# Patient Record
Sex: MALE | Race: BLACK OR AFRICAN AMERICAN | NOT HISPANIC OR LATINO | URBAN - METROPOLITAN AREA
[De-identification: names, ages, dates, MRNs, and addresses within clinical notes are randomized per-mention and may not be internally consistent; named-entity substitution may affect disease eponyms.]

---

## 2017-02-22 ENCOUNTER — EMERGENCY (EMERGENCY)
Facility: HOSPITAL | Age: 42
LOS: 1 days | Discharge: HOME | End: 2017-02-22
Attending: EMERGENCY MEDICINE | Admitting: EMERGENCY MEDICINE
Payer: COMMERCIAL

## 2017-02-22 VITALS
SYSTOLIC BLOOD PRESSURE: 171 MMHG | OXYGEN SATURATION: 99 % | HEART RATE: 73 BPM | RESPIRATION RATE: 18 BRPM | TEMPERATURE: 98 F | DIASTOLIC BLOOD PRESSURE: 97 MMHG

## 2017-02-22 LAB
ALBUMIN SERPL ELPH-MCNC: 4.2 G/DL — SIGNIFICANT CHANGE UP (ref 3.3–5)
ALP SERPL-CCNC: 138 U/L — HIGH (ref 40–120)
ALT FLD-CCNC: 44 U/L — HIGH (ref 4–41)
AST SERPL-CCNC: 48 U/L — HIGH (ref 4–40)
BASOPHILS # BLD AUTO: 0.01 K/UL — SIGNIFICANT CHANGE UP (ref 0–0.2)
BASOPHILS NFR BLD AUTO: 0.2 % — SIGNIFICANT CHANGE UP (ref 0–2)
BILIRUB SERPL-MCNC: 0.7 MG/DL — SIGNIFICANT CHANGE UP (ref 0.2–1.2)
BUN SERPL-MCNC: 10 MG/DL — SIGNIFICANT CHANGE UP (ref 7–23)
CALCIUM SERPL-MCNC: 9.2 MG/DL — SIGNIFICANT CHANGE UP (ref 8.4–10.5)
CHLORIDE SERPL-SCNC: 99 MMOL/L — SIGNIFICANT CHANGE UP (ref 98–107)
CO2 SERPL-SCNC: 27 MMOL/L — SIGNIFICANT CHANGE UP (ref 22–31)
CREAT SERPL-MCNC: 1.06 MG/DL — SIGNIFICANT CHANGE UP (ref 0.5–1.3)
EOSINOPHIL # BLD AUTO: 0.07 K/UL — SIGNIFICANT CHANGE UP (ref 0–0.5)
EOSINOPHIL NFR BLD AUTO: 1.2 % — SIGNIFICANT CHANGE UP (ref 0–6)
GLUCOSE SERPL-MCNC: 98 MG/DL — SIGNIFICANT CHANGE UP (ref 70–99)
HCT VFR BLD CALC: 43.8 % — SIGNIFICANT CHANGE UP (ref 39–50)
HGB BLD-MCNC: 13.5 G/DL — SIGNIFICANT CHANGE UP (ref 13–17)
IMM GRANULOCYTES NFR BLD AUTO: 0.2 % — SIGNIFICANT CHANGE UP (ref 0–1.5)
LIDOCAIN IGE QN: 42.7 U/L — SIGNIFICANT CHANGE UP (ref 7–60)
LYMPHOCYTES # BLD AUTO: 1.9 K/UL — SIGNIFICANT CHANGE UP (ref 1–3.3)
LYMPHOCYTES # BLD AUTO: 31.5 % — SIGNIFICANT CHANGE UP (ref 13–44)
MCHC RBC-ENTMCNC: 22.8 PG — LOW (ref 27–34)
MCHC RBC-ENTMCNC: 30.8 % — LOW (ref 32–36)
MCV RBC AUTO: 73.9 FL — LOW (ref 80–100)
MONOCYTES # BLD AUTO: 0.5 K/UL — SIGNIFICANT CHANGE UP (ref 0–0.9)
MONOCYTES NFR BLD AUTO: 8.3 % — SIGNIFICANT CHANGE UP (ref 2–14)
NEUTROPHILS # BLD AUTO: 3.55 K/UL — SIGNIFICANT CHANGE UP (ref 1.8–7.4)
NEUTROPHILS NFR BLD AUTO: 58.6 % — SIGNIFICANT CHANGE UP (ref 43–77)
PLATELET # BLD AUTO: 292 K/UL — SIGNIFICANT CHANGE UP (ref 150–400)
PMV BLD: 10.9 FL — SIGNIFICANT CHANGE UP (ref 7–13)
POTASSIUM SERPL-MCNC: 3.9 MMOL/L — SIGNIFICANT CHANGE UP (ref 3.5–5.3)
POTASSIUM SERPL-SCNC: 3.9 MMOL/L — SIGNIFICANT CHANGE UP (ref 3.5–5.3)
PROT SERPL-MCNC: 8 G/DL — SIGNIFICANT CHANGE UP (ref 6–8.3)
RBC # BLD: 5.93 M/UL — HIGH (ref 4.2–5.8)
RBC # FLD: 15.4 % — HIGH (ref 10.3–14.5)
SODIUM SERPL-SCNC: 140 MMOL/L — SIGNIFICANT CHANGE UP (ref 135–145)
TROPONIN T SERPL-MCNC: < 0.06 NG/ML — SIGNIFICANT CHANGE UP (ref 0–0.06)
WBC # BLD: 6.04 K/UL — SIGNIFICANT CHANGE UP (ref 3.8–10.5)
WBC # FLD AUTO: 6.04 K/UL — SIGNIFICANT CHANGE UP (ref 3.8–10.5)

## 2017-02-22 PROCEDURE — 93010 ELECTROCARDIOGRAM REPORT: CPT | Mod: 59

## 2017-02-22 PROCEDURE — 99284 EMERGENCY DEPT VISIT MOD MDM: CPT | Mod: 25

## 2017-02-22 PROCEDURE — 76775 US EXAM ABDO BACK WALL LIM: CPT | Mod: 26

## 2017-02-22 PROCEDURE — 76705 ECHO EXAM OF ABDOMEN: CPT | Mod: 26

## 2017-02-22 RX ORDER — METOCLOPRAMIDE HCL 10 MG
10 TABLET ORAL ONCE
Qty: 0 | Refills: 0 | Status: COMPLETED | OUTPATIENT
Start: 2017-02-22 | End: 2017-02-22

## 2017-02-22 RX ORDER — FAMOTIDINE 10 MG/ML
20 INJECTION INTRAVENOUS ONCE
Qty: 0 | Refills: 0 | Status: COMPLETED | OUTPATIENT
Start: 2017-02-22 | End: 2017-02-22

## 2017-02-22 RX ADMIN — Medication 30 MILLILITER(S): at 10:15

## 2017-02-22 RX ADMIN — FAMOTIDINE 20 MILLIGRAM(S): 10 INJECTION INTRAVENOUS at 10:15

## 2017-02-22 RX ADMIN — Medication 10 MILLIGRAM(S): at 10:15

## 2017-02-22 NOTE — ED PROVIDER NOTE - PHYSICAL EXAMINATION
Stacy att: General: Well appearing, nontoxic, no acute distress; Head: Normocephalic Atraumatic; Eyes: PERRL, EOMI; ENT: Airway patent; Neck: supple; Chest: Lungs clear to auscultation bilateral; Cardiac: Regular rate and rhythm, no murmurs, rubs or gallops; Abdomen: soft, epigastric tenderness, nondistended; no guarding or rebound, no pulsatile masses; Musculoskeletal: Calves symmetric, nontender, no palpable cord. Symmetric calves. No discoloration or tenderness. No pain with ROM of knee.; Skin: No rash, normal skin tone; Neck: no meningismus. Neuro: Alert and Oriented to person, place, and time; No focal deficit, CN 2-12 symmetric and intact. Motor strength 5/5 b/l. Stacy att: General: Well appearing, nontoxic, no acute distress; Head: Normocephalic Atraumatic; Eyes: PERRL, EOMI; ENT: Airway patent, TM clear bilateral, helix/tragus nontender, no mastoid tenderness; Neck: supple, no meningismus; Chest: Lungs clear to auscultation bilateral; Cardiac: Regular rate and rhythm, no murmurs, rubs or gallops; Abdomen: soft, epigastric tenderness, nondistended; no guarding or rebound, no pulsatile masses; Musculoskeletal: Calves symmetric, nontender, no palpable cord. Symmetric calves. No discoloration or tenderness. No pain with ROM of knee.; Skin: No rash, normal skin tone. Neuro: Alert and Oriented to person, place, and time; No focal deficit, CN 2-12 symmetric and intact. Motor strength 5/5 b/l.

## 2017-02-22 NOTE — ED PROVIDER NOTE - OBJECTIVE STATEMENT
42 y/o M pt with PMHx of HTN and seizure presents to the ED for gradual onset of worsening HA rated 6-7/10 in severity, associated dizziness, abd pain described as burning, and vomiting x4 days. Also endorses left knee pain x weeks. Took Motrin and Tylenol which provided no relief. Denies LOC. 42 y/o M pt with PMHx of HTN and seizure presents to the ED for gradual onset of worsening HA rated 6-7/10 in severity, associated dizziness, abd pain described as burning, and vomiting x4 days. Also endorses left knee pain x weeks, b/l ear pain and rhinorrhea. Took Motrin and Tylenol which provided no relief. Denies LOC. 40 y/o M pt with PMHx of HTN and seizure presents to the ED for gradual onset of worsening HA rated 6-7/10 in severity, gradual onset, no abrupt onset, onset at rest, nonexertional, non positional, associated lightheaded, no LOC for 4 days.  Reports preceding, abd pain described as burning, epigastric, nonradiating, as well as vomiting x5 days. Also endorses left knee pain x weeks, b/l ear pain and rhinorrhea. Took Motrin and Tylenol which seem to make epigastric pain worse.  States he has had worse headaches in the past, states the abdominal pain is higher in severity. Denies any CP, SOB, fever, focal neurologic weakness, neck pain or stiffness.  Triage notes right arm pain, patient denies this, states his left knee is the extremity bothering him.

## 2017-02-22 NOTE — ED PROVIDER NOTE - MEDICAL DECISION MAKING DETAILS
42 y/o M pt presenting with:  1. Headache. Patient is adamant that he has gradual onset of HA over days. No meningismus. Subarachnoid hemorrhage unlikely. No evidence of meningitis. Non focal neurological exam. No concern for intracranial mass.   2. Abdominal pain. Will obtain US and labs.   3. Left knee pain. No evidence of DVT, septic arthritis or soft tissue infection. 42 y/o M pt presenting with:  1. Headache. Patient is adamant that he has gradual onset of HA over days. No meningismus, nontoxic. Subarachnoid hemorrhage unlikely, intracranial bleed, meningitis, encephalitis, temporal arteritis, or intracranial mass.  2. Abdominal pain. Will obtain US and labs. Differential includes pancreatitis, gallstones, cholecystitis, AAA unlikely, atypical ACS unlikely give reproducibility, given EKG TWI that are not static, yet no other signs of ischemia, troponin sent, pain has been constant for days.  3. Left knee pain. No evidence of DVT, septic arthritis or soft tissue infection or e/o fracture. 42 y/o M pt presenting with:  1. Headache. Patient is adamant that he has gradual onset of HA over days. No meningismus, nontoxic. Subarachnoid hemorrhage unlikely, intracranial bleed, meningitis, encephalitis, temporal arteritis, or intracranial mass.  2. Abdominal pain. Will obtain US and labs. Differential includes pancreatitis, gallstones, cholecystitis, AAA unlikely, atypical ACS unlikely give reproducibility, given EKG TWI that are not static, yet no other signs of ischemia, troponin sent, negative.  Not c/w ACS.  No evidence of pancreatitis, AAA, cholecystitis, choledocholithiasis, cholangitis, mesenteric ischemia, small bowel obstruction, diverticulitis, colitis, appendicitis.    3. Left knee pain. No evidence of DVT, septic arthritis or soft tissue infection or e/o fracture.

## 2017-02-22 NOTE — ED PROVIDER NOTE - NS ED MD SCRIBE ATTENDING SCRIBE SECTIONS
PAST MEDICAL/SURGICAL/SOCIAL HISTORY/PHYSICAL EXAM/HIV/REVIEW OF SYSTEMS/VITAL SIGNS( Pullset)/HISTORY OF PRESENT ILLNESS/DISPOSITION PHYSICAL EXAM/DISPOSITION/HIV/VITAL SIGNS( Pullset)/REVIEW OF SYSTEMS/PAST MEDICAL/SURGICAL/SOCIAL HISTORY/HISTORY OF PRESENT ILLNESS/RESULTS

## 2017-02-22 NOTE — ED PROVIDER NOTE - CHPI ED SYMPTOMS POS
HEADACHE/VOMITING/DIZZINESS/abd pain, left knee pain DIZZINESS/abd pain, left knee pain, b/l ear pain, rhinorrhea/HEADACHE/VOMITING

## 2017-02-22 NOTE — ED PROVIDER NOTE - CARE PLAN
Principal Discharge DX:	Epigastric abdominal pain  Secondary Diagnosis:	Acute nonintractable headache, unspecified headache type  Secondary Diagnosis:	Acute pain of left knee

## 2017-02-22 NOTE — ED ADULT TRIAGE NOTE - CHIEF COMPLAINT QUOTE
pt coming with right side HA since with right arm pain/ with upper ABD pain rad. to back area  Sat. denies CP, /no dizziness  Meds. Phenytoin Sodium 100mg, Valsartan-HCTZ  320-25mg OD

## 2017-11-13 ENCOUNTER — EMERGENCY (EMERGENCY)
Facility: HOSPITAL | Age: 42
LOS: 1 days | Discharge: HOME | End: 2017-11-13
Attending: EMERGENCY MEDICINE | Admitting: EMERGENCY MEDICINE
Payer: COMMERCIAL

## 2017-11-13 VITALS
SYSTOLIC BLOOD PRESSURE: 159 MMHG | RESPIRATION RATE: 18 BRPM | DIASTOLIC BLOOD PRESSURE: 99 MMHG | HEART RATE: 66 BPM | OXYGEN SATURATION: 99 % | TEMPERATURE: 98 F

## 2017-11-13 PROCEDURE — 72074 X-RAY EXAM THORAC SPINE4/>VW: CPT | Mod: 26

## 2017-11-13 PROCEDURE — 99283 EMERGENCY DEPT VISIT LOW MDM: CPT

## 2017-11-13 RX ORDER — ACETAMINOPHEN 500 MG
650 TABLET ORAL ONCE
Qty: 0 | Refills: 0 | Status: COMPLETED | OUTPATIENT
Start: 2017-11-13 | End: 2017-11-13

## 2017-11-13 RX ORDER — IBUPROFEN 200 MG
1 TABLET ORAL
Qty: 20 | Refills: 0
Start: 2017-11-13 | End: 2017-11-18

## 2017-11-13 RX ADMIN — Medication 650 MILLIGRAM(S): at 15:51

## 2017-11-13 NOTE — ED PROVIDER NOTE - OBJECTIVE STATEMENT
Sent Naftin topical to his pharmacy. Thanks.    41 y/o M w/ PMHx of HTN and seizures, presents to the ED c/o intermittent b/l upper back pain x1 month. Pain is worse w/ lying down. Endorses use of Tylenol, Aleve, Advil, Vinayak and Motrin w/ no relief. Pt states he was not able to sleep last night due to pain, so decided to come to ER today. Denies numbness/tingling, weakness, urinary/fecal incontinence, fall, trauma or any other complaints. NKDA. 43 y/o M w/ PMHx of HTN and seizures, presents to the ED c/o intermittent b/l aching upper back pain x1 month. Pain is worse w/ lying down and on change of position. Endorses use of Tylenol, Aleve. Pt states he was not able to sleep last night due to pain, so decided to come to ER today. States he has a very physical job. Denies numbness/tingling, weakness, urinary/fecal incontinence, fall, trauma, fevers, cp, sob, abd pain, vomiting, diarrhea, dysuria, or any other complaints. NKDA.

## 2017-11-13 NOTE — ED PROVIDER NOTE - PLAN OF CARE
Follow with your PMD within 48-72 hours.  Rest, no heavy lifting.  Warm compresses to area. Recommend Ortho consult to discuss possible MRI vs Physical Therapy- referral list provided.  Light walking. Take Motrin 600 mg every 6-8 hours for pain with food, Any worsening pain, weakness, numbness, bowel or urinary incontinence or new concerning symptoms return to the Emergency Department.

## 2017-11-13 NOTE — ED PROVIDER NOTE - CARE PLAN
Principal Discharge DX:	Musculoskeletal pain  Instructions for follow-up, activity and diet:	Follow with your PMD within 48-72 hours.  Rest, no heavy lifting.  Warm compresses to area. Recommend Ortho consult to discuss possible MRI vs Physical Therapy- referral list provided.  Light walking. Take Motrin 600 mg every 6-8 hours for pain with food, Any worsening pain, weakness, numbness, bowel or urinary incontinence or new concerning symptoms return to the Emergency Department.

## 2017-11-13 NOTE — ED PROVIDER NOTE - CHPI ED SYMPTOMS NEG
no numbness/no bladder dysfunction/no tingling/no bowel dysfunction/no motor function loss no bowel dysfunction/no motor function loss/no tingling/no bladder dysfunction

## 2017-11-13 NOTE — ED PROVIDER NOTE - MUSCULOSKELETAL MINIMAL EXAM
no midline spinal tenderness, no deformity atraumatic/normal range of motion/neck supple/no midline spinal tenderness, no deformity

## 2017-11-13 NOTE — ED PROVIDER NOTE - MEDICAL DECISION MAKING DETAILS
41 y/o M w/ intermittent upper back pain, worse w/ movement. Normal exam. Non toxic appearing. Vitals stable. Pt concerned about upper spine malalignment. Will XR, pain control and PMD follow up.

## 2020-01-16 ENCOUNTER — EMERGENCY (EMERGENCY)
Facility: HOSPITAL | Age: 45
LOS: 1 days | Discharge: HOME | End: 2020-01-16
Attending: EMERGENCY MEDICINE | Admitting: EMERGENCY MEDICINE
Payer: COMMERCIAL

## 2020-01-16 VITALS
SYSTOLIC BLOOD PRESSURE: 162 MMHG | RESPIRATION RATE: 17 BRPM | HEART RATE: 86 BPM | DIASTOLIC BLOOD PRESSURE: 84 MMHG | OXYGEN SATURATION: 98 % | WEIGHT: 181 LBS | HEIGHT: 68 IN | TEMPERATURE: 98 F

## 2020-01-16 VITALS
HEART RATE: 82 BPM | TEMPERATURE: 98 F | SYSTOLIC BLOOD PRESSURE: 150 MMHG | RESPIRATION RATE: 15 BRPM | DIASTOLIC BLOOD PRESSURE: 80 MMHG | OXYGEN SATURATION: 98 %

## 2020-01-16 LAB
ALBUMIN SERPL ELPH-MCNC: 3.6 G/DL — SIGNIFICANT CHANGE UP (ref 3.3–5)
ALP SERPL-CCNC: 128 U/L — HIGH (ref 40–120)
ALT FLD-CCNC: 33 U/L — SIGNIFICANT CHANGE UP (ref 12–78)
ANION GAP SERPL CALC-SCNC: 8 MMOL/L — SIGNIFICANT CHANGE UP (ref 5–17)
AST SERPL-CCNC: 37 U/L — SIGNIFICANT CHANGE UP (ref 15–37)
BASOPHILS # BLD AUTO: 0.03 K/UL — SIGNIFICANT CHANGE UP (ref 0–0.2)
BASOPHILS NFR BLD AUTO: 0.5 % — SIGNIFICANT CHANGE UP (ref 0–2)
BILIRUB SERPL-MCNC: 0.2 MG/DL — SIGNIFICANT CHANGE UP (ref 0.2–1.2)
BUN SERPL-MCNC: 10 MG/DL — SIGNIFICANT CHANGE UP (ref 7–23)
CALCIUM SERPL-MCNC: 8.4 MG/DL — LOW (ref 8.5–10.1)
CHLORIDE SERPL-SCNC: 110 MMOL/L — HIGH (ref 96–108)
CO2 SERPL-SCNC: 23 MMOL/L — SIGNIFICANT CHANGE UP (ref 22–31)
CREAT SERPL-MCNC: 1.2 MG/DL — SIGNIFICANT CHANGE UP (ref 0.5–1.3)
EOSINOPHIL # BLD AUTO: 0.07 K/UL — SIGNIFICANT CHANGE UP (ref 0–0.5)
EOSINOPHIL NFR BLD AUTO: 1.2 % — SIGNIFICANT CHANGE UP (ref 0–6)
GLUCOSE SERPL-MCNC: 95 MG/DL — SIGNIFICANT CHANGE UP (ref 70–99)
HCT VFR BLD CALC: 41.6 % — SIGNIFICANT CHANGE UP (ref 39–50)
HGB BLD-MCNC: 12.6 G/DL — LOW (ref 13–17)
IMM GRANULOCYTES NFR BLD AUTO: 0.2 % — SIGNIFICANT CHANGE UP (ref 0–1.5)
LYMPHOCYTES # BLD AUTO: 0.85 K/UL — LOW (ref 1–3.3)
LYMPHOCYTES # BLD AUTO: 15.2 % — SIGNIFICANT CHANGE UP (ref 13–44)
MCHC RBC-ENTMCNC: 22.7 PG — LOW (ref 27–34)
MCHC RBC-ENTMCNC: 30.3 GM/DL — LOW (ref 32–36)
MCV RBC AUTO: 75 FL — LOW (ref 80–100)
MONOCYTES # BLD AUTO: 0.48 K/UL — SIGNIFICANT CHANGE UP (ref 0–0.9)
MONOCYTES NFR BLD AUTO: 8.6 % — SIGNIFICANT CHANGE UP (ref 2–14)
NEUTROPHILS # BLD AUTO: 4.17 K/UL — SIGNIFICANT CHANGE UP (ref 1.8–7.4)
NEUTROPHILS NFR BLD AUTO: 74.3 % — SIGNIFICANT CHANGE UP (ref 43–77)
NRBC # BLD: 0 /100 WBCS — SIGNIFICANT CHANGE UP (ref 0–0)
PHENYTOIN FREE SERPL-MCNC: 5.2 UG/ML — LOW (ref 10–20)
PLATELET # BLD AUTO: 255 K/UL — SIGNIFICANT CHANGE UP (ref 150–400)
POTASSIUM SERPL-MCNC: 4.4 MMOL/L — SIGNIFICANT CHANGE UP (ref 3.5–5.3)
POTASSIUM SERPL-SCNC: 4.4 MMOL/L — SIGNIFICANT CHANGE UP (ref 3.5–5.3)
PROT SERPL-MCNC: 7.5 G/DL — SIGNIFICANT CHANGE UP (ref 6–8.3)
RBC # BLD: 5.55 M/UL — SIGNIFICANT CHANGE UP (ref 4.2–5.8)
RBC # FLD: 15 % — HIGH (ref 10.3–14.5)
SODIUM SERPL-SCNC: 141 MMOL/L — SIGNIFICANT CHANGE UP (ref 135–145)
WBC # BLD: 5.61 K/UL — SIGNIFICANT CHANGE UP (ref 3.8–10.5)
WBC # FLD AUTO: 5.61 K/UL — SIGNIFICANT CHANGE UP (ref 3.8–10.5)

## 2020-01-16 PROCEDURE — 85027 COMPLETE CBC AUTOMATED: CPT

## 2020-01-16 PROCEDURE — 80053 COMPREHEN METABOLIC PANEL: CPT

## 2020-01-16 PROCEDURE — 96374 THER/PROPH/DIAG INJ IV PUSH: CPT

## 2020-01-16 PROCEDURE — 96361 HYDRATE IV INFUSION ADD-ON: CPT

## 2020-01-16 PROCEDURE — 36415 COLL VENOUS BLD VENIPUNCTURE: CPT

## 2020-01-16 PROCEDURE — 80185 ASSAY OF PHENYTOIN TOTAL: CPT

## 2020-01-16 PROCEDURE — 99284 EMERGENCY DEPT VISIT MOD MDM: CPT

## 2020-01-16 PROCEDURE — 99284 EMERGENCY DEPT VISIT MOD MDM: CPT | Mod: 25

## 2020-01-16 PROCEDURE — 93005 ELECTROCARDIOGRAM TRACING: CPT

## 2020-01-16 PROCEDURE — 93010 ELECTROCARDIOGRAM REPORT: CPT

## 2020-01-16 RX ORDER — ACETAMINOPHEN 500 MG
650 TABLET ORAL ONCE
Refills: 0 | Status: COMPLETED | OUTPATIENT
Start: 2020-01-16 | End: 2020-01-16

## 2020-01-16 RX ORDER — SODIUM CHLORIDE 9 MG/ML
1000 INJECTION INTRAMUSCULAR; INTRAVENOUS; SUBCUTANEOUS ONCE
Refills: 0 | Status: COMPLETED | OUTPATIENT
Start: 2020-01-16 | End: 2020-01-16

## 2020-01-16 RX ORDER — LEVETIRACETAM 250 MG/1
1000 TABLET, FILM COATED ORAL ONCE
Refills: 0 | Status: COMPLETED | OUTPATIENT
Start: 2020-01-16 | End: 2020-01-16

## 2020-01-16 RX ADMIN — Medication 650 MILLIGRAM(S): at 09:54

## 2020-01-16 RX ADMIN — SODIUM CHLORIDE 1000 MILLILITER(S): 9 INJECTION INTRAMUSCULAR; INTRAVENOUS; SUBCUTANEOUS at 09:54

## 2020-01-16 RX ADMIN — Medication 650 MILLIGRAM(S): at 11:20

## 2020-01-16 RX ADMIN — LEVETIRACETAM 440 MILLIGRAM(S): 250 TABLET, FILM COATED ORAL at 11:19

## 2020-01-16 RX ADMIN — SODIUM CHLORIDE 1000 MILLILITER(S): 9 INJECTION INTRAMUSCULAR; INTRAVENOUS; SUBCUTANEOUS at 11:20

## 2020-01-16 NOTE — ED PROVIDER NOTE - CARE PROVIDER_API CALL
Zeferino Lombardo)  Neurology  [address] [place] [address]  Phone: [phone]  Fax: [phone]  Follow Up Time:

## 2020-01-16 NOTE — ED PROVIDER NOTE - OBJECTIVE STATEMENT
pt with h/o seizures and HTN, pt usually knows when a seizure is coming, he felt he was going to have a seizure this morning so took an extra dose of dilantin.  when went to work he had a seizure witnessed, no significant trauma, pt currently in post-ictal phase, reports mild lip biting, no bowel or bladder incontinence.  c/o mild front headache.

## 2020-01-16 NOTE — ED ADULT NURSE NOTE - CHPI ED NUR SYMPTOMS NEG
no fever/no blurred vision/no weakness/no nausea/no numbness/no change in level of consciousness/no confusion/no vomiting/no dizziness

## 2020-01-16 NOTE — ED PROVIDER NOTE - NSFOLLOWUPINSTRUCTIONS_ED_ALL_ED_FT
-- Follow up with neurology (Dr. Garrett) in the  next 1 week.    -- Increase your Dilantin to 4 times a day, same day.    -- Return to the ER for worsening or persistent symptoms, and/or ANY NEW OR CONCERNING SYMPTOMS.    -- If you have difficulty following up, please call: [phone] to obtain a [facility] doctor or specialist who takes your insurance in your area.

## 2020-01-16 NOTE — ED PROVIDER NOTE - CLINICAL SUMMARY MEDICAL DECISION MAKING FREE TEXT BOX
pt with h/o seizures, had seizure today pt with h/o seizures, had seizure today, dilantin level low likely because pt is only taking dilantin bid, advised that he should be taking it 4 times a day

## 2020-01-16 NOTE — ED PROVIDER NOTE - PATIENT PORTAL LINK FT
You can access the FollowMyHealth Patient Portal offered by [facility] by registering at the following website: [email or web address]. By joining [employer]’s FollowMyHealth portal, you will also be able to view your health information using other applications (apps) compatible with our system.

## 2020-02-13 NOTE — ED PROVIDER NOTE - PROGRESS NOTE DETAILS
The patient is a 2y8m Male complaining of fever.
PA Tiberio- xray thoracic spine negative. Pt to follow up with Ortho outpt- referral list provided.

## 2020-02-27 ENCOUNTER — EMERGENCY (EMERGENCY)
Facility: HOSPITAL | Age: 45
LOS: 0 days | Discharge: HOME | End: 2020-02-27
Payer: COMMERCIAL

## 2020-02-27 VITALS
DIASTOLIC BLOOD PRESSURE: 97 MMHG | TEMPERATURE: 98 F | WEIGHT: 184.97 LBS | HEIGHT: 68 IN | SYSTOLIC BLOOD PRESSURE: 153 MMHG | HEART RATE: 63 BPM | OXYGEN SATURATION: 100 % | RESPIRATION RATE: 15 BRPM

## 2020-02-27 DIAGNOSIS — G89.29 OTHER CHRONIC PAIN: ICD-10-CM

## 2020-02-27 DIAGNOSIS — H92.03 OTALGIA, BILATERAL: ICD-10-CM

## 2020-02-27 DIAGNOSIS — I10 ESSENTIAL (PRIMARY) HYPERTENSION: ICD-10-CM

## 2020-02-27 DIAGNOSIS — G40.909 EPILEPSY, UNSPECIFIED, NOT INTRACTABLE, WITHOUT STATUS EPILEPTICUS: ICD-10-CM

## 2020-02-27 PROCEDURE — 99283 EMERGENCY DEPT VISIT LOW MDM: CPT

## 2020-02-27 RX ORDER — IBUPROFEN 200 MG
1 TABLET ORAL
Qty: 28 | Refills: 0
Start: 2020-02-27 | End: 2020-03-04

## 2020-02-27 NOTE — ED PROVIDER NOTE - PATIENT PORTAL LINK FT
You can access the FollowMyHealth Patient Portal offered by [facility] by registering at the following website: [email or web address]. By joining [employer]’s FollowMyHealth portal, you will also be able to view your health information using other applications (apps) compatible with our system.

## 2020-02-27 NOTE — ED ADULT TRIAGE NOTE - CHIEF COMPLAINT QUOTE
ears infections feels swollen inside. feels like something moving left ear. Pain to his legs and itching to his legs

## 2020-02-27 NOTE — ED PROVIDER NOTE - CLINICAL SUMMARY MEDICAL DECISION MAKING FREE TEXT BOX
otc pain and see ent  in 1 week  Discussed results and outcome of testing with the patient.  Patient advised to please follow up with their primary care doctor within the next 24-48 hours and return to the Emergency Department for worsening symptoms or any other concerns.  Patient advised that their doctor may call  to follow up on the specific results of the tests performed today in the emergency department.

## 2020-02-27 NOTE — ED ADULT NURSE NOTE - OBJECTIVE STATEMENT
Pt c/o bilateral ear pain for months worsened last night with swelling to right side of jaw. Pt c/o pain to neck denies any injury

## 2020-08-03 ENCOUNTER — EMERGENCY (EMERGENCY)
Facility: HOSPITAL | Age: 45
LOS: 1 days | Discharge: HOME | End: 2020-08-03
Attending: EMERGENCY MEDICINE | Admitting: EMERGENCY MEDICINE
Payer: COMMERCIAL

## 2020-08-03 VITALS
SYSTOLIC BLOOD PRESSURE: 155 MMHG | TEMPERATURE: 99 F | HEART RATE: 76 BPM | RESPIRATION RATE: 14 BRPM | DIASTOLIC BLOOD PRESSURE: 87 MMHG | OXYGEN SATURATION: 100 %

## 2020-08-03 LAB
ALBUMIN SERPL ELPH-MCNC: 4.1 G/DL — SIGNIFICANT CHANGE UP (ref 3.3–5)
ALP SERPL-CCNC: 141 U/L — HIGH (ref 40–120)
ALT FLD-CCNC: 24 U/L — SIGNIFICANT CHANGE UP (ref 4–41)
ANION GAP SERPL CALC-SCNC: 10 MMO/L — SIGNIFICANT CHANGE UP (ref 7–14)
AST SERPL-CCNC: 29 U/L — SIGNIFICANT CHANGE UP (ref 4–40)
BASOPHILS # BLD AUTO: 0.03 K/UL — SIGNIFICANT CHANGE UP (ref 0–0.2)
BASOPHILS NFR BLD AUTO: 0.5 % — SIGNIFICANT CHANGE UP (ref 0–2)
BILIRUB SERPL-MCNC: < 0.2 MG/DL — LOW (ref 0.2–1.2)
BUN SERPL-MCNC: 9 MG/DL — SIGNIFICANT CHANGE UP (ref 7–23)
CALCIUM SERPL-MCNC: 9.1 MG/DL — SIGNIFICANT CHANGE UP (ref 8.4–10.5)
CHLORIDE SERPL-SCNC: 102 MMOL/L — SIGNIFICANT CHANGE UP (ref 98–107)
CO2 SERPL-SCNC: 27 MMOL/L — SIGNIFICANT CHANGE UP (ref 22–31)
CREAT SERPL-MCNC: 1.08 MG/DL — SIGNIFICANT CHANGE UP (ref 0.5–1.3)
EOSINOPHIL # BLD AUTO: 0.24 K/UL — SIGNIFICANT CHANGE UP (ref 0–0.5)
EOSINOPHIL NFR BLD AUTO: 4.3 % — SIGNIFICANT CHANGE UP (ref 0–6)
GLUCOSE SERPL-MCNC: 86 MG/DL — SIGNIFICANT CHANGE UP (ref 70–99)
HCT VFR BLD CALC: 38.8 % — LOW (ref 39–50)
HGB BLD-MCNC: 11.8 G/DL — LOW (ref 13–17)
IMM GRANULOCYTES NFR BLD AUTO: 0.2 % — SIGNIFICANT CHANGE UP (ref 0–1.5)
LYMPHOCYTES # BLD AUTO: 1.91 K/UL — SIGNIFICANT CHANGE UP (ref 1–3.3)
LYMPHOCYTES # BLD AUTO: 34.1 % — SIGNIFICANT CHANGE UP (ref 13–44)
MCHC RBC-ENTMCNC: 22.6 PG — LOW (ref 27–34)
MCHC RBC-ENTMCNC: 30.4 % — LOW (ref 32–36)
MCV RBC AUTO: 74.5 FL — LOW (ref 80–100)
MONOCYTES # BLD AUTO: 0.54 K/UL — SIGNIFICANT CHANGE UP (ref 0–0.9)
MONOCYTES NFR BLD AUTO: 9.6 % — SIGNIFICANT CHANGE UP (ref 2–14)
NEUTROPHILS # BLD AUTO: 2.87 K/UL — SIGNIFICANT CHANGE UP (ref 1.8–7.4)
NEUTROPHILS NFR BLD AUTO: 51.3 % — SIGNIFICANT CHANGE UP (ref 43–77)
NRBC # FLD: 0 K/UL — SIGNIFICANT CHANGE UP (ref 0–0)
PHENYTOIN FREE SERPL-MCNC: 25.2 UG/ML — HIGH (ref 10–20)
PLATELET # BLD AUTO: 288 K/UL — SIGNIFICANT CHANGE UP (ref 150–400)
PMV BLD: 10.7 FL — SIGNIFICANT CHANGE UP (ref 7–13)
POTASSIUM SERPL-MCNC: 4 MMOL/L — SIGNIFICANT CHANGE UP (ref 3.5–5.3)
POTASSIUM SERPL-SCNC: 4 MMOL/L — SIGNIFICANT CHANGE UP (ref 3.5–5.3)
PROT SERPL-MCNC: 7.4 G/DL — SIGNIFICANT CHANGE UP (ref 6–8.3)
RBC # BLD: 5.21 M/UL — SIGNIFICANT CHANGE UP (ref 4.2–5.8)
RBC # FLD: 14.9 % — HIGH (ref 10.3–14.5)
SODIUM SERPL-SCNC: 139 MMOL/L — SIGNIFICANT CHANGE UP (ref 135–145)
TROPONIN T, HIGH SENSITIVITY: < 6 NG/L — SIGNIFICANT CHANGE UP (ref ?–14)
WBC # BLD: 5.6 K/UL — SIGNIFICANT CHANGE UP (ref 3.8–10.5)
WBC # FLD AUTO: 5.6 K/UL — SIGNIFICANT CHANGE UP (ref 3.8–10.5)

## 2020-08-03 PROCEDURE — 99284 EMERGENCY DEPT VISIT MOD MDM: CPT

## 2020-08-03 PROCEDURE — 71046 X-RAY EXAM CHEST 2 VIEWS: CPT | Mod: 26

## 2020-08-03 RX ORDER — ACETAMINOPHEN 500 MG
975 TABLET ORAL ONCE
Refills: 0 | Status: COMPLETED | OUTPATIENT
Start: 2020-08-03 | End: 2020-08-03

## 2020-08-03 RX ADMIN — Medication 975 MILLIGRAM(S): at 10:31

## 2020-08-03 NOTE — ED PROVIDER NOTE - CLINICAL SUMMARY MEDICAL DECISION MAKING FREE TEXT BOX
46 yo M pmh htn, seizure disorder on dilantin presents with tooth pain and episode of dizziness.  VSS AF.  Exam with tooth decay lower premolar, but no e/o dental abscess.  Also with soft tissue mass on upper left gumline, non-hemorrhagic.  Unclear etiology of lightheadedness, presentation best characterized as pre syncope.  Not c/w seizure.  Patient with low-intensity cp.  Low concern for acute coronary syndrome as chest pain non-exertional, non-radiating, and there is no nausea or diaphoresis.  Low concern for aortic dissection as chest pain not radiating to back, not described as "tearing", no pulse or neuro deficit on exam.  Low concern for pulmonary embolism, patient is PERC negative with low pre-test probability of PE.  Will send labs, cxr, ekg, supportive care.  Dispo pending.

## 2020-08-03 NOTE — ED ADULT NURSE NOTE - OBJECTIVE STATEMENT
Pt A/Ox4 states while on his way to work he started to feel feverish and have a h/a, denies CP or SOB, states "I just didn't feel like myself." Pt appears well, afebrile at this time. Pt states "I feel like im burning behind my eyed." +h/a at present. PIV inserted with aseptic technique, blood drawn, labeled at bedside with 2 pt identifiers and sent to lab. Pt aware of POC, NAD.

## 2020-08-03 NOTE — ED ADULT TRIAGE NOTE - CHIEF COMPLAINT QUOTE
Pt c/o tooth pain x 2 years with a fever of "90"   afebrile in triage well appearing   PMH: HTN, seizures

## 2020-08-03 NOTE — ED PROVIDER NOTE - PATIENT PORTAL LINK FT
You can access the FollowMyHealth Patient Portal offered by [facility] by registering at the following website: [email or web address]. By joining [email or web address] [email or web address] portal, you will also be able to view your health information using other applications (apps) compatible with our system.

## 2020-08-03 NOTE — ED PROVIDER NOTE - OBJECTIVE STATEMENT
46 yo M pmh htn, seizure disorder on dilantin presents with tooth pain and episode of dizziness.  Patient states that he has had pain in lower left tooth for 2 years, feels it most days, associated with bleeding on pillow in morning some days.  No fevers, chills, trismus.  He is tolerating solids and liquids.  Last saw dentist >2 years ago and was told at that time that he needed to go to oral surgeon but was unable to get medical clearance by his pcp.  Denies tobacco product history.      When questioned why patient presented today while experiencing tooth pain for two years, he states that he was driving to work around 7 am and experienced episode of lightheadedness.  There was no syncope.  Patient reports substernal cp, non-exertional, non-radiating, no-pleuritic, slight pressure, no aggravating or alleviating factors.  Denies sob, palpitation, leg swelling.  No dvt/pe history.  Last seizure 12/2019.

## 2020-08-03 NOTE — ED PROVIDER NOTE - NSFOLLOWUPINSTRUCTIONS_ED_ALL_ED_FT
1.  Please return to care for worsening pain, fever, chills, inability to eat or drink, voice changes, chest pain, nausea, vomiting, dizziness, passing out.   2.  Take tylenol 500 mg ever 6-8 hours as needed for pain.   3.  Follow with your pcp and dentist as early as able.

## 2020-08-03 NOTE — ED PROVIDER NOTE - PHYSICAL EXAMINATION
GEN:  Non-toxic appearing, non-distressed, speaking full sentences, non-diaphoretic, AAOx3  HEENT:  NCAT, neck supple, EOMI, PERRLA, sclera anicteric, no conjunctival pallor or injection, no stridor, normal voice, no tonsillar exudate, uvula midline, tympanic membranes and external auditory canals normal appearing bilaterally   CV:  regular rhythm and rate, s1/s2 audible, no murmurs, rubs or gallops, peripheral pulses 2+ and symmetric  PULM:  non-labored respirations, lungs clear to auscultation bilaterally, no wheezes, crackles or rales  ABD:  non distended, non-tender, no rebound, no guarding, negative Rob's sign, bowel sounds normal, no cvat  MSK:  no gross deformity, non-tender extremities and joints, range of motion grossly normal appearing, no extremity edema, extremities warm and well perfused   NEURO:  AAOx3, CN II-XII intact, motor 5/5 in upper and lower extremities bilaterally, sensation grossly intact in extremities and trunk, finger to nose testing wnl, no nystagmus, negative Romberg, no pronator drift, no gait deficit  SKIN:  warm, dry, no rash or vesicles GEN:  Non-toxic appearing, non-distressed, speaking full sentences, non-diaphoretic, AAOx3  HEENT:  Poor dentition.  #19 tooth with severe decay with erosion to gumline; nontender, no fluctuant gingiva.  0.3 cm soft, mobile mass protruding from gingiva between #14 and #15, no bleeding.  Non-tender maxillary or submandibular areas. Neck supple, EOMI, PERRLA, sclera anicteric, no conjunctival pallor or injection, no stridor, normal voice, no tonsillar exudate, uvula midline, tympanic membranes and external auditory canals normal appearing bilaterally   CV:  regular rhythm and rate, s1/s2 audible, no murmurs, rubs or gallops, peripheral pulses 2+ and symmetric  PULM:  non-labored respirations, lungs clear to auscultation bilaterally, no wheezes, crackles or rales  ABD:  non distended, non-tender, no rebound, no guarding, negative Rob's sign, bowel sounds normal, no cvat  MSK:  no gross deformity, non-tender extremities and joints, range of motion grossly normal appearing, no extremity edema, extremities warm and well perfused   NEURO:  AAOx3, CN II-XII intact, motor 5/5 in upper and lower extremities bilaterally, sensation grossly intact in extremities and trunk, finger to nose testing wnl, no nystagmus, negative Romberg, no pronator drift, no gait deficit  SKIN:  warm, dry, no rash or vesicles

## 2020-08-03 NOTE — ED PROVIDER NOTE - PROGRESS NOTE DETAILS
PARISI:  Labs reassuring, trop neg, ekg non-ischemic.  Patient instructed to see dentist for further care of decayed tooth.

## 2020-08-03 NOTE — ED ADULT NURSE NOTE - NSIMPLEMENTINTERV_GEN_ALL_ED
Implemented All Universal Safety Interventions:  [place] to call system. Call bell, personal items and telephone within reach. Instruct patient to call for assistance. Room bathroom lighting operational. Non-slip footwear when patient is off stretcher. Physically safe environment: no spills, clutter or unnecessary equipment. Stretcher in lowest position, wheels locked, appropriate side rails in place.

## 2021-03-29 ENCOUNTER — EMERGENCY (EMERGENCY)
Facility: HOSPITAL | Age: 46
LOS: 1 days | Discharge: HOME | End: 2021-03-29
Attending: EMERGENCY MEDICINE | Admitting: EMERGENCY MEDICINE
Payer: COMMERCIAL

## 2021-03-29 VITALS
RESPIRATION RATE: 16 BRPM | SYSTOLIC BLOOD PRESSURE: 185 MMHG | DIASTOLIC BLOOD PRESSURE: 85 MMHG | TEMPERATURE: 98 F | HEIGHT: 68 IN | HEART RATE: 66 BPM | OXYGEN SATURATION: 100 %

## 2021-03-29 VITALS
SYSTOLIC BLOOD PRESSURE: 159 MMHG | RESPIRATION RATE: 16 BRPM | HEART RATE: 61 BPM | TEMPERATURE: 97 F | OXYGEN SATURATION: 100 % | DIASTOLIC BLOOD PRESSURE: 98 MMHG

## 2021-03-29 LAB
ALBUMIN SERPL ELPH-MCNC: 4.6 G/DL — SIGNIFICANT CHANGE UP (ref 3.3–5)
ALP SERPL-CCNC: 145 U/L — HIGH (ref 40–120)
ALT FLD-CCNC: 26 U/L — SIGNIFICANT CHANGE UP (ref 4–41)
ANION GAP SERPL CALC-SCNC: 11 MMOL/L — SIGNIFICANT CHANGE UP (ref 7–14)
APPEARANCE UR: CLEAR — SIGNIFICANT CHANGE UP
AST SERPL-CCNC: 33 U/L — SIGNIFICANT CHANGE UP (ref 4–40)
BASOPHILS # BLD AUTO: 0.03 K/UL — SIGNIFICANT CHANGE UP (ref 0–0.2)
BASOPHILS NFR BLD AUTO: 0.5 % — SIGNIFICANT CHANGE UP (ref 0–2)
BILIRUB SERPL-MCNC: <0.2 MG/DL — SIGNIFICANT CHANGE UP (ref 0.2–1.2)
BILIRUB UR-MCNC: NEGATIVE — SIGNIFICANT CHANGE UP
BUN SERPL-MCNC: 10 MG/DL — SIGNIFICANT CHANGE UP (ref 7–23)
CALCIUM SERPL-MCNC: 9.2 MG/DL — SIGNIFICANT CHANGE UP (ref 8.4–10.5)
CHLORIDE SERPL-SCNC: 101 MMOL/L — SIGNIFICANT CHANGE UP (ref 98–107)
CO2 SERPL-SCNC: 26 MMOL/L — SIGNIFICANT CHANGE UP (ref 22–31)
COLOR SPEC: COLORLESS — SIGNIFICANT CHANGE UP
CREAT SERPL-MCNC: 1.12 MG/DL — SIGNIFICANT CHANGE UP (ref 0.5–1.3)
DIFF PNL FLD: NEGATIVE — SIGNIFICANT CHANGE UP
EOSINOPHIL # BLD AUTO: 0.3 K/UL — SIGNIFICANT CHANGE UP (ref 0–0.5)
EOSINOPHIL NFR BLD AUTO: 4.6 % — SIGNIFICANT CHANGE UP (ref 0–6)
GLUCOSE SERPL-MCNC: 80 MG/DL — SIGNIFICANT CHANGE UP (ref 70–99)
GLUCOSE UR QL: NEGATIVE — SIGNIFICANT CHANGE UP
HCT VFR BLD CALC: 44.1 % — SIGNIFICANT CHANGE UP (ref 39–50)
HGB BLD-MCNC: 13.4 G/DL — SIGNIFICANT CHANGE UP (ref 13–17)
IANC: 2.96 K/UL — SIGNIFICANT CHANGE UP (ref 1.5–8.5)
IMM GRANULOCYTES NFR BLD AUTO: 0.2 % — SIGNIFICANT CHANGE UP (ref 0–1.5)
KETONES UR-MCNC: NEGATIVE — SIGNIFICANT CHANGE UP
LEUKOCYTE ESTERASE UR-ACNC: NEGATIVE — SIGNIFICANT CHANGE UP
LYMPHOCYTES # BLD AUTO: 2.59 K/UL — SIGNIFICANT CHANGE UP (ref 1–3.3)
LYMPHOCYTES # BLD AUTO: 39.8 % — SIGNIFICANT CHANGE UP (ref 13–44)
MCHC RBC-ENTMCNC: 22.9 PG — LOW (ref 27–34)
MCHC RBC-ENTMCNC: 30.4 GM/DL — LOW (ref 32–36)
MCV RBC AUTO: 75.3 FL — LOW (ref 80–100)
MONOCYTES # BLD AUTO: 0.61 K/UL — SIGNIFICANT CHANGE UP (ref 0–0.9)
MONOCYTES NFR BLD AUTO: 9.4 % — SIGNIFICANT CHANGE UP (ref 2–14)
NEUTROPHILS # BLD AUTO: 2.96 K/UL — SIGNIFICANT CHANGE UP (ref 1.8–7.4)
NEUTROPHILS NFR BLD AUTO: 45.5 % — SIGNIFICANT CHANGE UP (ref 43–77)
NITRITE UR-MCNC: NEGATIVE — SIGNIFICANT CHANGE UP
NRBC # BLD: 0 /100 WBCS — SIGNIFICANT CHANGE UP
NRBC # FLD: 0 K/UL — SIGNIFICANT CHANGE UP
PH UR: 7.5 — SIGNIFICANT CHANGE UP (ref 5–8)
PHENYTOIN FREE SERPL-MCNC: 23.5 UG/ML — HIGH (ref 10–20)
PLATELET # BLD AUTO: 222 K/UL — SIGNIFICANT CHANGE UP (ref 150–400)
POTASSIUM SERPL-MCNC: 4.5 MMOL/L — SIGNIFICANT CHANGE UP (ref 3.5–5.3)
POTASSIUM SERPL-SCNC: 4.5 MMOL/L — SIGNIFICANT CHANGE UP (ref 3.5–5.3)
PROT SERPL-MCNC: 7.9 G/DL — SIGNIFICANT CHANGE UP (ref 6–8.3)
PROT UR-MCNC: NEGATIVE — SIGNIFICANT CHANGE UP
RBC # BLD: 5.86 M/UL — HIGH (ref 4.2–5.8)
RBC # FLD: 15.6 % — HIGH (ref 10.3–14.5)
SODIUM SERPL-SCNC: 138 MMOL/L — SIGNIFICANT CHANGE UP (ref 135–145)
SP GR SPEC: 1.01 — LOW (ref 1.01–1.02)
TOXICOLOGY SCREEN, DRUGS OF ABUSE, SERUM RESULT: SIGNIFICANT CHANGE UP
TSH SERPL-MCNC: 1.07 UIU/ML — SIGNIFICANT CHANGE UP (ref 0.27–4.2)
UROBILINOGEN FLD QL: SIGNIFICANT CHANGE UP
WBC # BLD: 6.5 K/UL — SIGNIFICANT CHANGE UP (ref 3.8–10.5)
WBC # FLD AUTO: 6.5 K/UL — SIGNIFICANT CHANGE UP (ref 3.8–10.5)

## 2021-03-29 PROCEDURE — 70496 CT ANGIOGRAPHY HEAD: CPT | Mod: 26

## 2021-03-29 PROCEDURE — 70498 CT ANGIOGRAPHY NECK: CPT | Mod: 26

## 2021-03-29 PROCEDURE — 99285 EMERGENCY DEPT VISIT HI MDM: CPT

## 2021-03-29 RX ORDER — ACETAMINOPHEN 500 MG
650 TABLET ORAL ONCE
Refills: 0 | Status: COMPLETED | OUTPATIENT
Start: 2021-03-29 | End: 2021-03-29

## 2021-03-29 RX ORDER — METOCLOPRAMIDE HCL 10 MG
10 TABLET ORAL ONCE
Refills: 0 | Status: COMPLETED | OUTPATIENT
Start: 2021-03-29 | End: 2021-03-29

## 2021-03-29 RX ORDER — MECLIZINE HCL 12.5 MG
25 TABLET ORAL ONCE
Refills: 0 | Status: COMPLETED | OUTPATIENT
Start: 2021-03-29 | End: 2021-03-29

## 2021-03-29 RX ADMIN — Medication 25 MILLIGRAM(S): at 19:58

## 2021-03-29 RX ADMIN — Medication 650 MILLIGRAM(S): at 19:58

## 2021-03-29 RX ADMIN — Medication 10 MILLIGRAM(S): at 19:58

## 2021-03-29 NOTE — ED PROVIDER NOTE - PROGRESS NOTE DETAILS
Feeling much better, ambulating well.  Advised that he should discuss dilantin with his doc as sx may be related to AE.  Also reviwed all labs incl elevated dilantin tonight.  Per neuro c/s no need to hold dose tonight as is minimally elevated.

## 2021-03-29 NOTE — CONSULT NOTE ADULT - ASSESSMENT
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   On exam, he had very tense neck paraspinal musculature with tenderness to palpation, but no focal neurologic deficits except a + romberg and initially a wide based gait which improved after osteopathic manipulative treatment with suboccipital release to the neck.  No papilledema.      IMPRESSION: Cervicogenic HA a/w cervicogenic vertigo due to neck tension.     Plan  [] Performed suboccipital release to the neck with improvement of symptoms.   [] Warm compress to the neck,   [] Check phenytoin level.  PT's prior levels were either supra or subtherapeutic.  Would have PT follow up with his neurologist and decide if any alternate drug given he frequently gets dizzy.   [] Outpatient PT referral for continued work on neck musculature.   [] If absolutely refractory, can discuss with his neurologist low dose tizanidine muscle relaxant PRN for neck tension.   [] No further neurologic work up inpatient.   [] F/U results of CTH and CTA ordered  [] Can follow up with his neurologist or at [address] [place] [address] clinic.     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   On exam, he had very tense neck paraspinal musculature with tenderness to palpation, but no focal neurologic deficits except a + romberg and initially a wide based gait which improved after osteopathic manipulative treatment with suboccipital release to the neck.  No papilledema.      IMPRESSION: Cervicogenic HA a/w cervicogenic vertigo due to neck tension.     Plan  [] Performed suboccipital release to the neck with improvement of symptoms.   [] Warm compress to the neck,   [] Check phenytoin level.  PT's prior levels were either supra or subtherapeutic.  Would have PT follow up with his neurologist and decide if any alternate drug given he frequently gets dizzy.   [] Outpatient PT referral for continued work on neck musculature.   [] If absolutely refractory, can discuss with his neurologist low dose tizanidine muscle relaxant PRN for neck tension.   [] No further neurologic work up inpatient.   [] F/U results of CTH and CTA ordered  [] Can follow up with his neurologist or at [address] [place] [address] clinic.     Sammy Chaney, DO  PGY-3 Neurology Service

## 2021-03-29 NOTE — ED PROVIDER NOTE - NSFOLLOWUPINSTRUCTIONS_ED_ALL_ED_FT
Your dizziness might be a side effect of your dilantin.  Please take with your neurologist about this as soon as possible since this may not be the best medication for you.  You may need to switch to another seizure medication.     Return to the ER for worsening symptoms, fevers, or other concerning signs.     Please see your primary doctor and/or your neurologist early this week.

## 2021-03-29 NOTE — ED PROVIDER NOTE - PATIENT PORTAL LINK FT
You can access the FollowMyHealth Patient Portal offered by [facility] by registering at the following website: [email or web address]. By joining [employer]’s FollowMyHealth portal, you will also be able to view your health information using other applications (apps) compatible with our system.

## 2021-03-29 NOTE — ED PROVIDER NOTE - CRANIAL NERVE AND PUPILLARY EXAM
apparent left lateral field cut, no other findings./cranial nerves 2-12 intact/central vision intact/CENTRAL VISION NOT INTACT

## 2021-03-29 NOTE — ED PROVIDER NOTE - NEUROLOGICAL, MLM
Alert and oriented, no focal deficits, no motor or sensory deficits. Ataxia gait. Abnormal Romberg test  and slightly slurred speech.

## 2021-03-29 NOTE — CONSULT NOTE ADULT - TIME BILLING
Above assessment and plan was discussed with on call neurology resident overnight on telephone and I agree with above assessment and plan and outpatient neurology follow up as patient's neurologic symptoms improved.

## 2021-03-29 NOTE — ED PROVIDER NOTE - CLINICAL SUMMARY MEDICAL DECISION MAKING FREE TEXT BOX
44 y/o male presents to ED with several neurological complaints corresponding findings in physical exam. Symptoms might be related to medicine toxicity vs CVA vs metabolic derangements. Plan to send labs, obtain CT, neuro consult and reassess. 44 y/o male presents to ED with several neurological complaints and corresponding findings in physical exam. Symptoms might be related to medicine toxicity vs CVA vs metabolic derangements. Plan to send labs, obtain CT, neuro consult and reassess.

## 2021-03-29 NOTE — ED PROVIDER NOTE - ATTESTATION, MLM
HPT pending progress./yes I have reviewed and confirmed nurses' notes for patient's medications, allergies, medical history, and surgical history.

## 2021-03-29 NOTE — ED ADULT TRIAGE NOTE - CHIEF COMPLAINT QUOTE
pt here for generalized weakness, headache, right arm itchiness and abnormal gait that started 3 days ago. pt also c/o itchiness to bilateral eyes x 3 months, worse today

## 2021-03-29 NOTE — ED PROVIDER NOTE - NSFOLLOWUPCLINICS_GEN_ALL_ED_FT
[facility]  Neurology Autoimmune Encephalitis  [address] [place] [id number]  Phone: [phone]  Fax: [phone]  Follow Up Time: 1-3 Days

## 2021-03-29 NOTE — ED ADULT NURSE NOTE - NS PRO AD NO ADVANCE DIRECTIVE
No Duration Of Freeze Thaw-Cycle (Seconds): 2 Number Of Freeze-Thaw Cycles: 1 freeze-thaw cycle Render Post-Care Instructions In Note?: no Detail Level: Detailed Post-Care Instructions: I reviewed with the patient in detail post-care instructions. Patient is to wear sunprotection, and avoid picking at any of the treated lesions. Pt may apply Vaseline to crusted or scabbing areas. Consent: The patient's consent was obtained including but not limited to risks of crusting, scabbing, blistering, scarring, darker or lighter pigmentary change, recurrence, incomplete removal and infection.

## 2021-03-29 NOTE — ED PROVIDER NOTE - OBJECTIVE STATEMENT
46 y/o male with hx of seizures presents to ED c/o difficulty walking, slurred speech, and severe headaches for the past 6 days. Pt states he has had similar episodes in the past but never this severe. Pt denies n/v/d, trauma, fever, chills, sick contact, recent travel, or any other medical problems.

## 2021-03-29 NOTE — CONSULT NOTE ADULT - SUBJECTIVE AND OBJECTIVE BOX
NEUROLOGY CONSULT   HPI: LESA PABLO is a 45y ***-handed man with a PMH of *** who presented on 03-29-21 with .      Patient is a 45y old  Male who presents with a chief complaint of   HPI:      ROS: A 10-system ROS was performed and is negative except for those items noted above.     PMH:  Seizure Disorder    Hypertension        Home Medications:  atenolol:  (27 Feb 2020 17:40)  Dilantin: 400  orally 2 times a day (27 Feb 2020 17:40)    MEDICATIONS  (STANDING):    MEDICATIONS  (PRN):      No Known Allergies      No significant past surgical history        Social History:      No pertinent family history in first degree relatives        OBJECTIVE  Vital Signs Last 24 Hrs  T(C): 36.3 (29 Mar 2021 19:09), Max: 36.6 (29 Mar 2021 18:11)  T(F): 97.4 (29 Mar 2021 19:09), Max: 97.9 (29 Mar 2021 18:11)  HR: 61 (29 Mar 2021 19:09) (61 - 66)  BP: 159/98 (29 Mar 2021 19:09) (159/98 - 185/85)  BP(mean): --  RR: 16 (29 Mar 2021 19:09) (16 - 16)  SpO2: 100% (29 Mar 2021 19:09) (100% - 100%)  I&O's Summary    Height (cm): 172.7 (03-29)    PHYSICAL EXAM:  General: Appears stated age, in NAD  Neurologic:  Mental Status: Awake, alert, oriented to person, place, situation, time. Normal affect. Follows all commands.    Language: Speech is clear, fluent with preserved naming, repetition and comprehension.      Cranial Nerves: PERRLA (R = 3mm, L = 3mm). Visual fields intact. EOMI no nystagmus. V1-3 intact to light touch.  No facial asymmetry b/l, full eye closure strength b/l. Hearing grossly normal to conversation.  Symmetric palate elevation in midline.  Head turning & shoulder shrug intact b/l. Tongue midline, normal movements, no atrophy.  Motor: Normal muscle bulk & tone. No noticeable tremor, myoclonus or pronator drift. ***/5 strength.	  Sensation: Symmetric B/L preserved sensation to light touch, pin prick, position.    Cortical: No extinction on double simultaneous touch and no signs of neglect.   Reflexes: ***/4.  Plantar Responses are ***.   Coordination: Intact rapid-alternating movements. No dysmetria on finger-to-nose or heel-to-shin.  No dysdiadodyskinesia  Gait: Normal stance, stride length, touch off, arm swing and adelina.   Normal Romberg. No postural instability.                         13.4   6.50  )-----------( 222      ( 29 Mar 2021 20:32 )             44.1     03-29    138  |  101  |  10  ----------------------------<  80  4.5   |  26  |  1.12    Ca    9.2      29 Mar 2021 20:32    TPro  7.9  /  Alb  4.6  /  TBili  <0.2  /  DBili  x   /  AST  33  /  ALT  26  /  AlkPhos  145<H>  03-29          Imaging/Studies:           NEUROLOGY CONSULT   HPI: 45y R-handed man with a PMH of generalized epilepsy on  BID who presented on 03-29-21 with 6 days of worsening pressure like HA in his neck, head and periorbital region a/w internal sense of vertigo.  PT notes he had a gradual onset 7/10 HA which he has had in the past and about 2 months of periorbital pain.  Endorses some photophobia but denies any phonophobia, nausea or vomiting.  Endorses significant stressors at home and at work as well as associated neck pain.  Describes vertigo as internal sense of motion which is hard for him to fully describe.  HA worsens when he stands. No associated other neurologic signs/symptoms.  He takes his phenytoin regularly and denies any recent changes in dosing or frequency.  Of note, he has had several ED visits in the past in which he has noted dizziness and HA as complaint.  Has not had a seizure since 12/2019.      ROS: A 10-system ROS was performed and is negative except for those items noted above.     PMH:  Seizure Disorder  Hypertension    Home Medications:  atenolol:  (27 Feb 2020 17:40)  Dilantin: 400  orally 2 times a day (27 Feb 2020 17:40)    ALL: No Known Allergies    PSH: No significant past surgical history      Social History: Denies tobacco, alcohol or drug use. Works as .     FH: No pertinent family history in first degree relatives    OBJECTIVE  Vital Signs Last 24 Hrs  T(C): 36.3 (29 Mar 2021 19:09), Max: 36.6 (29 Mar 2021 18:11)  T(F): 97.4 (29 Mar 2021 19:09), Max: 97.9 (29 Mar 2021 18:11)  HR: 61 (29 Mar 2021 19:09) (61 - 66)  BP: 159/98 (29 Mar 2021 19:09) (159/98 - 185/85)  BP(mean): --  RR: 16 (29 Mar 2021 19:09) (16 - 16)  SpO2: 100% (29 Mar 2021 19:09) (100% - 100%)  I&O's Summary  Height (cm): 172.7 (03-29)  PHYSICAL EXAM:  General: Appears stated age, in NAD  HEENT: Normocephalic, atraumatic, significant paraspinal musculature tenderness.  Tenderness to palpation of cervical paraspinal muscles    Neurologic:  Mental Status: Awake, alert, oriented to person, place, situation, time. Normal affect. Follows all commands.  Language: Speech is clear, fluent with preserved naming, repetition and comprehension.  One sentence had a stuttering type adelina, which PT endorsed happens when he is stressed.    Cranial Nerves: PERRLA (R = 2mm, L = 2mm). Visual fields intact. EOMI no nystagmus. V1-3 intact to light touch.  No facial asymmetry b/l, full eye closure strength b/l. Hearing grossly normal to conversation.  Symmetric palate elevation in midline.  Head turning & shoulder shrug intact b/l. Tongue midline, normal movements, no atrophy.  No papilledema.    Motor: Normal muscle bulk & tone. No noticeable tremor, myoclonus or pronator drift. 5/5 strength throughout.	  Sensation: Symmetric B/L preserved sensation to light touch, pin prick, position.    Cortical: No extinction on double simultaneous touch and no signs of neglect.   Reflexes: 2/4 throughout.  Plantar Responses are downgoing B/L   Coordination: Intact rapid-alternating movements. No dysmetria on finger-to-nose or heel-to-shin.  No dysdiadochokinesia  Gait: Initially widened stance and a stiff wide based gait with reduced arm swing.  Trouble with tandem forward, but did not fall over and was able to walk backwards unassisted normally.  Re-tested gait after performing OMT muscle energy technique to suboccipital muscles and PT improved with normal stance, still a slight reduced stride length but no longer wide based. + Romberg               13.4   6.50  )-----------( 222      ( 29 Mar 2021 20:32 )             44.1     03-29    138  |  101  |  10  ----------------------------<  80  4.5   |  26  |  1.12    Ca    9.2      29 Mar 2021 20:32    TPro  7.9  /  Alb  4.6  /  TBili  <0.2  /  DBili  x   /  AST  33  /  ALT  26  /  AlkPhos  145<H>  03-29    CT Head w/o Cont (10.01.13 @ 16:00)   IMPRESSION:   1) UNREMARKABLE CT STUDY OF THE BRAIN.  2) NO MASS OR SPACE OCCUPYING LESION IDENTIFIED.  NO ACUTE CEREBRAL  PATHOLOGY SUGGESTED.  NO COLLECTIONS OR HEMORRHAGIC FOCI IDENTIFIED.  3) CLEAR SINUSES AND MASTOIDS.

## 2021-04-01 LAB — PHENYTOIN FREE SERPL-MCNC: 1.5 UG/ML — SIGNIFICANT CHANGE UP (ref 1–2)

## 2021-05-10 ENCOUNTER — EMERGENCY (EMERGENCY)
Facility: HOSPITAL | Age: 46
LOS: 1 days | Discharge: HOME | End: 2021-05-10
Attending: STUDENT IN AN ORGANIZED HEALTH CARE EDUCATION/TRAINING PROGRAM | Admitting: STUDENT IN AN ORGANIZED HEALTH CARE EDUCATION/TRAINING PROGRAM
Payer: COMMERCIAL

## 2021-05-10 VITALS
SYSTOLIC BLOOD PRESSURE: 180 MMHG | HEART RATE: 63 BPM | DIASTOLIC BLOOD PRESSURE: 95 MMHG | OXYGEN SATURATION: 99 % | RESPIRATION RATE: 18 BRPM | HEIGHT: 68 IN | TEMPERATURE: 98 F

## 2021-05-10 PROCEDURE — 99284 EMERGENCY DEPT VISIT MOD MDM: CPT

## 2021-05-10 RX ORDER — FLUTICASONE PROPIONATE 50 MCG
1 SPRAY, SUSPENSION NASAL
Refills: 0 | Status: DISCONTINUED | OUTPATIENT
Start: 2021-05-10 | End: 2021-05-13

## 2021-05-10 RX ORDER — FLUTICASONE PROPIONATE 50 MCG
1 SPRAY, SUSPENSION NASAL
Qty: 10 | Refills: 0
Start: 2021-05-10 | End: 2021-05-14

## 2021-05-10 RX ORDER — DIPHENHYDRAMINE HCL 50 MG
25 CAPSULE ORAL ONCE
Refills: 0 | Status: COMPLETED | OUTPATIENT
Start: 2021-05-10 | End: 2021-05-10

## 2021-05-10 RX ORDER — LORATADINE 10 MG/1
1 TABLET ORAL
Qty: 5 | Refills: 0
Start: 2021-05-10 | End: 2021-05-14

## 2021-05-10 RX ORDER — DIPHENHYDRAMINE HCL 50 MG
1 CAPSULE ORAL
Qty: 5 | Refills: 0
Start: 2021-05-10 | End: 2021-05-14

## 2021-05-10 RX ORDER — LORATADINE 10 MG/1
10 TABLET ORAL ONCE
Refills: 0 | Status: COMPLETED | OUTPATIENT
Start: 2021-05-10 | End: 2021-05-10

## 2021-05-10 RX ADMIN — Medication 1 SPRAY(S): at 13:34

## 2021-05-10 RX ADMIN — LORATADINE 10 MILLIGRAM(S): 10 TABLET ORAL at 13:04

## 2021-05-10 RX ADMIN — Medication 25 MILLIGRAM(S): at 13:04

## 2021-05-10 NOTE — ED PROVIDER NOTE - PATIENT PORTAL LINK FT
You can access the FollowMyHealth Patient Portal offered by [facility] by registering at the following website: [email or web address]. By joining [employer]’s FollowMyHealth portal, you will also be able to view your health information using other applications (apps) compatible with our system.

## 2021-05-10 NOTE — ED PROVIDER NOTE - OBJECTIVE STATEMENT
45 y/o male with pmhx of seizure disorder on phenytoin, HTN presenting with right eye pain and congestion on right side. Over the past week patient has had runny nose and itchy eyes with recent development of right eye pain on Saturday. Patient reports that he has had these symptoms in his left eye in the past. Reports b/l blurring of vision, wears glasses, and has suffered from sinus headaches in the past. Patient has tried ibuprofen with no relief and also reports neck pain. Patient denies chest pain, SOB, abdominal pain, recent falls, fevers/chills, n/v/d, cough, rashes or changes in urination.

## 2021-05-10 NOTE — ED ADULT NURSE NOTE - NSIMPLEMENTINTERV_GEN_ALL_ED
Implemented All Universal Safety Interventions:  [employer] to call system. Call bell, personal items and telephone within reach. Instruct patient to call for assistance. Room bathroom lighting operational. Non-slip footwear when patient is off stretcher. Physically safe environment: no spills, clutter or unnecessary equipment. Stretcher in lowest position, wheels locked, appropriate side rails in place.

## 2021-05-10 NOTE — ED PROVIDER NOTE - ATTENDING CONTRIBUTION TO CARE
46 y.o M with PMH Seizure disorder on phenytoin , HTN p/w congestion and eye pressure on the right side. pt reports increased congestion and runny nose along with eye itching worse over last  few weeks. he states he has had pressure in his sinus along with eye itching. he denies fever, chills, chest pain. Visual acuity 20/45 w/o glasses . pressures 15 right 18 left. he denies fever, chills, chest pain, recent seziure, head trauma. he took flonase a month ago w/ improvement, he has not taken any medicatiosn recently   denies fever, chills, chest pain, SOB, abdominal pain, diarrhea, dysuria, syncope, bleeding, new rash,weakness, numbness, blurred vision  + nasal congestion   ROS  otherwise negative as per HPI  Gen: Awake, Alert, WD, WN, NAD  Head:  NC/AT  Eyes:  PERRL, EOMI, Conjunctiva pink, lids normal, no scleral icterus. floreceine mild uptake at right medial border possibly 2/2 pterygium. Pressureds left 18 right 15. Visual acuity 20/45 and 20/40 without glasses pt usually weakns glasses   ENT: OP clear, no exudates, no erythema, uvula midline, mild nasal redness , moist mucus membranes  Neck: supple, nontender, no meningismus, no JVD, trachea midline  Cardiac/CV:  S1 S2, RRR, no M/G/R  Respiratory/Pulm:  CTAB, good air movement, normal resp effort, no wheezes/stridor/retractions/rales/rhonchi  Gastrointestinal/Abdomen:  Soft, nontender, nondistended, +BS, no rebound/guarding    Ext:  warm, well perfused, moving all extremities spontaneously, no peripheral edema, distal pulses intact  Skin: intact, no rash  Neuro:  AAOx3, sensation intact, motor 5/5 x 4 extremities, normal gait, speech clear  MDM as above

## 2021-05-10 NOTE — ED ADULT NURSE NOTE - OBJECTIVE STATEMENT
pt received to intake 13, a&ox 4, ambulatory, pmh of HTN, sz disorder, p/w runny nose, sinus pressure, eye pressure, eye itching x1week. Pt breathing even and unlabored on room air. Denies fever, chills, SOB, chest pain, palpitations, dizziness, N/V/D, constipation, numbness, tingling. Medications administered as ordered. to be D/C.

## 2021-05-10 NOTE — ED PROVIDER NOTE - NSFOLLOWUPINSTRUCTIONS_ED_ALL_ED_FT
Please follow up with the Ophthalmologist as we discussed with the information attached. Your symptoms are consistent with your history of allergies and prescriptions were sent to your local pharmacy to take as needed for your symptoms.       WHAT YOU NEED TO KNOW:    What causes eye pain? Eye pain may be caused by a problem within your eye. A problem or condition in another body area can also cause pain that travels to your eye. Eye pain may be caused by any of the following:  •Dry eyes      •An abrasion on your cornea (the surface of your eye)      •A foreign body in your eye      •Inflammation of a nerve, gland, or muscle in your eye      •Certain types of glaucoma (increased pressure inside your eye that can cause vision loss)      •A sinus infection or jaw pain      •Headaches, including migraine or cluster headaches      How is the cause of eye pain diagnosed? Your healthcare provider will examine your eyes and ask when your pain began. He will also ask if you have other symptoms, such as sensitivity to light. Tell him if you ever had eye surgery or an eye injury. Tell him if you wear glasses or contact lenses. Also tell him the names of medicines you take, and if you have allergies or health conditions. You may need the following tests:  •A visual acuity test checks your vision in both eyes. You will be asked to read letters and numbers from a chart.      •A slit-lamp exam uses a microscope to check every part of your eye for inflammation or injury. A dye may be used to look for damage to your cornea.             •A fluorescein stain test uses dye to show if you have a foreign body in your eye. It can also reveal damage to your cornea.      •A tonometry test measures the pressure inside your eye to check for glaucoma. Your healthcare provider will numb your eyes with eyedrops before he checks your eye pressure.      How is eye pain treated?   •Artificial tears are eyedrops that can help moisturize your eyes and relieve your pain. Ask your healthcare provider how often to use artificial tears.      •NSAIDs, such as ibuprofen, help decrease swelling, pain, and fever. This medicine is available with or without a doctor's order. NSAIDs can cause stomach bleeding or kidney problems in certain people. If you take blood thinner medicine, always ask if NSAIDs are safe for you. Always read the medicine label and follow directions. Do not give these medicines to children under 6 months of age without direction from your child's healthcare provider.      When should I contact my healthcare provider?   •You have a fever.      •Your eye pain gets worse when you move your eyes.      •You have questions or concerns about your condition or care.      When should I seek immediate care or call 911?   •You have any vision loss.      •You have sudden vision changes such as blurred vision, double vision, or seeing halos around lights.      •You develop severe eye pain.

## 2021-05-10 NOTE — ED PROVIDER NOTE - NS ED ROS FT
Gen: Denies fever, weight loss  CV: Denies chest pain, palpitations  Skin: Denies rash, erythema, color changes  Resp: Denies SOB, cough  Endo: Denies sensitivity to heat, cold, increased urination  GI: Denies diarrhea, constipation, nausea, vomiting  Msk: Denies back pain, LE swelling, extremity pain  : Denies dysuria, increased frequency  Neuro: Denies LOC, weakness, recent seizures, numbness/tingling

## 2021-05-10 NOTE — ED PROVIDER NOTE - CLINICAL SUMMARY MEDICAL DECISION MAKING FREE TEXT BOX
46 y.o M with PMH Seizure disorder on phenytoin , HTN p/w congestion and eye pressure on the right side. pt reports increased congestion and runny nose along with eye itching worse over last  few weeks. he states he has had pressure in his sinus along with eye itching. he denies fever, chills, chest pain. Visual acuity 20/45 w/o glasses . pressures 15 right 18 left.  . pt w/ sinus congestion and eye irritation will given loratidine, benadryl, flonase, optho follow ip

## 2021-05-10 NOTE — ED ADULT TRIAGE NOTE - CHIEF COMPLAINT QUOTE
Pt complaining of R eye pressure and dizziness since Saturday. Pt denies chest pain, sob, n/v/d, fever or chills.

## 2021-05-10 NOTE — ED PROVIDER NOTE - PHYSICAL EXAMINATION
Gen: WDWN, NAD  HEENT: PERRLA, EOMI, Visual acuity; OD 20/40 OS 20/40, b/l 20/25 (wears glasses but does not have them), IOP right 15 left 18, no abrasions with fluorescin stain,  no nasal discharge, mucous membranes moist  CV: RRR, +S1/S2, no M/R/G  Resp: CTAB, no W/R/R  GI: Abdomen soft non-distended, NTTP, no masses  MSK: No open wounds, no bruising, no LE edema  Neuro: CN2-12 grossly intact, A&Ox4, MS +5/5 in UE and LE BL, finger to nose smooth and rapid, gross sensation intact in UE and LE BL, gait smooth and coordinated, negative rhomberg, negative pronator drift   Psych: appropriate mood

## 2021-06-01 ENCOUNTER — EMERGENCY (EMERGENCY)
Facility: HOSPITAL | Age: 46
LOS: 1 days | Discharge: HOME | End: 2021-06-01
Attending: EMERGENCY MEDICINE | Admitting: EMERGENCY MEDICINE
Payer: COMMERCIAL

## 2021-06-01 VITALS
DIASTOLIC BLOOD PRESSURE: 107 MMHG | RESPIRATION RATE: 18 BRPM | HEIGHT: 68 IN | OXYGEN SATURATION: 100 % | SYSTOLIC BLOOD PRESSURE: 186 MMHG | TEMPERATURE: 98 F | HEART RATE: 80 BPM

## 2021-06-01 VITALS
RESPIRATION RATE: 18 BRPM | DIASTOLIC BLOOD PRESSURE: 85 MMHG | HEART RATE: 60 BPM | OXYGEN SATURATION: 100 % | SYSTOLIC BLOOD PRESSURE: 153 MMHG

## 2021-06-01 LAB
ALBUMIN SERPL ELPH-MCNC: 4 G/DL — SIGNIFICANT CHANGE UP (ref 3.3–5)
ALP SERPL-CCNC: 129 U/L — HIGH (ref 40–120)
ALT FLD-CCNC: 24 U/L — SIGNIFICANT CHANGE UP (ref 4–41)
ANION GAP SERPL CALC-SCNC: 12 MMOL/L — SIGNIFICANT CHANGE UP (ref 7–14)
AST SERPL-CCNC: 26 U/L — SIGNIFICANT CHANGE UP (ref 4–40)
BASOPHILS # BLD AUTO: 0.02 K/UL — SIGNIFICANT CHANGE UP (ref 0–0.2)
BASOPHILS NFR BLD AUTO: 0.4 % — SIGNIFICANT CHANGE UP (ref 0–2)
BILIRUB SERPL-MCNC: <0.2 MG/DL — SIGNIFICANT CHANGE UP (ref 0.2–1.2)
BUN SERPL-MCNC: 9 MG/DL — SIGNIFICANT CHANGE UP (ref 7–23)
CALCIUM SERPL-MCNC: 9 MG/DL — SIGNIFICANT CHANGE UP (ref 8.4–10.5)
CHLORIDE SERPL-SCNC: 103 MMOL/L — SIGNIFICANT CHANGE UP (ref 98–107)
CO2 SERPL-SCNC: 25 MMOL/L — SIGNIFICANT CHANGE UP (ref 22–31)
CREAT SERPL-MCNC: 1.14 MG/DL — SIGNIFICANT CHANGE UP (ref 0.5–1.3)
EOSINOPHIL # BLD AUTO: 0.15 K/UL — SIGNIFICANT CHANGE UP (ref 0–0.5)
EOSINOPHIL NFR BLD AUTO: 2.9 % — SIGNIFICANT CHANGE UP (ref 0–6)
GLUCOSE SERPL-MCNC: 107 MG/DL — HIGH (ref 70–99)
HCT VFR BLD CALC: 42.6 % — SIGNIFICANT CHANGE UP (ref 39–50)
HGB BLD-MCNC: 12.5 G/DL — LOW (ref 13–17)
IANC: 3.13 K/UL — SIGNIFICANT CHANGE UP (ref 1.5–8.5)
IMM GRANULOCYTES NFR BLD AUTO: 0.4 % — SIGNIFICANT CHANGE UP (ref 0–1.5)
LYMPHOCYTES # BLD AUTO: 1.5 K/UL — SIGNIFICANT CHANGE UP (ref 1–3.3)
LYMPHOCYTES # BLD AUTO: 28.6 % — SIGNIFICANT CHANGE UP (ref 13–44)
MCHC RBC-ENTMCNC: 22.3 PG — LOW (ref 27–34)
MCHC RBC-ENTMCNC: 29.3 GM/DL — LOW (ref 32–36)
MCV RBC AUTO: 75.9 FL — LOW (ref 80–100)
MONOCYTES # BLD AUTO: 0.43 K/UL — SIGNIFICANT CHANGE UP (ref 0–0.9)
MONOCYTES NFR BLD AUTO: 8.2 % — SIGNIFICANT CHANGE UP (ref 2–14)
NEUTROPHILS # BLD AUTO: 3.13 K/UL — SIGNIFICANT CHANGE UP (ref 1.8–7.4)
NEUTROPHILS NFR BLD AUTO: 59.5 % — SIGNIFICANT CHANGE UP (ref 43–77)
NRBC # BLD: 0 /100 WBCS — SIGNIFICANT CHANGE UP
NRBC # FLD: 0 K/UL — SIGNIFICANT CHANGE UP
PLATELET # BLD AUTO: 279 K/UL — SIGNIFICANT CHANGE UP (ref 150–400)
POTASSIUM SERPL-MCNC: 3.9 MMOL/L — SIGNIFICANT CHANGE UP (ref 3.5–5.3)
POTASSIUM SERPL-SCNC: 3.9 MMOL/L — SIGNIFICANT CHANGE UP (ref 3.5–5.3)
PROT SERPL-MCNC: 7.2 G/DL — SIGNIFICANT CHANGE UP (ref 6–8.3)
RBC # BLD: 5.61 M/UL — SIGNIFICANT CHANGE UP (ref 4.2–5.8)
RBC # FLD: 15.2 % — HIGH (ref 10.3–14.5)
SODIUM SERPL-SCNC: 140 MMOL/L — SIGNIFICANT CHANGE UP (ref 135–145)
WBC # BLD: 5.25 K/UL — SIGNIFICANT CHANGE UP (ref 3.8–10.5)
WBC # FLD AUTO: 5.25 K/UL — SIGNIFICANT CHANGE UP (ref 3.8–10.5)

## 2021-06-01 PROCEDURE — 71045 X-RAY EXAM CHEST 1 VIEW: CPT | Mod: 26

## 2021-06-01 PROCEDURE — 99284 EMERGENCY DEPT VISIT MOD MDM: CPT

## 2021-06-01 RX ORDER — FLUTICASONE PROPIONATE 50 MCG
1 SPRAY, SUSPENSION NASAL ONCE
Refills: 0 | Status: COMPLETED | OUTPATIENT
Start: 2021-06-01 | End: 2021-06-01

## 2021-06-01 RX ORDER — LORATADINE 10 MG/1
10 TABLET ORAL DAILY
Refills: 0 | Status: DISCONTINUED | OUTPATIENT
Start: 2021-06-01 | End: 2021-06-04

## 2021-06-01 RX ADMIN — LORATADINE 10 MILLIGRAM(S): 10 TABLET ORAL at 10:23

## 2021-06-01 NOTE — ED PROVIDER NOTE - NSFOLLOWUPINSTRUCTIONS_ED_ALL_ED_FT
You were seen in the Emergency Department for allergic rhinitis.    Follow up with your primary care provider within the week days.     You can purchase over the counter Loratadine for your symptoms.    Continue to take your flonase/fluticasone as prescribed.    Follow up with an ent within the next two weeks. Their information has been provided for your convenience.     If you have worsening or new concerning symptoms, if you develop chest pain, fever, chills, nausea, vomiting, new or worsening pain, or if you have any new symptoms return to the Emergency Department.

## 2021-06-01 NOTE — ED PROVIDER NOTE - ATTENDING CONTRIBUTION TO CARE
DR. BLOCH, ATTENDING MD-  I performed a face to face bedside interview with patient regarding history of present illness, review of symptoms and past medical history. I completed an independent physical exam.  I have discussed patient's plan of care with the resident.  Patient examined, well appearing , NAD HEENT  swollen and edematous nasal mucosa,nml heart s1s2, lungs clear, no adenopathy, neuro nml

## 2021-06-01 NOTE — ED ADULT TRIAGE NOTE - DOMESTIC TRAVEL HIGH RISK QUESTION
No I will SWITCH the dose or number of times a day I take the medications listed below when I get home from the hospital:  None

## 2021-06-01 NOTE — ED ADULT TRIAGE NOTE - CHIEF COMPLAINT QUOTE
Pt c/o sinus pressure and congestion, feeling "warm", and sore throat x 2 weeks. Pt also reports intermittent midsternal chest "squeezing". States after eating, he feels pain to right side of his body.

## 2021-06-01 NOTE — ED PROVIDER NOTE - PATIENT PORTAL LINK FT
You can access the FollowMyHealth Patient Portal offered by [facility] by registering at the following website: [email or web address]. By joining [employer]’s FollowMyHealth portal, you will also be able to view your health information using other applications (apps) compatible with our system.

## 2021-06-01 NOTE — ED ADULT NURSE NOTE - OBJECTIVE STATEMENT
Pt received to intake area complaining of sinus pressure and congestions x2 weeks. Pt denies chest pain, sob, n/v/d, fever or chills.,

## 2021-06-01 NOTE — ED PROVIDER NOTE - NS ED ROS FT
Constitutional:  (-) fever, (-) chills, (-) lethargy  Eyes:  (-) eye pain (-) visual changes  ENMT: (+) nasal discharge, (+) sore throat. (-) neck pain or stiffness  Cardiac: (-) chest pain (-) palpitations  Respiratory:  (+) cough (-) respiratory distress.   GI:  (-) nausea (-) vomiting (-) diarrhea (-) abdominal pain.  :  (-) dysuria (-) frequency (-) burning.  MS:  (-) back pain (-) joint pain.  Neuro:  (-) headache (-) numbness (-) tingling (-) focal weakness  Skin:  (-) rash  Except as documented in the HPI,  all other systems are negative

## 2021-06-01 NOTE — ED PROVIDER NOTE - PHYSICAL EXAMINATION
CONSTITUTIONAL: well-appearing, in NAD  SKIN: Warm dry, normal skin turgor  HEAD: NCAT  EYES: edematous conjunctiva w/ b/l eye tearing  ENT: nasal turbinates edematous and erythematous w/ clear discharge, post-nasal drip visualized in oropharynx, no bleeding or erythema present  NECK: Supple; non tender. Full ROM.  CARD: RRR, no murmurs.  RESP: clear to ausculation b/l. No crackles or wheezing.  ABD: soft, non-tender, non-distended, no rebound or guarding.  EXT: no pedal edema, no calf tenderness  PSYCH: Cooperative, appropriate.

## 2021-06-01 NOTE — ED PROVIDER NOTE - CLINICAL SUMMARY MEDICAL DECISION MAKING FREE TEXT BOX
46M pmh seizures, HTN previous hx of allergic rhinitis presents to ED for 2 weeks of nasal discharge, postnasal drip, cough w/ 1 episode of blood tinged cough, nasal swelling, itchy throat, pt denies fevers/chills, denies n/v, denies weight loss/night sweats/change in appetite, pt states he has been taking fluticasone @ home without improvement, turbinate edema, watery eyes, post nasal drip, likely allergic rhinitis, low likelihood viral infection not suspecting bacterial inflection, will instruct pt on use of fluticasone, give oral antihistamine, cxr cbc cmp, r/o visible chest mass/TB

## 2021-06-01 NOTE — ED PROVIDER NOTE - OBJECTIVE STATEMENT
46M pmh seizures, HTN previous hx of allergic rhinitis presents to ED for 2 weeks of nasal discharge, postnasal drip, cough w/ 1 episode of blood tinged cough, nasal swelling, itchy throat, pt denies fevers/chills, denies n/v, denies weight loss/night sweats/change in appetite, pt states he has been taking fluticasone @ home without improvement, sometimes takes benadryl but avoids it due to drowsiness, pt states he did not see an ophthalmologist as recommended from last ED visit 1 month ago. Pt states he also feels like he has an itchy feeling in his throat when swallowing but is unsure if it is allergy related.

## 2021-06-17 ENCOUNTER — EMERGENCY (EMERGENCY)
Facility: HOSPITAL | Age: 46
LOS: 0 days | Discharge: HOME | End: 2021-06-17
Payer: COMMERCIAL

## 2021-06-17 VITALS
SYSTOLIC BLOOD PRESSURE: 159 MMHG | TEMPERATURE: 98 F | DIASTOLIC BLOOD PRESSURE: 97 MMHG | OXYGEN SATURATION: 97 % | HEART RATE: 69 BPM | RESPIRATION RATE: 18 BRPM

## 2021-06-17 VITALS
RESPIRATION RATE: 18 BRPM | TEMPERATURE: 98 F | WEIGHT: 182.1 LBS | SYSTOLIC BLOOD PRESSURE: 163 MMHG | DIASTOLIC BLOOD PRESSURE: 96 MMHG | HEART RATE: 63 BPM | OXYGEN SATURATION: 98 % | HEIGHT: 68 IN

## 2021-06-17 DIAGNOSIS — I10 ESSENTIAL (PRIMARY) HYPERTENSION: ICD-10-CM

## 2021-06-17 DIAGNOSIS — H57.11 OCULAR PAIN, RIGHT EYE: ICD-10-CM

## 2021-06-17 DIAGNOSIS — G40.909 EPILEPSY, UNSPECIFIED, NOT INTRACTABLE, WITHOUT STATUS EPILEPTICUS: ICD-10-CM

## 2021-06-17 DIAGNOSIS — Z79.1 LONG TERM (CURRENT) USE OF NON-STEROIDAL ANTI-INFLAMMATORIES (NSAID): ICD-10-CM

## 2021-06-17 PROCEDURE — 99282 EMERGENCY DEPT VISIT SF MDM: CPT

## 2021-06-17 NOTE — ED PROVIDER NOTE - CLINICAL SUMMARY MEDICAL DECISION MAKING FREE TEXT BOX
45 yo M c/o right eye pain over 2 weeks intermittently. denies any traum , he has used OTC med for dry eye. no visual changes. non contact wearer. no other complaints at this time  exam unremarkable   will refer to ophthalmology  Dc

## 2021-06-17 NOTE — ED PROVIDER NOTE - NSFOLLOWUPCLINICS_GEN_ALL_ED_FT
[facility] - Ophthalmology  Ophthalmology  [address], [address]  [place] [address]  Phone: [phone]  Fax:   Follow Up Time: Urgent

## 2021-06-17 NOTE — ED PROVIDER NOTE - OBJECTIVE STATEMENT
47 yo M c/o right eye pain over 2 weeks intermittently. denies any traum , he has used OTC med for dry eye. no visual changes. non contact wearer. no other complaints at this time

## 2021-06-17 NOTE — ED PROVIDER NOTE - PATIENT PORTAL LINK FT
You can access the FollowMyHealth Patient Portal offered by [facility] by registering at the following website: [email or web address]. By joining [employer]’s FollowMyHealth portal, you will also be able to view your health information using other applications (apps) compatible with our system.

## 2021-06-17 NOTE — ED PROVIDER NOTE - PHYSICAL EXAMINATION
I have reviewed the triage vital signs  Const: Well nourished, well developed, appears stated age  Eyes: PERRL, no conjunctival injection Visula acutiy bilaterally 20/40, no flourscein uptake, unable to obtain preseeure  HENT: NCAT, Neck supple without meningismus  CV: RRR, Warm, well-perfused extremities  RESP: CTAB, Unlabored respiratory effort  GI: soft, non-tender, non-distended, no masses  MSK: No gross deformities appreciated  Skin: Warm, dry. No rashes  Neuro: Alert, CNs II-XII grossly intact. Sensation and motor function of extremities grossly intact.  Psych: Appropriate mood and affect

## 2021-06-17 NOTE — ED ADULT TRIAGE NOTE - NSSEPSISSUSPECTED_ED_A_ED
[FreeTextEntry1] : I, Chinmay Carbone, acted solely as a scribe for Dr. Olimpia Narvaez on 01/7/2020 No

## 2021-06-17 NOTE — ED ADULT NURSE NOTE - OBJECTIVE STATEMENT
Patient c/o bilateral eye irritation and pain X 1 week. Denies trauma or injury. PMH HTN & seizure disorder.

## 2021-06-17 NOTE — ED PROVIDER NOTE - NS ED ROS FT
In addition to that documented in the HPI above, the additional ROS was obtained:  Constitutional: Denies fevers or chills  Eyes: Denies vision changes  ENMT: Denies sore throat  CV: Denies chest pain  Resp: Denies SOB  GI: Denies vomiting or diarrhea  : Denies painful urination  MSK: Denies recent trauma  Skin: Denies new rashes  Neuro: Denies new numbness or tingling or weakness  Endocrine: Denies unexpected weight loss  Heme: Denies bleeding disorders

## 2021-07-06 ENCOUNTER — APPOINTMENT (OUTPATIENT)
Dept: OTOLARYNGOLOGY | Facility: CLINIC | Age: 46
End: 2021-07-06
Payer: COMMERCIAL

## 2021-07-06 VITALS
SYSTOLIC BLOOD PRESSURE: 155 MMHG | BODY MASS INDEX: 27.58 KG/M2 | WEIGHT: 182 LBS | HEART RATE: 74 BPM | HEIGHT: 68 IN | DIASTOLIC BLOOD PRESSURE: 107 MMHG

## 2021-07-06 DIAGNOSIS — R13.10 DYSPHAGIA, UNSPECIFIED: ICD-10-CM

## 2021-07-06 DIAGNOSIS — H93.13 TINNITUS, BILATERAL: ICD-10-CM

## 2021-07-06 DIAGNOSIS — R04.2 HEMOPTYSIS: ICD-10-CM

## 2021-07-06 DIAGNOSIS — H92.03 OTALGIA, BILATERAL: ICD-10-CM

## 2021-07-06 DIAGNOSIS — Z86.79 PERSONAL HISTORY OF OTHER DISEASES OF THE CIRCULATORY SYSTEM: ICD-10-CM

## 2021-07-06 DIAGNOSIS — R42 DIZZINESS AND GIDDINESS: ICD-10-CM

## 2021-07-06 DIAGNOSIS — Z83.3 FAMILY HISTORY OF DIABETES MELLITUS: ICD-10-CM

## 2021-07-06 DIAGNOSIS — R47.01 APHASIA: ICD-10-CM

## 2021-07-06 PROCEDURE — 99204 OFFICE O/P NEW MOD 45 MIN: CPT | Mod: 25

## 2021-07-06 PROCEDURE — 99072 ADDL SUPL MATRL&STAF TM PHE: CPT

## 2021-07-06 PROCEDURE — 31575 DIAGNOSTIC LARYNGOSCOPY: CPT

## 2021-07-06 PROCEDURE — 31231 NASAL ENDOSCOPY DX: CPT | Mod: 59

## 2021-07-06 RX ORDER — OMEPRAZOLE 40 MG/1
40 CAPSULE, DELAYED RELEASE ORAL
Qty: 60 | Refills: 1 | Status: ACTIVE | COMMUNITY
Start: 2021-07-06 | End: 1900-01-01

## 2021-07-06 NOTE — HISTORY OF PRESENT ILLNESS
[de-identified] : 46 year old male referred by ER attending MD Chuckie Kevin for rhinitis. States daily nasal congestion, with thick clear discharge.  Using fluticasone without improvement states sinus pressure/ pain, post nasal drip and throat pain.  Patient is a very poor historian with a suggestion of possible aphasia.  Patient also complains of dysphagia, odynophagia, but no  dyspnea, dysphonia, throat infections.  States that when he wakes up in the morning sees blood on his pillow but denies had any type of nosebleeds or blood from the nasal cavity.  States bilateral ear fullness, otalgia, tinnitus mostly on left side, hearing loss,  states left otorrhea, dizziness.  Denies vertigo.  Patient’s blood pressure was 155/107. Pt was advised to follow up with PCP for high blood pressure.\par  \par

## 2021-07-06 NOTE — PHYSICAL EXAM
[Normal] : no neck adenopathy [FreeTextEntry1] : Possible aphasia [Nasal Endoscopy Performed] : nasal endoscopy was performed, see procedure section for findings [de-identified] : No blood seen [de-identified] : Poor gums [de-identified] : Poor dentition

## 2021-07-06 NOTE — REASON FOR VISIT
[Initial Evaluation] : an initial evaluation for [FreeTextEntry2] : referred by ER attending MD Chuckie Kevin for rhinitis

## 2021-07-06 NOTE — PROCEDURE
[FreeTextEntry6] : Nasal Endoscopy (35916)\par \par After informed verbal consent, nasal endoscopy was performed due to anterior rhinoscopy insufficient to account for symptoms.  [ Flexible , scope # [] was used.  Topical vasoconstrictor and anesthetic was used.  The exam showed a deviated septum to [ right ][ left ][ midline ]. \par \par The nasal endoscope is passed via the right nasal cavity. The inferior, middle, and superior turbinates responded to vasoconstrictors. The inferior, middle and superior meati were examined. The osteomeatal complex is clear with no polyposis or purulence. The sphenoethmoidal recess is clear with no polyposis or purulence. The choana is patent. \par \par The nasal endoscope is passed via the left nasal cavity. The inferior, middle, and superior turbinates responded to vasoconstrictors. The inferior, middle and superior meati were examined. The osteomeatal complex shows a small polyp in the middle meatus that did not appear to be obstructive no purulence. The sphenoethmoidal recess is clear with no polyposis or purulence. The choana is patent.  \par \par There is []% obstruction of the nasopharynx with adenoid tissue.\par \par  [de-identified] : Fiberoptic Laryngoscopy (91433)\par \par Procedure performed: Fiberoptic Laryngeal Endoscopy - Diagnostic\par Pre-op/post op indication: Dysphagia\par Patient was unable to cooperate with mirror. After informed verbal consent is obtained, the fiberoptic nasal endoscope # []  is passed via the both] nasal cavity. \par Findings: base of tongue and vallecula clear, hypopharynx normal without pooled secretions, crisp epiglottis, no evidence of laryngomalacia, bilateral vocal fold mobility full and symmetric. There was  significant arytenoids edema and  erythema seen , without  mass or lesions.. No evidence of any blood in the hypopharynx or larynx.\par

## 2021-07-06 NOTE — CONSULT LETTER
[Dear  ___] : Dear  [unfilled], [Consult Letter:] : I had the pleasure of evaluating your patient, [unfilled]. [Please see my note below.] : Please see my note below. [Consult Closing:] : Thank you very much for allowing me to participate in the care of this patient.  If you have any questions, please do not hesitate to contact me. [Sincerely,] : Sincerely, [FreeTextEntry2] : PCP Morro Castillo\par [id number]\par [facility] [address]\par [phone] [FreeTextEntry3] : Kalyan Beckman MD, FACS \par  of Otolaryngology  \par [facility] \par [address] \[place] [address] \par Phone: [phone] \par Fax: [phone] \par \par

## 2021-08-01 ENCOUNTER — EMERGENCY (EMERGENCY)
Facility: HOSPITAL | Age: 46
LOS: 1 days | Discharge: HOME | End: 2021-08-01
Attending: STUDENT IN AN ORGANIZED HEALTH CARE EDUCATION/TRAINING PROGRAM | Admitting: STUDENT IN AN ORGANIZED HEALTH CARE EDUCATION/TRAINING PROGRAM
Payer: COMMERCIAL

## 2021-08-01 VITALS
RESPIRATION RATE: 18 BRPM | DIASTOLIC BLOOD PRESSURE: 89 MMHG | SYSTOLIC BLOOD PRESSURE: 164 MMHG | HEIGHT: 68 IN | HEART RATE: 74 BPM | TEMPERATURE: 98 F | OXYGEN SATURATION: 100 %

## 2021-08-01 VITALS
SYSTOLIC BLOOD PRESSURE: 171 MMHG | TEMPERATURE: 97 F | OXYGEN SATURATION: 100 % | HEART RATE: 58 BPM | DIASTOLIC BLOOD PRESSURE: 99 MMHG | RESPIRATION RATE: 16 BRPM

## 2021-08-01 LAB
ALBUMIN SERPL ELPH-MCNC: 4.2 G/DL — SIGNIFICANT CHANGE UP (ref 3.3–5)
ALP SERPL-CCNC: 124 U/L — HIGH (ref 40–120)
ALT FLD-CCNC: 20 U/L — SIGNIFICANT CHANGE UP (ref 4–41)
ANION GAP SERPL CALC-SCNC: 10 MMOL/L — SIGNIFICANT CHANGE UP (ref 7–14)
AST SERPL-CCNC: 23 U/L — SIGNIFICANT CHANGE UP (ref 4–40)
BASOPHILS # BLD AUTO: 0.03 K/UL — SIGNIFICANT CHANGE UP (ref 0–0.2)
BASOPHILS NFR BLD AUTO: 0.5 % — SIGNIFICANT CHANGE UP (ref 0–2)
BILIRUB SERPL-MCNC: <0.2 MG/DL — SIGNIFICANT CHANGE UP (ref 0.2–1.2)
BUN SERPL-MCNC: 11 MG/DL — SIGNIFICANT CHANGE UP (ref 7–23)
CALCIUM SERPL-MCNC: 8.8 MG/DL — SIGNIFICANT CHANGE UP (ref 8.4–10.5)
CHLORIDE SERPL-SCNC: 103 MMOL/L — SIGNIFICANT CHANGE UP (ref 98–107)
CO2 SERPL-SCNC: 26 MMOL/L — SIGNIFICANT CHANGE UP (ref 22–31)
CREAT SERPL-MCNC: 1.26 MG/DL — SIGNIFICANT CHANGE UP (ref 0.5–1.3)
EOSINOPHIL # BLD AUTO: 0.16 K/UL — SIGNIFICANT CHANGE UP (ref 0–0.5)
EOSINOPHIL NFR BLD AUTO: 2.5 % — SIGNIFICANT CHANGE UP (ref 0–6)
GLUCOSE SERPL-MCNC: 89 MG/DL — SIGNIFICANT CHANGE UP (ref 70–99)
HCT VFR BLD CALC: 42 % — SIGNIFICANT CHANGE UP (ref 39–50)
HGB BLD-MCNC: 12.5 G/DL — LOW (ref 13–17)
IANC: 3.62 K/UL — SIGNIFICANT CHANGE UP (ref 1.5–8.5)
IMM GRANULOCYTES NFR BLD AUTO: 0.2 % — SIGNIFICANT CHANGE UP (ref 0–1.5)
LYMPHOCYTES # BLD AUTO: 1.99 K/UL — SIGNIFICANT CHANGE UP (ref 1–3.3)
LYMPHOCYTES # BLD AUTO: 31.4 % — SIGNIFICANT CHANGE UP (ref 13–44)
MCHC RBC-ENTMCNC: 22.2 PG — LOW (ref 27–34)
MCHC RBC-ENTMCNC: 29.8 GM/DL — LOW (ref 32–36)
MCV RBC AUTO: 74.6 FL — LOW (ref 80–100)
MONOCYTES # BLD AUTO: 0.53 K/UL — SIGNIFICANT CHANGE UP (ref 0–0.9)
MONOCYTES NFR BLD AUTO: 8.4 % — SIGNIFICANT CHANGE UP (ref 2–14)
NEUTROPHILS # BLD AUTO: 3.62 K/UL — SIGNIFICANT CHANGE UP (ref 1.8–7.4)
NEUTROPHILS NFR BLD AUTO: 57 % — SIGNIFICANT CHANGE UP (ref 43–77)
NRBC # BLD: 0 /100 WBCS — SIGNIFICANT CHANGE UP
NRBC # FLD: 0 K/UL — SIGNIFICANT CHANGE UP
NT-PROBNP SERPL-SCNC: 8 PG/ML — SIGNIFICANT CHANGE UP
PLATELET # BLD AUTO: 276 K/UL — SIGNIFICANT CHANGE UP (ref 150–400)
POTASSIUM SERPL-MCNC: 4.4 MMOL/L — SIGNIFICANT CHANGE UP (ref 3.5–5.3)
POTASSIUM SERPL-SCNC: 4.4 MMOL/L — SIGNIFICANT CHANGE UP (ref 3.5–5.3)
PROCALCITONIN SERPL-MCNC: 0.05 NG/ML — SIGNIFICANT CHANGE UP (ref 0.02–0.1)
PROT SERPL-MCNC: 7.3 G/DL — SIGNIFICANT CHANGE UP (ref 6–8.3)
RBC # BLD: 5.63 M/UL — SIGNIFICANT CHANGE UP (ref 4.2–5.8)
RBC # FLD: 14.1 % — SIGNIFICANT CHANGE UP (ref 10.3–14.5)
SARS-COV-2 RNA SPEC QL NAA+PROBE: SIGNIFICANT CHANGE UP
SODIUM SERPL-SCNC: 139 MMOL/L — SIGNIFICANT CHANGE UP (ref 135–145)
TROPONIN T, HIGH SENSITIVITY RESULT: 6 NG/L — SIGNIFICANT CHANGE UP
TROPONIN T, HIGH SENSITIVITY RESULT: <6 NG/L — SIGNIFICANT CHANGE UP
WBC # BLD: 6.34 K/UL — SIGNIFICANT CHANGE UP (ref 3.8–10.5)
WBC # FLD AUTO: 6.34 K/UL — SIGNIFICANT CHANGE UP (ref 3.8–10.5)

## 2021-08-01 PROCEDURE — 71045 X-RAY EXAM CHEST 1 VIEW: CPT | Mod: 26

## 2021-08-01 PROCEDURE — 99285 EMERGENCY DEPT VISIT HI MDM: CPT | Mod: 25

## 2021-08-01 PROCEDURE — 93010 ELECTROCARDIOGRAM REPORT: CPT

## 2021-08-01 RX ORDER — ASPIRIN/CALCIUM CARB/MAGNESIUM 324 MG
162 TABLET ORAL ONCE
Refills: 0 | Status: COMPLETED | OUTPATIENT
Start: 2021-08-01 | End: 2021-08-01

## 2021-08-01 RX ORDER — ACETAMINOPHEN 500 MG
650 TABLET ORAL ONCE
Refills: 0 | Status: COMPLETED | OUTPATIENT
Start: 2021-08-01 | End: 2021-08-01

## 2021-08-01 RX ORDER — FAMOTIDINE 10 MG/ML
20 INJECTION INTRAVENOUS ONCE
Refills: 0 | Status: COMPLETED | OUTPATIENT
Start: 2021-08-01 | End: 2021-08-01

## 2021-08-01 RX ADMIN — Medication 162 MILLIGRAM(S): at 15:02

## 2021-08-01 RX ADMIN — FAMOTIDINE 20 MILLIGRAM(S): 10 INJECTION INTRAVENOUS at 15:15

## 2021-08-01 RX ADMIN — Medication 200 MILLIGRAM(S): at 19:35

## 2021-08-01 RX ADMIN — Medication 30 MILLILITER(S): at 15:03

## 2021-08-01 RX ADMIN — Medication 650 MILLIGRAM(S): at 15:02

## 2021-08-01 NOTE — ED PROVIDER NOTE - NSFOLLOWUPINSTRUCTIONS_ED_ALL_ED_FT
Rest and stay well hydrated.     Follow-up with your PMD in 48 hours for recheck.     Take your home medications as prescribed.     Return to the ED for any worsening chest pain, difficulty breathing, vomiting, fevers, chills, falls, confusion, weakness, or any new concerning symptoms.

## 2021-08-01 NOTE — ED ADULT NURSE NOTE - OBJECTIVE STATEMENT
Pt. A/O x4, ambulatory, presenting to the ED with abdominal, right leg and chest pain. PMH of HTN and seizure disorder. Pt. states he has had right-sided body pain x couple months. Endorses right-sided, intermittent, burning chest pain that began today. States he has had intermittent abdominal pain, abdomen is round soft and tender to palpation. Currently denies chest and abdominal pain, endorses pain down right leg. Denies SOB, nausea, vomiting and headache. On cardiac monitor, NSR. 20 gauge IV in right AC. Will continue to monitor.

## 2021-08-01 NOTE — ED PROVIDER NOTE - OBJECTIVE STATEMENT
47 y/o M PMH seizures, HTN presents to ED c/o multiple medical complaints, including R sided body pain ongoing for months, burning chest pain which started in the ED today, and abdominal pain which comes and goes intermittently. Denies f/c, n/v, dysuria, hematuria. reports cp today lasted several minutes, denies worse w/ exertion, denies cp now or sob. Reports abd pain worse w/ eating, had diarrhea 3 days ago, none since. Denies hx abdominal surgeries. Pt also reports he is out of his phenytoin, tried to get refill from his PCP yesterday but unable to see doctor. last seizure 2020.

## 2021-08-01 NOTE — ED PROVIDER NOTE - PATIENT PORTAL LINK FT
You can access the FollowMyHealth Patient Portal offered by [facility] by registering at the following website: [email or web address]. By joining [employer]’s FollowMyHealth portal, you will also be able to view your health information using other applications (apps) compatible with our system.

## 2021-08-01 NOTE — ED PROVIDER NOTE - ATTENDING CONTRIBUTION TO CARE
45 y/o M with PMH seizures, HTN recent ed visits for nasal congestion and cough in june p/w chills cough and right sided pain. Pt states he has been having some cough which is nonproductive for sputum along with pain in the right side of his chest. Pt states he also has ran out of his seizure medications yesterday. he denies fever. He states that he has had some chills. he denies recent seizure or fall. He states chest pain comes and goes. No abdominal pain or syncope.  denies fever,+ chills,+ chest pain, SOB, abdominal pain, diarrhea, dysuria, syncope, bleeding, new rash,weakness, numbness, blurred vision    ROS  otherwise negative as per HPI  Gen: Awake, Alert, WD, WN, NAD  Head:  NC/AT  Eyes:  PERRL, EOMI, Conjunctiva pink, lids normal, no scleral icterus  ENT: OP clear, no exudates, moist mucus membranes  Neck: supple, nontender, no meningismus, no JVD, trachea midline  Cardiac/CV:  S1 S2, RRR, no M/G/R  Respiratory/Pulm:  CTAB, good air movement, normal resp effort, no wheezes/stridor/retractions/rales/rhonchi  Gastrointestinal/Abdomen:  Soft, nontender, nondistended, +BS, no rebound/guarding  Back:  no CVAT, no MLT  Ext:  warm, well perfused, moving all extremities spontaneously, no peripheral edema, distal pulses intact  Skin: intact, no rash  Neuro:  AAOx3, sensation intact, motor 5/5 x 4 extremities, normal gait, speech clear  MDM as above

## 2021-08-01 NOTE — ED PROVIDER NOTE - PROGRESS NOTE DETAILS
Pt states he is out of his phenytoin takes 100mg BID, states his PMD usually refills it but was not able to this week. pt also on keppra 500mg but has it at home. last took phenytoin this am.

## 2021-08-01 NOTE — ED PROVIDER NOTE - PHYSICAL EXAMINATION
Gen: well developed male NAD   HEENT: NCAT, EOMI, no nasal discharge, mucous membranes moist  CV: RRR, +S1/S2, no M/R/G  Resp: CTAB, no W/R/R  GI: Abdomen soft non-distended, NTTP, no masses  MSK: No open wounds, no bruising, no LE edema  Neuro: CN2-12 grossly intact, A&Ox4, MS +5/5 in UE and LE BL, finger to nose smooth and rapid, gross sensation intact in UE and LE BL, negative pronator drift  Psych: appropriate mood

## 2021-08-01 NOTE — ED PROVIDER NOTE - CLINICAL SUMMARY MEDICAL DECISION MAKING FREE TEXT BOX
47 y/o M PMH seizures, HTN today c/o multiple issues including R sided body pain ongoing for months, burning chest pain which started in the ED today, and abdominal pain which comes and goes intermittently. also reports he is out of his seizure medication phenytoin (takes that and keppra). last seizure 2020. pt neuro intact, well appearing. concern for ACS, GERD, medication refill, URI. plan labs ekg CXR med refill likely dc w/ PMD f/u

## 2021-08-11 ENCOUNTER — EMERGENCY (EMERGENCY)
Facility: HOSPITAL | Age: 46
LOS: 1 days | Discharge: HOME | End: 2021-08-11
Attending: STUDENT IN AN ORGANIZED HEALTH CARE EDUCATION/TRAINING PROGRAM | Admitting: STUDENT IN AN ORGANIZED HEALTH CARE EDUCATION/TRAINING PROGRAM
Payer: COMMERCIAL

## 2021-08-11 VITALS
TEMPERATURE: 98 F | OXYGEN SATURATION: 100 % | HEART RATE: 59 BPM | SYSTOLIC BLOOD PRESSURE: 172 MMHG | RESPIRATION RATE: 17 BRPM | DIASTOLIC BLOOD PRESSURE: 94 MMHG

## 2021-08-11 VITALS
TEMPERATURE: 98 F | RESPIRATION RATE: 18 BRPM | HEIGHT: 68 IN | HEART RATE: 68 BPM | OXYGEN SATURATION: 98 % | SYSTOLIC BLOOD PRESSURE: 193 MMHG | DIASTOLIC BLOOD PRESSURE: 96 MMHG

## 2021-08-11 LAB
ALBUMIN SERPL ELPH-MCNC: 4.1 G/DL — SIGNIFICANT CHANGE UP (ref 3.3–5)
ALP SERPL-CCNC: 118 U/L — SIGNIFICANT CHANGE UP (ref 40–120)
ALT FLD-CCNC: 23 U/L — SIGNIFICANT CHANGE UP (ref 4–41)
ANION GAP SERPL CALC-SCNC: 11 MMOL/L — SIGNIFICANT CHANGE UP (ref 7–14)
APPEARANCE UR: CLEAR — SIGNIFICANT CHANGE UP
AST SERPL-CCNC: 29 U/L — SIGNIFICANT CHANGE UP (ref 4–40)
BASOPHILS # BLD AUTO: 0.03 K/UL — SIGNIFICANT CHANGE UP (ref 0–0.2)
BASOPHILS NFR BLD AUTO: 0.5 % — SIGNIFICANT CHANGE UP (ref 0–2)
BILIRUB SERPL-MCNC: <0.2 MG/DL — SIGNIFICANT CHANGE UP (ref 0.2–1.2)
BILIRUB UR-MCNC: NEGATIVE — SIGNIFICANT CHANGE UP
BUN SERPL-MCNC: 10 MG/DL — SIGNIFICANT CHANGE UP (ref 7–23)
CALCIUM SERPL-MCNC: 8.8 MG/DL — SIGNIFICANT CHANGE UP (ref 8.4–10.5)
CHLORIDE SERPL-SCNC: 102 MMOL/L — SIGNIFICANT CHANGE UP (ref 98–107)
CO2 SERPL-SCNC: 25 MMOL/L — SIGNIFICANT CHANGE UP (ref 22–31)
COLOR SPEC: SIGNIFICANT CHANGE UP
CREAT SERPL-MCNC: 1.19 MG/DL — SIGNIFICANT CHANGE UP (ref 0.5–1.3)
DIFF PNL FLD: NEGATIVE — SIGNIFICANT CHANGE UP
EOSINOPHIL # BLD AUTO: 0.15 K/UL — SIGNIFICANT CHANGE UP (ref 0–0.5)
EOSINOPHIL NFR BLD AUTO: 2.7 % — SIGNIFICANT CHANGE UP (ref 0–6)
GLUCOSE SERPL-MCNC: 87 MG/DL — SIGNIFICANT CHANGE UP (ref 70–99)
GLUCOSE UR QL: NEGATIVE — SIGNIFICANT CHANGE UP
HCT VFR BLD CALC: 40.5 % — SIGNIFICANT CHANGE UP (ref 39–50)
HGB BLD-MCNC: 12.3 G/DL — LOW (ref 13–17)
IANC: 3.11 K/UL — SIGNIFICANT CHANGE UP (ref 1.5–8.5)
IMM GRANULOCYTES NFR BLD AUTO: 0.2 % — SIGNIFICANT CHANGE UP (ref 0–1.5)
KETONES UR-MCNC: NEGATIVE — SIGNIFICANT CHANGE UP
LEUKOCYTE ESTERASE UR-ACNC: NEGATIVE — SIGNIFICANT CHANGE UP
LYMPHOCYTES # BLD AUTO: 1.74 K/UL — SIGNIFICANT CHANGE UP (ref 1–3.3)
LYMPHOCYTES # BLD AUTO: 31.8 % — SIGNIFICANT CHANGE UP (ref 13–44)
MCHC RBC-ENTMCNC: 22.5 PG — LOW (ref 27–34)
MCHC RBC-ENTMCNC: 30.4 GM/DL — LOW (ref 32–36)
MCV RBC AUTO: 74.2 FL — LOW (ref 80–100)
MONOCYTES # BLD AUTO: 0.44 K/UL — SIGNIFICANT CHANGE UP (ref 0–0.9)
MONOCYTES NFR BLD AUTO: 8 % — SIGNIFICANT CHANGE UP (ref 2–14)
NEUTROPHILS # BLD AUTO: 3.11 K/UL — SIGNIFICANT CHANGE UP (ref 1.8–7.4)
NEUTROPHILS NFR BLD AUTO: 56.8 % — SIGNIFICANT CHANGE UP (ref 43–77)
NITRITE UR-MCNC: NEGATIVE — SIGNIFICANT CHANGE UP
NRBC # BLD: 0 /100 WBCS — SIGNIFICANT CHANGE UP
NRBC # FLD: 0 K/UL — SIGNIFICANT CHANGE UP
PH UR: 7 — SIGNIFICANT CHANGE UP (ref 5–8)
PHENYTOIN FREE SERPL-MCNC: 22.6 UG/ML — HIGH (ref 10–20)
PLATELET # BLD AUTO: 271 K/UL — SIGNIFICANT CHANGE UP (ref 150–400)
POTASSIUM SERPL-MCNC: 4.5 MMOL/L — SIGNIFICANT CHANGE UP (ref 3.5–5.3)
POTASSIUM SERPL-SCNC: 4.5 MMOL/L — SIGNIFICANT CHANGE UP (ref 3.5–5.3)
PROT SERPL-MCNC: 7.4 G/DL — SIGNIFICANT CHANGE UP (ref 6–8.3)
PROT UR-MCNC: NEGATIVE — SIGNIFICANT CHANGE UP
RBC # BLD: 5.46 M/UL — SIGNIFICANT CHANGE UP (ref 4.2–5.8)
RBC # FLD: 15 % — HIGH (ref 10.3–14.5)
SODIUM SERPL-SCNC: 138 MMOL/L — SIGNIFICANT CHANGE UP (ref 135–145)
SP GR SPEC: 1.01 — SIGNIFICANT CHANGE UP (ref 1.01–1.02)
UROBILINOGEN FLD QL: SIGNIFICANT CHANGE UP
WBC # BLD: 5.48 K/UL — SIGNIFICANT CHANGE UP (ref 3.8–10.5)
WBC # FLD AUTO: 5.48 K/UL — SIGNIFICANT CHANGE UP (ref 3.8–10.5)

## 2021-08-11 PROCEDURE — 99282 EMERGENCY DEPT VISIT SF MDM: CPT

## 2021-08-11 NOTE — ED ADULT NURSE NOTE - NSIMPLEMENTINTERV_GEN_ALL_ED
Implemented All Universal Safety Interventions:  Wrights to call system. Call bell, personal items and telephone within reach. Instruct patient to call for assistance. Room bathroom lighting operational. Non-slip footwear when patient is off stretcher. Physically safe environment: no spills, clutter or unnecessary equipment. Stretcher in lowest position, wheels locked, appropriate side rails in place.

## 2021-08-11 NOTE — ED PROVIDER NOTE - CLINICAL SUMMARY MEDICAL DECISION MAKING FREE TEXT BOX
47 y/o male pmh seizures c/o shaking episode x1 day ago after not taking phenytoin x  1 day- will check labs, ekg, reassess

## 2021-08-11 NOTE — ED ADULT TRIAGE NOTE - CHIEF COMPLAINT QUOTE
p/t with hx seizures complaint with meds, c/o of seizure activity yesterday, denies any discomfort @ present, no neuro deficits noted

## 2021-08-11 NOTE — ED ADULT NURSE NOTE - OBJECTIVE STATEMENT
47 y/o male presents to ED complaining of seizures yesterday that lasted a "few seconds." Pt a&ox4, endorses being conscious during his seizure like activity. Pt states "I was shaking, I was laying down and it only lasted a few seconds." Pt also endorses taking dilantin daily but missing his dose yesterday because his pharmacy did not have it ready. Pt appears in no distress at this time. Respirations even and unlabored. VS as per flowsheets. 18g IV placed to RAC. Labs obtained as ordered. Will monitor.

## 2021-08-11 NOTE — ED PROVIDER NOTE - NS ED ATTENDING STATEMENT MOD
Radha is a 39 year old who is being evaluated via a billable telephone visit.      What phone number would you like to be contacted at? .  How would you like to obtain your AVS? Sherif    Assessment & Plan     Anxiety  Gave her titration schedule to wean off Prozac and start Effexor, return to clinic 3 weeks for medication check, sooner if new/worsening symptoms.   - venlafaxine (EFFEXOR-XR) 37.5 MG 24 hr capsule  Dispense: 30 capsule; Refill: 0  - FLUoxetine (PROZAC) 10 MG capsule  Dispense: 7 capsule; Refill: 0           Regular exercise  See Patient Instructions    Return in about 3 weeks (around 5/17/2021), or if symptoms worsen or fail to improve, for Follow up, using a video visit.    RICARDO Ceja [facility]    Dameon Garcia is a 39 year old who presents for the following health issues  HPI     Anxiety- worsening and she is not liking Prozac.  She realizes that the Effexor was working much better for her and wants to change back to it.      How many servings of fruits and vegetables do you eat daily?  2-3    On average, how many sweetened beverages do you drink each day (Examples: soda, juice, sweet tea, etc.  Do NOT count diet or artificially sweetened beverages)?   1    How many days per week do you exercise enough to make your heart beat faster? 3 or less    How many minutes a day do you exercise enough to make your heart beat faster? 10 - 19    How many days per week do you miss taking your medication? 0      Review of Systems   Constitutional, HEENT, cardiovascular, pulmonary, gi and gu systems are negative, except as otherwise noted.      Objective           Vitals:  No vitals were obtained today due to virtual visit.    Physical Exam   healthy, alert and no distress  PSYCH: Alert and oriented times 3; coherent speech, normal   rate and volume, able to articulate logical thoughts, able   to abstract reason, no tangential thoughts, no hallucinations   or  delusions  Her affect is normal and pleasant  RESP: No cough, no audible wheezing, able to talk in full sentences  Remainder of exam unable to be completed due to telephone visits        Phone call duration: 8 minutes     Attending with

## 2021-08-11 NOTE — ED PROVIDER NOTE - OBJECTIVE STATEMENT
47 y/o male pmh seizures c/o shaking episode x 1 day ago. Pt admits running out of his phenytoin x 1 week ago. Pt was seen in the ER and given a dose. Pt f/u with his neuro who wrote a script for him. Pt went to fill it x3 days ago but the pharmacy had to order it. Pt went to pick it up yesterday but while waiting, developed generalized shakiness which resolved on its own. Pt states that he never actually seized and remembers the entire episode. Pt took his meds yesterday and today. pt was sent in by his neuro to get a phenytoin level.

## 2021-08-11 NOTE — ED PROVIDER NOTE - PATIENT PORTAL LINK FT
You can access the FollowMyHealth Patient Portal offered by [facility] by registering at the following website: [email or web address]. By joining [employer]’s FollowMyHealth portal, you will also be able to view your health information using other applications (apps) compatible with our system.

## 2021-08-11 NOTE — ED PROVIDER NOTE - ATTENDING CONTRIBUTION TO CARE
46M w h/o seizures presents after an episode of RUE tremors yesterday. States he has this shaking before every seizure he has but he did not have one yesterday. Denies LOC and states he remembers the entire event. States he feels at baseline now. Was having difficulty filling his prescription but was able to pick it up today and took a dose this morning. Was sent in by neurologist for phenytoin level check. Denies f/c, chest pain, sob.     Constitutional:  (-) fever, (-) chills, (-) lethargy  Eyes:  (-) eye pain (-) visual changes  ENMT: (-) nasal discharge, (-) sore throat. (-) neck pain or stiffness  Cardiac: (-) chest pain (-) palpitations  Respiratory:  (-) cough (-) respiratory distress.   GI:  (-) nausea (-) vomiting (-) diarrhea (-) abdominal pain.  :  (-) dysuria (-) frequency (-) burning.  MS:  (-) back pain (-) joint pain.  Neuro:  (-) headache (-) numbness (-) tingling (-) focal weakness, + shaking  Skin:  (-) rash    CONSTITUTIONAL: NAD, awake, alert  HEAD: Normocephalic; atraumatic  ENMT: External appears normal, MMM  NECK: no tenderness, FROM  CARD: Normal Sl, S2; no audible murmurs  RESP: normal wob, lungs ctab  ABD: soft, non-distended; non-tender  EXT: no edema, normal ROM in all four extremities  SKIN: Warm, dry, no rashes  NEURO: aaox3, moving all extremities spontaneously, 5/5 strength throughout, no droop or drift      46M w h/o seizures presents after an episode of RUE tremors yesterday, likely his aura for seizures, no loc or generalized seizures, started his medication this morning, will check level and reassess    signed out pending discussion with outpatient neurologist

## 2021-08-25 ENCOUNTER — EMERGENCY (EMERGENCY)
Facility: HOSPITAL | Age: 46
LOS: 1 days | Discharge: HOME | End: 2021-08-25
Attending: EMERGENCY MEDICINE | Admitting: EMERGENCY MEDICINE
Payer: COMMERCIAL

## 2021-08-25 VITALS
DIASTOLIC BLOOD PRESSURE: 94 MMHG | TEMPERATURE: 98 F | RESPIRATION RATE: 16 BRPM | OXYGEN SATURATION: 100 % | HEIGHT: 68 IN | SYSTOLIC BLOOD PRESSURE: 187 MMHG | HEART RATE: 67 BPM

## 2021-08-25 VITALS
OXYGEN SATURATION: 100 % | DIASTOLIC BLOOD PRESSURE: 96 MMHG | HEART RATE: 66 BPM | RESPIRATION RATE: 16 BRPM | SYSTOLIC BLOOD PRESSURE: 165 MMHG | TEMPERATURE: 99 F

## 2021-08-25 LAB
ALBUMIN SERPL ELPH-MCNC: 4.6 G/DL — SIGNIFICANT CHANGE UP (ref 3.3–5)
ALP SERPL-CCNC: 123 U/L — HIGH (ref 40–120)
ALT FLD-CCNC: 22 U/L — SIGNIFICANT CHANGE UP (ref 4–41)
ANION GAP SERPL CALC-SCNC: 11 MMOL/L — SIGNIFICANT CHANGE UP (ref 7–14)
ANION GAP SERPL CALC-SCNC: 13 MMOL/L — SIGNIFICANT CHANGE UP (ref 7–14)
APPEARANCE UR: CLEAR — SIGNIFICANT CHANGE UP
AST SERPL-CCNC: 23 U/L — SIGNIFICANT CHANGE UP (ref 4–40)
BASOPHILS # BLD AUTO: 0.04 K/UL — SIGNIFICANT CHANGE UP (ref 0–0.2)
BASOPHILS NFR BLD AUTO: 0.3 % — SIGNIFICANT CHANGE UP (ref 0–2)
BILIRUB SERPL-MCNC: <0.2 MG/DL — SIGNIFICANT CHANGE UP (ref 0.2–1.2)
BILIRUB UR-MCNC: NEGATIVE — SIGNIFICANT CHANGE UP
BUN SERPL-MCNC: 12 MG/DL — SIGNIFICANT CHANGE UP (ref 7–23)
BUN SERPL-MCNC: 14 MG/DL — SIGNIFICANT CHANGE UP (ref 7–23)
CALCIUM SERPL-MCNC: 8.4 MG/DL — SIGNIFICANT CHANGE UP (ref 8.4–10.5)
CALCIUM SERPL-MCNC: 9 MG/DL — SIGNIFICANT CHANGE UP (ref 8.4–10.5)
CHLORIDE SERPL-SCNC: 102 MMOL/L — SIGNIFICANT CHANGE UP (ref 98–107)
CHLORIDE SERPL-SCNC: 104 MMOL/L — SIGNIFICANT CHANGE UP (ref 98–107)
CO2 SERPL-SCNC: 23 MMOL/L — SIGNIFICANT CHANGE UP (ref 22–31)
CO2 SERPL-SCNC: 23 MMOL/L — SIGNIFICANT CHANGE UP (ref 22–31)
COLOR SPEC: COLORLESS — SIGNIFICANT CHANGE UP
CREAT SERPL-MCNC: 1.41 MG/DL — HIGH (ref 0.5–1.3)
CREAT SERPL-MCNC: 1.48 MG/DL — HIGH (ref 0.5–1.3)
DIFF PNL FLD: NEGATIVE — SIGNIFICANT CHANGE UP
EOSINOPHIL # BLD AUTO: 0 K/UL — SIGNIFICANT CHANGE UP (ref 0–0.5)
EOSINOPHIL NFR BLD AUTO: 0 % — SIGNIFICANT CHANGE UP (ref 0–6)
GLUCOSE SERPL-MCNC: 116 MG/DL — HIGH (ref 70–99)
GLUCOSE SERPL-MCNC: 96 MG/DL — SIGNIFICANT CHANGE UP (ref 70–99)
GLUCOSE UR QL: NEGATIVE — SIGNIFICANT CHANGE UP
HCT VFR BLD CALC: 43.2 % — SIGNIFICANT CHANGE UP (ref 39–50)
HGB BLD-MCNC: 13 G/DL — SIGNIFICANT CHANGE UP (ref 13–17)
IANC: 13.45 K/UL — HIGH (ref 1.5–8.5)
IMM GRANULOCYTES NFR BLD AUTO: 0.3 % — SIGNIFICANT CHANGE UP (ref 0–1.5)
KETONES UR-MCNC: NEGATIVE — SIGNIFICANT CHANGE UP
LEUKOCYTE ESTERASE UR-ACNC: NEGATIVE — SIGNIFICANT CHANGE UP
LYMPHOCYTES # BLD AUTO: 0.8 K/UL — LOW (ref 1–3.3)
LYMPHOCYTES # BLD AUTO: 5.3 % — LOW (ref 13–44)
MCHC RBC-ENTMCNC: 22.7 PG — LOW (ref 27–34)
MCHC RBC-ENTMCNC: 30.1 GM/DL — LOW (ref 32–36)
MCV RBC AUTO: 75.4 FL — LOW (ref 80–100)
MONOCYTES # BLD AUTO: 0.64 K/UL — SIGNIFICANT CHANGE UP (ref 0–0.9)
MONOCYTES NFR BLD AUTO: 4.3 % — SIGNIFICANT CHANGE UP (ref 2–14)
NEUTROPHILS # BLD AUTO: 13.45 K/UL — HIGH (ref 1.8–7.4)
NEUTROPHILS NFR BLD AUTO: 89.8 % — HIGH (ref 43–77)
NITRITE UR-MCNC: NEGATIVE — SIGNIFICANT CHANGE UP
NRBC # BLD: 0 /100 WBCS — SIGNIFICANT CHANGE UP
NRBC # FLD: 0 K/UL — SIGNIFICANT CHANGE UP
PH UR: 8.5 — HIGH (ref 5–8)
PLATELET # BLD AUTO: 280 K/UL — SIGNIFICANT CHANGE UP (ref 150–400)
POTASSIUM SERPL-MCNC: 3.8 MMOL/L — SIGNIFICANT CHANGE UP (ref 3.5–5.3)
POTASSIUM SERPL-MCNC: 3.9 MMOL/L — SIGNIFICANT CHANGE UP (ref 3.5–5.3)
POTASSIUM SERPL-SCNC: 3.8 MMOL/L — SIGNIFICANT CHANGE UP (ref 3.5–5.3)
POTASSIUM SERPL-SCNC: 3.9 MMOL/L — SIGNIFICANT CHANGE UP (ref 3.5–5.3)
PROT SERPL-MCNC: 8.1 G/DL — SIGNIFICANT CHANGE UP (ref 6–8.3)
PROT UR-MCNC: NEGATIVE — SIGNIFICANT CHANGE UP
RBC # BLD: 5.73 M/UL — SIGNIFICANT CHANGE UP (ref 4.2–5.8)
RBC # FLD: 15.2 % — HIGH (ref 10.3–14.5)
SODIUM SERPL-SCNC: 138 MMOL/L — SIGNIFICANT CHANGE UP (ref 135–145)
SODIUM SERPL-SCNC: 138 MMOL/L — SIGNIFICANT CHANGE UP (ref 135–145)
SP GR SPEC: 1.01 — SIGNIFICANT CHANGE UP (ref 1.01–1.02)
UROBILINOGEN FLD QL: SIGNIFICANT CHANGE UP
WBC # BLD: 14.98 K/UL — HIGH (ref 3.8–10.5)
WBC # FLD AUTO: 14.98 K/UL — HIGH (ref 3.8–10.5)

## 2021-08-25 PROCEDURE — 93010 ELECTROCARDIOGRAM REPORT: CPT

## 2021-08-25 PROCEDURE — 74177 CT ABD & PELVIS W/CONTRAST: CPT | Mod: 26

## 2021-08-25 PROCEDURE — 99285 EMERGENCY DEPT VISIT HI MDM: CPT | Mod: 25

## 2021-08-25 RX ORDER — SODIUM CHLORIDE 9 MG/ML
1000 INJECTION INTRAMUSCULAR; INTRAVENOUS; SUBCUTANEOUS ONCE
Refills: 0 | Status: COMPLETED | OUTPATIENT
Start: 2021-08-25 | End: 2021-08-25

## 2021-08-25 RX ORDER — KETOROLAC TROMETHAMINE 30 MG/ML
15 SYRINGE (ML) INJECTION ONCE
Refills: 0 | Status: DISCONTINUED | OUTPATIENT
Start: 2021-08-25 | End: 2021-08-25

## 2021-08-25 RX ORDER — ACETAMINOPHEN 500 MG
650 TABLET ORAL ONCE
Refills: 0 | Status: COMPLETED | OUTPATIENT
Start: 2021-08-25 | End: 2021-08-25

## 2021-08-25 RX ADMIN — SODIUM CHLORIDE 1000 MILLILITER(S): 9 INJECTION INTRAMUSCULAR; INTRAVENOUS; SUBCUTANEOUS at 12:54

## 2021-08-25 RX ADMIN — Medication 650 MILLIGRAM(S): at 09:07

## 2021-08-25 RX ADMIN — Medication 15 MILLIGRAM(S): at 09:06

## 2021-08-25 RX ADMIN — Medication 200 MILLIGRAM(S): at 12:54

## 2021-08-25 RX ADMIN — SODIUM CHLORIDE 1000 MILLILITER(S): 9 INJECTION INTRAMUSCULAR; INTRAVENOUS; SUBCUTANEOUS at 09:07

## 2021-08-25 NOTE — ED ADULT NURSE NOTE - OBJECTIVE STATEMENT
Pt received to rm 2, A&OX4, ambulatory. Pt states he began having R sided flank pain with radiation to his back this morning and it has gotten progressively worse. Reports burning with urination. Endorsing one episode of vomiting.  Denies hematuria. Denies hx of kidney stones. Denies CP, SOB, fevers, chills, diarrhea. Pt appears comfortable. Respirations even and unlabored. Awaiting plan at this time.

## 2021-08-25 NOTE — ED PROVIDER NOTE - CLINICAL SUMMARY MEDICAL DECISION MAKING FREE TEXT BOX
45yo M w/ PMHx of epilepsy and HTN reporting to the ED with abdominal pain. 47yo M w/ PMHx of epilepsy and HTN reporting to the ED with abdominal pain. Pt has R flank pain x1 day, radiating to RLQ and one episode of burning with urination. Plan for labs, urine, CTAP to r/o kidney stones vs appy. 47yo M w/ PMHx of epilepsy and HTN reporting to the ED with abdominal pain. Pt has R flank pain x1 day, radiating to RLQ and one episode of burning with urination. Plan for labs, urine, CTAP to r/o kidney stones vs appy. Staffed with supervising physician, Dr. Tiwari; medical record reviewed. Upon arrival to ED, patient was promptly evaluated. TTP noted in R flank. Groin exam reassuring, no hernia present. UA w/o any evidence of UTI. CT w/ evidence of 3mm stone at UVJ. CBC w/ evidence of leukocytosis, no evidence of UTI. CMP w/ evidence of elevated Cr of 1.48, repeat of 1.41. Patient advised of results after given pain control, toradol, 1L fluids. Patient was pain free. We discussed evolving symptoms and the need for prompt evaluation by PCP and possible referral to urology. Given pain free, we discussed ibuprofen prn and tylenol. Patient understanding and agreeable to this. All questions answered, patient discharged in stable condition.

## 2021-08-25 NOTE — ED PROVIDER NOTE - NSFOLLOWUPINSTRUCTIONS_ED_ALL_ED_FT
As we discussed, please follow up with PCP in 3-5 days, report back to ED with any worsening pain, inability to eat or drink, or fever. Please use ibuprofen or tylenol for pain control.

## 2021-08-25 NOTE — ED PROVIDER NOTE - EKG ADDITIONAL INFORMATION FREE TEXT
Obtained for the indication of SOB: 1053  Rate: 61  Rhythm: NSR  Intervals: MS: 202  QRS: 106  QTC: 412  Interpretation: NSR, no evidence of JORDAN, STD, TWI present in aVR and III, aVF; no previous EcG to compare

## 2021-08-25 NOTE — ED PROVIDER NOTE - OBJECTIVE STATEMENT
47yo M w/ PMHx of epilepsy and HTN reporting to the ED with abdominal pain. Patient reports a 3-4 hour history of right flank pain. He reports no history of nephrolithiasis. He reports the pain as sharp/constant. Reports the pain as non-progressive. Denies any urinary discharge or rash. Denies any radiation of pain to groin. Reports intermittent chest pain with this. He denies any shortness of breath. He denies any history of PE/DVT. He denies any history of ACS or immobilization. He denies any history of premature cardiac disease/death in family.

## 2021-08-25 NOTE — ED PROVIDER NOTE - PATIENT PORTAL LINK FT
You can access the FollowMyHealth Patient Portal offered by [facility] by registering at the following website: [email or web address]. By joining [employer]’s FollowMyHealth portal, you will also be able to view your health information using other applications (apps) compatible with our system.

## 2021-08-25 NOTE — ED PROVIDER NOTE - PROGRESS NOTE DETAILS
Pt Cr elevated in the setting of kidney stone. Pt would like to follow up as outpatient with urology. Results reviewed and discussed with the patient. Patient verbalized understanding as well as outpatient treatment and follow up plan, including the importance of timely outpatient follow up. The patient was given verbal and written discharge instructions, including instructions when to return to the ED and when to seek follow-up from their pcp. Any specialty follow-up was discussed, including how to make a appointment.  Instructions were discussed in simple, plain language and understanding verbalized by the patient. The patient understands that should their symptoms worsen or any new symptoms arise they should return to the ED immediately for further evaluation. All patient's questions were answered. Patient verbalizes understanding and agreement with outpatient treatment and follow up plan. Patient is medically cleared for discharge at this time. Pt jenni out to me, Dr. Multani, by Dr. Tiwari. Repeat BMP for elevated Cr. Pending CT read for possible kidney stone.

## 2021-08-25 NOTE — ED PROVIDER NOTE - ATTENDING CONTRIBUTION TO CARE
45yo M w/ PMHx of epilepsy and HTN reporting to the ED with abdominal pain. Pt has R flank pain x1 day, radiating to RLQ and one episode of burning with urination. Plan for labs, urine, CTAP to r/o kidney stones vs appy. +R CVAT and diffuse abd pain, no r/g.

## 2021-08-26 LAB
CULTURE RESULTS: SIGNIFICANT CHANGE UP
SPECIMEN SOURCE: SIGNIFICANT CHANGE UP

## 2021-09-27 ENCOUNTER — EMERGENCY (EMERGENCY)
Facility: HOSPITAL | Age: 46
LOS: 1 days | Discharge: HOME | End: 2021-09-27
Attending: EMERGENCY MEDICINE | Admitting: EMERGENCY MEDICINE
Payer: COMMERCIAL

## 2021-09-27 VITALS
DIASTOLIC BLOOD PRESSURE: 98 MMHG | SYSTOLIC BLOOD PRESSURE: 157 MMHG | RESPIRATION RATE: 16 BRPM | OXYGEN SATURATION: 100 % | HEART RATE: 61 BPM

## 2021-09-27 VITALS
RESPIRATION RATE: 16 BRPM | OXYGEN SATURATION: 100 % | HEART RATE: 80 BPM | HEIGHT: 68 IN | DIASTOLIC BLOOD PRESSURE: 108 MMHG | SYSTOLIC BLOOD PRESSURE: 182 MMHG | TEMPERATURE: 98 F

## 2021-09-27 LAB
ALBUMIN SERPL ELPH-MCNC: 4.4 G/DL — SIGNIFICANT CHANGE UP (ref 3.3–5)
ALP SERPL-CCNC: 131 U/L — HIGH (ref 40–120)
ALT FLD-CCNC: 18 U/L — SIGNIFICANT CHANGE UP (ref 4–41)
ANION GAP SERPL CALC-SCNC: 9 MMOL/L — SIGNIFICANT CHANGE UP (ref 7–14)
APPEARANCE UR: CLEAR — SIGNIFICANT CHANGE UP
AST SERPL-CCNC: 23 U/L — SIGNIFICANT CHANGE UP (ref 4–40)
BASOPHILS # BLD AUTO: 0.02 K/UL — SIGNIFICANT CHANGE UP (ref 0–0.2)
BASOPHILS NFR BLD AUTO: 0.4 % — SIGNIFICANT CHANGE UP (ref 0–2)
BILIRUB SERPL-MCNC: <0.2 MG/DL — SIGNIFICANT CHANGE UP (ref 0.2–1.2)
BILIRUB UR-MCNC: NEGATIVE — SIGNIFICANT CHANGE UP
BUN SERPL-MCNC: 9 MG/DL — SIGNIFICANT CHANGE UP (ref 7–23)
CALCIUM SERPL-MCNC: 9.2 MG/DL — SIGNIFICANT CHANGE UP (ref 8.4–10.5)
CHLORIDE SERPL-SCNC: 104 MMOL/L — SIGNIFICANT CHANGE UP (ref 98–107)
CO2 SERPL-SCNC: 28 MMOL/L — SIGNIFICANT CHANGE UP (ref 22–31)
COLOR SPEC: SIGNIFICANT CHANGE UP
CREAT SERPL-MCNC: 1.15 MG/DL — SIGNIFICANT CHANGE UP (ref 0.5–1.3)
DIFF PNL FLD: NEGATIVE — SIGNIFICANT CHANGE UP
EOSINOPHIL # BLD AUTO: 0.21 K/UL — SIGNIFICANT CHANGE UP (ref 0–0.5)
EOSINOPHIL NFR BLD AUTO: 4.1 % — SIGNIFICANT CHANGE UP (ref 0–6)
GLUCOSE SERPL-MCNC: 99 MG/DL — SIGNIFICANT CHANGE UP (ref 70–99)
GLUCOSE UR QL: NEGATIVE — SIGNIFICANT CHANGE UP
HCT VFR BLD CALC: 42.7 % — SIGNIFICANT CHANGE UP (ref 39–50)
HGB BLD-MCNC: 13.1 G/DL — SIGNIFICANT CHANGE UP (ref 13–17)
IANC: 3 K/UL — SIGNIFICANT CHANGE UP (ref 1.5–8.5)
IMM GRANULOCYTES NFR BLD AUTO: 0.2 % — SIGNIFICANT CHANGE UP (ref 0–1.5)
KETONES UR-MCNC: NEGATIVE — SIGNIFICANT CHANGE UP
LEUKOCYTE ESTERASE UR-ACNC: NEGATIVE — SIGNIFICANT CHANGE UP
LYMPHOCYTES # BLD AUTO: 1.37 K/UL — SIGNIFICANT CHANGE UP (ref 1–3.3)
LYMPHOCYTES # BLD AUTO: 26.7 % — SIGNIFICANT CHANGE UP (ref 13–44)
MCHC RBC-ENTMCNC: 23 PG — LOW (ref 27–34)
MCHC RBC-ENTMCNC: 30.7 GM/DL — LOW (ref 32–36)
MCV RBC AUTO: 74.9 FL — LOW (ref 80–100)
MONOCYTES # BLD AUTO: 0.52 K/UL — SIGNIFICANT CHANGE UP (ref 0–0.9)
MONOCYTES NFR BLD AUTO: 10.1 % — SIGNIFICANT CHANGE UP (ref 2–14)
NEUTROPHILS # BLD AUTO: 3 K/UL — SIGNIFICANT CHANGE UP (ref 1.8–7.4)
NEUTROPHILS NFR BLD AUTO: 58.5 % — SIGNIFICANT CHANGE UP (ref 43–77)
NITRITE UR-MCNC: NEGATIVE — SIGNIFICANT CHANGE UP
NRBC # BLD: 0 /100 WBCS — SIGNIFICANT CHANGE UP
NRBC # FLD: 0 K/UL — SIGNIFICANT CHANGE UP
PH UR: 7.5 — SIGNIFICANT CHANGE UP (ref 5–8)
PLATELET # BLD AUTO: 250 K/UL — SIGNIFICANT CHANGE UP (ref 150–400)
POTASSIUM SERPL-MCNC: 4.3 MMOL/L — SIGNIFICANT CHANGE UP (ref 3.5–5.3)
POTASSIUM SERPL-SCNC: 4.3 MMOL/L — SIGNIFICANT CHANGE UP (ref 3.5–5.3)
PROT SERPL-MCNC: 7.5 G/DL — SIGNIFICANT CHANGE UP (ref 6–8.3)
PROT UR-MCNC: NEGATIVE — SIGNIFICANT CHANGE UP
RBC # BLD: 5.7 M/UL — SIGNIFICANT CHANGE UP (ref 4.2–5.8)
RBC # FLD: 14.8 % — HIGH (ref 10.3–14.5)
SARS-COV-2 RNA SPEC QL NAA+PROBE: SIGNIFICANT CHANGE UP
SODIUM SERPL-SCNC: 141 MMOL/L — SIGNIFICANT CHANGE UP (ref 135–145)
SP GR SPEC: 1.02 — SIGNIFICANT CHANGE UP (ref 1–1.05)
TROPONIN T, HIGH SENSITIVITY RESULT: <6 NG/L — SIGNIFICANT CHANGE UP
UROBILINOGEN FLD QL: SIGNIFICANT CHANGE UP
WBC # BLD: 5.13 K/UL — SIGNIFICANT CHANGE UP (ref 3.8–10.5)
WBC # FLD AUTO: 5.13 K/UL — SIGNIFICANT CHANGE UP (ref 3.8–10.5)

## 2021-09-27 PROCEDURE — 99285 EMERGENCY DEPT VISIT HI MDM: CPT | Mod: 25

## 2021-09-27 PROCEDURE — 71275 CT ANGIOGRAPHY CHEST: CPT | Mod: 26

## 2021-09-27 PROCEDURE — 70450 CT HEAD/BRAIN W/O DYE: CPT | Mod: 26

## 2021-09-27 PROCEDURE — 93010 ELECTROCARDIOGRAM REPORT: CPT

## 2021-09-27 PROCEDURE — 74174 CTA ABD&PLVS W/CONTRAST: CPT | Mod: 26

## 2021-09-27 RX ORDER — AMLODIPINE BESYLATE 2.5 MG/1
1 TABLET ORAL
Qty: 30 | Refills: 0
Start: 2021-09-27 | End: 2021-10-26

## 2021-09-27 RX ORDER — ACETAMINOPHEN 500 MG
650 TABLET ORAL ONCE
Refills: 0 | Status: COMPLETED | OUTPATIENT
Start: 2021-09-27 | End: 2021-09-27

## 2021-09-27 RX ORDER — HYDRALAZINE HCL 50 MG
25 TABLET ORAL ONCE
Refills: 0 | Status: COMPLETED | OUTPATIENT
Start: 2021-09-27 | End: 2021-09-27

## 2021-09-27 RX ADMIN — Medication 650 MILLIGRAM(S): at 10:25

## 2021-09-27 RX ADMIN — Medication 25 MILLIGRAM(S): at 10:25

## 2021-09-27 NOTE — ED PROVIDER NOTE - NSFOLLOWUPINSTRUCTIONS_ED_ALL_ED_FT
Rest, drink plenty of fluids. Norvasc which is an additional blood pressure med  Advance activity as tolerated.  Continue all previously prescribed medications as directed.  Follow up with your primary care physician in 48-72 hours- bring copies of your results.  Return to the ER for worsening or persistent symptoms, and/or ANY NEW OR CONCERNING SYMPTOMS. If you have issues obtaining follow up, please call: [phone] to obtain a doctor or specialist who takes your insurance in your area.  You may call [phone] to make an appointment with the internal medicine clinic. Rest, drink plenty of fluids. Follow up with your primary doctor as scheduled on 10/7/21. Norvasc which is an additional blood pressure medication was sent to your pharmacy. Take this medication in addition to your normal Blood Pressure Medication. You have abnormal findings of your Parotid gland on CT. Follow up with an ENT for further evaluation, the ER will schedule you for follow up. Advance activity as tolerated.  Continue all previously prescribed medications as directed.  Follow up with your primary care physician in 48-72 hours- bring copies of your results.  Return to the ER for worsening or persistent symptoms, and/or ANY NEW OR CONCERNING SYMPTOMS. If you have issues obtaining follow up, please call: [phone] to obtain a doctor or specialist who takes your insurance in your area.  You may call [phone] to make an appointment with the internal medicine clinic.

## 2021-09-27 NOTE — ED ADULT NURSE NOTE - OBJECTIVE STATEMENT
Pt presenting to room 19 AxOx4 ambulatory at baseline with c/o "pressure" to bilateral legs and elevated BP. PMH HTN, seizures- on Dilantin. On arrival to ED pt's breathing is even and unlabored. Palor/diaphoresis not noted. BLE presenting without redness or swelling. Pt denies CP, SOB, N/V, fever, cough. IV established with 20g in RAC. Labs drawn and sent. Pt NSR on cardiac monitor. MD at bedside, will continue to monitor.

## 2021-09-27 NOTE — ED PROVIDER NOTE - PATIENT PORTAL LINK FT
You can access the FollowMyHealth Patient Portal offered by [facility] by registering at the following website: [email or web address]. By joining [employer]’s FollowMyHealth portal, you will also be able to view your health information using other applications (apps) compatible with our system.

## 2021-09-27 NOTE — ED PROVIDER NOTE - ATTENDING CONTRIBUTION TO CARE
Patient is a 45 yo M with history of HTN, seizure d/o here for evaluation of headache, chest pain and back pain. He states he feels a pressure in his head and entire body. He denies swelling in his legs but states he feels a pressure going up to his head. He was worried about blood clots. No travel. No chest pain or shortness of breath. No swelling in legs or focal pain in legs. No fevers, chills. He states he took his blood pressure medication. He is unable to state what his medication is. He also states he has not followed up with his pmd, has an appointment in early October for a new pmd. He states he tried ibuprofen without any relief at home.     VS noted  Gen. no acute distress, Non toxic   HEENT: EOMI, mmm  Lungs: CTAB/L no C/ W /R   CVS: RRR, + 2 radial pulse bilaterally, + 2 DP pulse bilaterally  Abd; Soft non tender, non distended   Ext: no edema, no calf tenderness  Skin: no rash  Neuro AAOx3 non focal clear speech  a/p: chest pain, back pain, headache in setting of high blood pressure. Patient is a poor historian. Plan for labs, CT Head, CTA chest/AP.   - Justin VINSON

## 2021-09-27 NOTE — ED ADULT TRIAGE NOTE - CHIEF COMPLAINT QUOTE
pt c/o high blood pressure x 4 months. pt states "I also think I have blood clots in both of my legs bc they feel hot and I am sweaty for 4 months". pt denies chest pain, SOB. pt denies headache. hx of HTN, seizures (on dilantin).

## 2021-09-27 NOTE — ED PROVIDER NOTE - CLINICAL SUMMARY MEDICAL DECISION MAKING FREE TEXT BOX
47 Y/O M PMH HTN, seizure disorder presents with multiple complaints. Pt states that he has been having back pain since Friday, states that he has it previously, states the pain is 8/10. Pt states he feels tight throughout his entire body including his legs. Pt is neurologically intact, notes 4/10 HA. Plan is Hydralazine for BP, Ct head to eval for mass, ICH or other acute findings and CTA to eval for dissection. Will check an EKG to eval for atypical ACS and will check labs to eval for anemia or electrolyte disturbance. Pt is well appearing, no acute distress.

## 2021-09-27 NOTE — ED ADULT NURSE REASSESSMENT NOTE - NS ED NURSE REASSESS COMMENT FT1
pt at baseline mental status resting comfortably with RR even and unlabored. Palor/diaphoresis not noted. Pt denies CP, SOB at this time. Awaiting dispo status. Will continue to monitor.

## 2021-09-27 NOTE — ED PROVIDER NOTE - OBJECTIVE STATEMENT
47 Y/O M PMH HTN 47 Y/O M PMH HTN, seizure disorder presents with multiple complaints. Pt states that he 47 Y/O M PMH HTN, seizure disorder presents with multiple complaints. Pt states that he has been having back pain since Friday, states that he has it previously, states the pain is 8/10. Pt states he feels tight throughout his entire body including his legs. Pt states that he has had intermittent headaches which are 4/10. Pt states he has been compliant with his medications (takes a BP medication and anti-epileptic, does not recall name of BP medication) but states he has not seen his PMD since his his last ED visits. Pt denies blurry vision, denies any other sx or acute complaints.

## 2021-09-28 LAB
CULTURE RESULTS: NO GROWTH — SIGNIFICANT CHANGE UP
SPECIMEN SOURCE: SIGNIFICANT CHANGE UP

## 2021-09-30 ENCOUNTER — APPOINTMENT (OUTPATIENT)
Dept: OTOLARYNGOLOGY | Facility: CLINIC | Age: 46
End: 2021-09-30
Payer: COMMERCIAL

## 2021-09-30 DIAGNOSIS — R07.0 PAIN IN THROAT: ICD-10-CM

## 2021-09-30 DIAGNOSIS — K21.9 GASTRO-ESOPHAGEAL REFLUX DISEASE W/OUT ESOPHAGITIS: ICD-10-CM

## 2021-09-30 PROCEDURE — 31575 DIAGNOSTIC LARYNGOSCOPY: CPT

## 2021-09-30 PROCEDURE — 99214 OFFICE O/P EST MOD 30 MIN: CPT | Mod: 25

## 2021-09-30 RX ORDER — AMLODIPINE BESYLATE 5 MG/1
TABLET ORAL
Refills: 0 | Status: ACTIVE | COMMUNITY

## 2021-09-30 RX ORDER — FAMOTIDINE 40 MG/1
40 TABLET, FILM COATED ORAL
Qty: 60 | Refills: 1 | Status: COMPLETED | COMMUNITY
Start: 2021-07-06 | End: 2021-09-30

## 2021-10-05 NOTE — PHYSICAL EXAM
[Normal] : no neck adenopathy [FreeTextEntry1] : Possible aphasia [de-identified] : No blood seen [de-identified] : Poor dentition [de-identified] : Poor gums

## 2021-10-05 NOTE — PROCEDURE
[de-identified] : Fiberoptic Laryngoscopy (75424)\par \par Procedure performed: Fiberoptic Laryngeal Endoscopy - Diagnostic\par Pre-op/post op indication: Odynophagia\par Patient was unable to cooperate with mirror. After informed verbal consent is obtained, the fiberoptic nasal endoscope # []  is passed via the [ right ][ left ] [ both] nasal cavity. \par Findings: base of tongue and vallecula clear, hypopharynx normal without pooled secretions, crisp epiglottis, no evidence of laryngomalacia, bilateral vocal fold mobility full and symmetric. There was  significant arytenoids edema and  erythema seen , without  mass or lesions..\par

## 2021-10-05 NOTE — HISTORY OF PRESENT ILLNESS
[de-identified] : 46 year old male presents for a subsequent visit for rhinitis.  States still  has daily sore throat pain, especially in the morning,  when rinsing mouth sees light red blood, left ear tinnitus, otalgia and has fluid inside, headaches and dizziness.  Denies choking when eating or drinking, and  odynophagia.  Taking omeprazole in mornings. Went to the ER on Monday due to elevated blood pressure was given Norvasc.  At that time also went to CT scanning of the head and angio chest to rule out a dissecting aneurysm.  The only positive finding on the scan was some slight  of enhancement in the part of the right parotid gland no other lesions were noted.\par Patient’s blood pressure was 200/104 on exam. Pt was advised to follow up with PCP for high blood pressure. \par  \par \par

## 2021-10-26 ENCOUNTER — EMERGENCY (EMERGENCY)
Facility: HOSPITAL | Age: 46
LOS: 0 days | Discharge: HOME | End: 2021-10-26
Attending: EMERGENCY MEDICINE
Payer: COMMERCIAL

## 2021-10-26 VITALS
HEART RATE: 73 BPM | HEIGHT: 68 IN | DIASTOLIC BLOOD PRESSURE: 120 MMHG | TEMPERATURE: 97 F | RESPIRATION RATE: 18 BRPM | SYSTOLIC BLOOD PRESSURE: 191 MMHG | OXYGEN SATURATION: 99 % | WEIGHT: 182.98 LBS

## 2021-10-26 VITALS
RESPIRATION RATE: 18 BRPM | OXYGEN SATURATION: 99 % | TEMPERATURE: 98 F | HEART RATE: 70 BPM | DIASTOLIC BLOOD PRESSURE: 96 MMHG | SYSTOLIC BLOOD PRESSURE: 154 MMHG

## 2021-10-26 DIAGNOSIS — R01.1 CARDIAC MURMUR, UNSPECIFIED: ICD-10-CM

## 2021-10-26 DIAGNOSIS — G40.909 EPILEPSY, UNSPECIFIED, NOT INTRACTABLE, WITHOUT STATUS EPILEPTICUS: ICD-10-CM

## 2021-10-26 DIAGNOSIS — I10 ESSENTIAL (PRIMARY) HYPERTENSION: ICD-10-CM

## 2021-10-26 DIAGNOSIS — R07.9 CHEST PAIN, UNSPECIFIED: ICD-10-CM

## 2021-10-26 LAB
ALBUMIN SERPL ELPH-MCNC: 3.3 G/DL — SIGNIFICANT CHANGE UP (ref 3.3–5)
ALP SERPL-CCNC: 126 U/L — HIGH (ref 40–120)
ALT FLD-CCNC: 37 U/L — SIGNIFICANT CHANGE UP (ref 12–78)
ANION GAP SERPL CALC-SCNC: 6 MMOL/L — SIGNIFICANT CHANGE UP (ref 5–17)
APPEARANCE UR: CLEAR — SIGNIFICANT CHANGE UP
APPEARANCE UR: CLEAR — SIGNIFICANT CHANGE UP
AST SERPL-CCNC: 32 U/L — SIGNIFICANT CHANGE UP (ref 15–37)
BASOPHILS # BLD AUTO: 0.03 K/UL — SIGNIFICANT CHANGE UP (ref 0–0.2)
BASOPHILS NFR BLD AUTO: 0.5 % — SIGNIFICANT CHANGE UP (ref 0–2)
BILIRUB SERPL-MCNC: 0.2 MG/DL — SIGNIFICANT CHANGE UP (ref 0.2–1.2)
BILIRUB UR-MCNC: NEGATIVE — SIGNIFICANT CHANGE UP
BILIRUB UR-MCNC: NEGATIVE — SIGNIFICANT CHANGE UP
BUN SERPL-MCNC: 13 MG/DL — SIGNIFICANT CHANGE UP (ref 7–23)
CALCIUM SERPL-MCNC: 8.9 MG/DL — SIGNIFICANT CHANGE UP (ref 8.5–10.1)
CHLORIDE SERPL-SCNC: 101 MMOL/L — SIGNIFICANT CHANGE UP (ref 96–108)
CO2 SERPL-SCNC: 30 MMOL/L — SIGNIFICANT CHANGE UP (ref 22–31)
COLOR SPEC: YELLOW — SIGNIFICANT CHANGE UP
COLOR SPEC: YELLOW — SIGNIFICANT CHANGE UP
CREAT SERPL-MCNC: 1.09 MG/DL — SIGNIFICANT CHANGE UP (ref 0.5–1.3)
D DIMER BLD IA.RAPID-MCNC: <150 NG/ML DDU — SIGNIFICANT CHANGE UP
DIFF PNL FLD: NEGATIVE — SIGNIFICANT CHANGE UP
DIFF PNL FLD: NEGATIVE — SIGNIFICANT CHANGE UP
EOSINOPHIL # BLD AUTO: 0.11 K/UL — SIGNIFICANT CHANGE UP (ref 0–0.5)
EOSINOPHIL NFR BLD AUTO: 1.9 % — SIGNIFICANT CHANGE UP (ref 0–6)
GLUCOSE SERPL-MCNC: 99 MG/DL — SIGNIFICANT CHANGE UP (ref 70–99)
GLUCOSE UR QL: NEGATIVE MG/DL — SIGNIFICANT CHANGE UP
GLUCOSE UR QL: NEGATIVE MG/DL — SIGNIFICANT CHANGE UP
HCT VFR BLD CALC: 44.4 % — SIGNIFICANT CHANGE UP (ref 39–50)
HGB BLD-MCNC: 13.5 G/DL — SIGNIFICANT CHANGE UP (ref 13–17)
IMM GRANULOCYTES NFR BLD AUTO: 0.2 % — SIGNIFICANT CHANGE UP (ref 0–1.5)
KETONES UR-MCNC: NEGATIVE — SIGNIFICANT CHANGE UP
KETONES UR-MCNC: NEGATIVE — SIGNIFICANT CHANGE UP
LEUKOCYTE ESTERASE UR-ACNC: NEGATIVE — SIGNIFICANT CHANGE UP
LEUKOCYTE ESTERASE UR-ACNC: NEGATIVE — SIGNIFICANT CHANGE UP
LYMPHOCYTES # BLD AUTO: 1.78 K/UL — SIGNIFICANT CHANGE UP (ref 1–3.3)
LYMPHOCYTES # BLD AUTO: 30 % — SIGNIFICANT CHANGE UP (ref 13–44)
MAGNESIUM SERPL-MCNC: 2.4 MG/DL — SIGNIFICANT CHANGE UP (ref 1.6–2.6)
MANUAL SMEAR VERIFICATION: SIGNIFICANT CHANGE UP
MCHC RBC-ENTMCNC: 22.5 PG — LOW (ref 27–34)
MCHC RBC-ENTMCNC: 30.4 GM/DL — LOW (ref 32–36)
MCV RBC AUTO: 74.1 FL — LOW (ref 80–100)
MICROCYTES BLD QL: SLIGHT — SIGNIFICANT CHANGE UP
MONOCYTES # BLD AUTO: 0.52 K/UL — SIGNIFICANT CHANGE UP (ref 0–0.9)
MONOCYTES NFR BLD AUTO: 8.8 % — SIGNIFICANT CHANGE UP (ref 2–14)
NEUTROPHILS # BLD AUTO: 3.49 K/UL — SIGNIFICANT CHANGE UP (ref 1.8–7.4)
NEUTROPHILS NFR BLD AUTO: 58.6 % — SIGNIFICANT CHANGE UP (ref 43–77)
NITRITE UR-MCNC: NEGATIVE — SIGNIFICANT CHANGE UP
NITRITE UR-MCNC: NEGATIVE — SIGNIFICANT CHANGE UP
NRBC # BLD: 0 /100 WBCS — SIGNIFICANT CHANGE UP (ref 0–0)
NT-PROBNP SERPL-SCNC: 10 PG/ML — SIGNIFICANT CHANGE UP (ref 0–125)
PH UR: 8 — SIGNIFICANT CHANGE UP (ref 5–8)
PH UR: 8 — SIGNIFICANT CHANGE UP (ref 5–8)
PLAT MORPH BLD: NORMAL — SIGNIFICANT CHANGE UP
PLATELET # BLD AUTO: 257 K/UL — SIGNIFICANT CHANGE UP (ref 150–400)
POTASSIUM SERPL-MCNC: 3.4 MMOL/L — LOW (ref 3.5–5.3)
POTASSIUM SERPL-SCNC: 3.4 MMOL/L — LOW (ref 3.5–5.3)
PROT SERPL-MCNC: 7.8 GM/DL — SIGNIFICANT CHANGE UP (ref 6–8.3)
PROT UR-MCNC: NEGATIVE MG/DL — SIGNIFICANT CHANGE UP
PROT UR-MCNC: NEGATIVE MG/DL — SIGNIFICANT CHANGE UP
RBC # BLD: 5.99 M/UL — HIGH (ref 4.2–5.8)
RBC # FLD: 14.8 % — HIGH (ref 10.3–14.5)
RBC BLD AUTO: SIGNIFICANT CHANGE UP
SCHISTOCYTES BLD QL AUTO: SLIGHT — SIGNIFICANT CHANGE UP
SODIUM SERPL-SCNC: 137 MMOL/L — SIGNIFICANT CHANGE UP (ref 135–145)
SP GR SPEC: 1.01 — SIGNIFICANT CHANGE UP (ref 1.01–1.02)
SP GR SPEC: 1.01 — SIGNIFICANT CHANGE UP (ref 1.01–1.02)
TROPONIN I, HIGH SENSITIVITY RESULT: 10.4 NG/L — SIGNIFICANT CHANGE UP
UROBILINOGEN FLD QL: NEGATIVE MG/DL — SIGNIFICANT CHANGE UP
UROBILINOGEN FLD QL: NEGATIVE MG/DL — SIGNIFICANT CHANGE UP
WBC # BLD: 5.94 K/UL — SIGNIFICANT CHANGE UP (ref 3.8–10.5)
WBC # FLD AUTO: 5.94 K/UL — SIGNIFICANT CHANGE UP (ref 3.8–10.5)

## 2021-10-26 PROCEDURE — 74176 CT ABD & PELVIS W/O CONTRAST: CPT | Mod: 26,MA

## 2021-10-26 PROCEDURE — 93010 ELECTROCARDIOGRAM REPORT: CPT

## 2021-10-26 PROCEDURE — 71045 X-RAY EXAM CHEST 1 VIEW: CPT | Mod: 26

## 2021-10-26 PROCEDURE — 99285 EMERGENCY DEPT VISIT HI MDM: CPT

## 2021-10-26 RX ORDER — HYDRALAZINE HCL 50 MG
10 TABLET ORAL ONCE
Refills: 0 | Status: COMPLETED | OUTPATIENT
Start: 2021-10-26 | End: 2021-10-26

## 2021-10-26 RX ORDER — CARVEDILOL PHOSPHATE 80 MG/1
1 CAPSULE, EXTENDED RELEASE ORAL
Qty: 60 | Refills: 0
Start: 2021-10-26 | End: 2021-11-24

## 2021-10-26 RX ORDER — POTASSIUM CHLORIDE 20 MEQ
40 PACKET (EA) ORAL ONCE
Refills: 0 | Status: COMPLETED | OUTPATIENT
Start: 2021-10-26 | End: 2021-10-26

## 2021-10-26 RX ORDER — HYDRALAZINE HCL 50 MG
15 TABLET ORAL ONCE
Refills: 0 | Status: DISCONTINUED | OUTPATIENT
Start: 2021-10-26 | End: 2021-10-26

## 2021-10-26 RX ADMIN — Medication 10 MILLIGRAM(S): at 10:12

## 2021-10-26 RX ADMIN — Medication 40 MILLIEQUIVALENT(S): at 11:10

## 2021-10-26 NOTE — ED PROVIDER NOTE - OBJECTIVE STATEMENT
45 yo male w/PMH of HTN, epilepsy, GERD presents to the ED for elevated BP. Reports last few weeks with elevated BP, PMD gave pt a clonidine patch with no relief. Reports this morning noted BP of 159/100 as well as a burning, non-radiating CP rated 5/10. Pain worsened lying supine. Non-raditaing and worsened w/lying supine which has been present off and on for x2 months. Also endorses warm sensaiton to his extremities which tells him that his BP is elevated. Denies fever/chills, cough, SOB, palpitations, abdominal pain, neck pain, N/V/D, numbness/tingling or headache. Pt is not vaccinated against COVID.

## 2021-10-26 NOTE — ED PROVIDER NOTE - CLINICAL SUMMARY MEDICAL DECISION MAKING FREE TEXT BOX
Uncontrolled HTN, obtain imaging, labs including high sensitivity troponin, likely start on beta blocker upon discharge. Uncontrolled HTN, obtain imaging, labs including high sensitivity troponin, likely start on beta blocker upon discharge.  As interpreted by ED physician, ECG is NSR with normal intervals/axis, no changes in QRS, no ST/T changes.

## 2021-10-26 NOTE — ED PROVIDER NOTE - NSFOLLOWUPINSTRUCTIONS_ED_ALL_ED_FT
Start taking the carvedilol twice daily as prescribed to help with your blood pressure.     You will need to increase the dosage but your regular doctor will take care of this.    Be sure to improve your diet and increase exercise. Start taking the ***carvedilol*** twice daily as prescribed to help with your blood pressure.     You will need to increase the dosage but your regular doctor will take care of this.    Be sure to improve your diet and increase exercise.

## 2021-10-26 NOTE — ED ADULT TRIAGE NOTE - SOURCE OF INFORMATION
PRESENTING COMPLAINT:  Shortness of breath for 1 week.    HISTORY OF PRESENT ILLNESS:  The patient is a 50-year-old gentleman with a  history of hyperlipidemia, diabetes, hypertension, chronic systolic heart  failure, status post AICD, and pulmonary embolism.  He presented to the  emergency room on the 28th of January 2019 with complaints of shortness of  breath that had gotten progressively worse over the last 1-2 weeks.  He has  also noticed some sitting up orthopnea, but denies any PND.  He has also  noticed some mild leg swelling.  He denies any chest pains, and there has  been no nausea, vomiting, or diarrhea.  Following evaluation in the emergency  room, he was found to have features of acute on chronic systolic heart  failure and has been admitted for management.     PAST MEDICAL HISTORY:  As documented above.    MEDICATIONS:  Allopurinol, Eliquis, aspirin, Symbicort, furosemide, losartan,  metoprolol, potassium.     ALLERGIES:  He is allergic to ACE inhibitors.    FAMILY AND SOCIAL HISTORY:  Lives with a supportive family.  Does not smoke,  drink, or use recreational drugs.     REVIEW OF SYSTEMS:  A 10-point review of systems is essentially negative.    PHYSICAL EXAMINATION:  GENERAL:  He is a middle-aged gentleman, lying in bed, in no apparent pain or  respiratory distress.   VITAL SIGNS:  At the time of examination, temperature of 35.9, pulse of 96,  and blood pressure of 117/83.   HEENT:  No pallor.  No jaundice.  No nystagmus.   NECK:  No JVD.   LUNGS:  He has bibasilar crepitations.   HEART:  S1, S2.  No S3.  No murmurs.   ABDOMEN:  Moves normally with respiration.  No demonstrable areas of  tenderness.  Liver and spleen are not palpably enlarged.  Bowel sounds were  heard and normoactive.   EXTREMITIES:  No pitting pedal edema.    PERTINENT LABS:  Sodium 139, potassium 3.7, BUN 17, creatinine 1.27.  White  count of 5.8, hemoglobin of 13.8.  Chest x-ray shows stable moderate  cardiomegaly with  dual-chamber pacemaker, clear lungs.  No pneumothorax or  effusion.  V/Q scan shows low probability for the presence of PE.  D-dimer is  6.88.  ProBNP 989.     ASSESSMENT:    1. Acute-on-chronic systolic heart failure.  2. History of pulmonary embolism.  3. Hypertension.  4. Diabetes.    PLAN:  This patient has been admitted and commenced on diuretics.  A consult  will be sought with the Cardiology Services to assist with management.  Tight  control of diabetes and blood pressure will be ensured during this admission.             Tommy Loco MD    OS/MedQ     DD:  01/29/2019 19:32:30 / DT:  01/29/2019 22:58:04     Job #:  [id number]    Patient

## 2021-10-26 NOTE — ED PROVIDER NOTE - PHYSICAL EXAMINATION
Gen: Alert, NAD  Head: NC, AT   Eyes: PERRL, EOMI, normal lids/conjunctiva  ENT: normal hearing, patent oropharynx without erythema/exudate, uvula midline  Neck: supple, no tenderness, Trachea midline  Pulm: Bilateral BS, normal resp effort, no wheeze/stridor/retractions  CV: RRR, no R/G, 2+ radial and dp pulses bl, no edema, 2/6 systolic murmur best heard in left upper sternal border  Abd: soft, NT/ND, +BS, no hepatosplenomegaly  Mskel: extremities x4 with normal ROM and no joint effusions. no ctl spine ttp.   Skin: no rash, no bruising   Neuro: AAOx3, no sensory/motor deficits, CN 2-12 intact

## 2021-10-26 NOTE — ED PROVIDER NOTE - PATIENT PORTAL LINK FT
You can access the FollowMyHealth Patient Portal offered by [facility] by registering at the following website: [email or web address]. By joining [facility]’s FollowMyHealth portal, you will also be able to view your health information using other applications (apps) compatible with our system.

## 2021-10-26 NOTE — ED ADULT NURSE NOTE - OBJECTIVE STATEMENT
46 year old male hx htn, seizures c/o chest pain and 46 year old male hx htn, seizures c/o chest pain and high blood pressure for the passed 3 mon. Patient has been to the ER and PCP several times medication modification but pressure remains still high. Patient states going through stressful events at home. Denies any headache, or lightheadedness. However patient mentions feeling a rushing feeling that travels up to his lower back from LE right before blood pressure is noted as high.

## 2021-10-26 NOTE — ED ADULT NURSE NOTE - EXTENSIONS OF SELF_ADULT
Report called to Rachel Kraus at [facility] [place]. No further questions verbalized. None

## 2022-01-01 NOTE — ED PROVIDER NOTE - CPE EDP NEURO NORM
Final Anesthesia Post-op Assessment    Patient: Florinda Echeverria  Procedure(s) Performed: MRI INTERNAL AUDITORY CANAL W WO CONTRAST  Anesthesia type: General    Vitals Value Taken Time   Temp 36.5 °C (97.7 °F) 12/05/22 1318   Pulse 156 12/05/22 1318   Resp 44 12/05/22 1318   SpO2 97 % 12/05/22 1321   /90 12/05/22 1318         Patient Location: PACU Phase 1  Post-op Vital Signs:stable  Level of Consciousness: agitated and responds to stimulation  Respiratory Status: spontaneous ventilation and nasal cannula  Cardiovascular stable  Hydration: euvolemic  Pain Management: adequately controlled  Handoff: Handoff to receiving clinician was performed and questions were answered  Vomiting: none  Nausea: None  Airway Patency:patent  Post-op Assessment: no complications and patient tolerated procedure well with no complications      There were no known complications for this encounter.   
normal...

## 2022-03-01 ENCOUNTER — INPATIENT (INPATIENT)
Facility: HOSPITAL | Age: 47
LOS: 0 days | Discharge: HOME | End: 2022-03-02
Attending: INTERNAL MEDICINE | Admitting: INTERNAL MEDICINE
Payer: COMMERCIAL

## 2022-03-01 VITALS
HEIGHT: 68 IN | DIASTOLIC BLOOD PRESSURE: 118 MMHG | RESPIRATION RATE: 19 BRPM | WEIGHT: 181 LBS | SYSTOLIC BLOOD PRESSURE: 211 MMHG | OXYGEN SATURATION: 99 % | TEMPERATURE: 98 F | HEART RATE: 61 BPM

## 2022-03-01 DIAGNOSIS — G40.919 EPILEPSY, UNSPECIFIED, INTRACTABLE, WITHOUT STATUS EPILEPTICUS: ICD-10-CM

## 2022-03-01 DIAGNOSIS — E87.5 HYPERKALEMIA: ICD-10-CM

## 2022-03-01 DIAGNOSIS — I10 ESSENTIAL (PRIMARY) HYPERTENSION: ICD-10-CM

## 2022-03-01 LAB
ALBUMIN SERPL ELPH-MCNC: 3.3 G/DL — SIGNIFICANT CHANGE UP (ref 3.3–5)
ALP SERPL-CCNC: 107 U/L — SIGNIFICANT CHANGE UP (ref 40–120)
ALT FLD-CCNC: 31 U/L — SIGNIFICANT CHANGE UP (ref 12–78)
AMPHET UR-MCNC: NEGATIVE — SIGNIFICANT CHANGE UP
ANION GAP SERPL CALC-SCNC: 1 MMOL/L — LOW (ref 5–17)
ANION GAP SERPL CALC-SCNC: 5 MMOL/L — SIGNIFICANT CHANGE UP (ref 5–17)
AST SERPL-CCNC: 46 U/L — HIGH (ref 15–37)
BARBITURATES UR SCN-MCNC: NEGATIVE — SIGNIFICANT CHANGE UP
BASOPHILS # BLD AUTO: 0.02 K/UL — SIGNIFICANT CHANGE UP (ref 0–0.2)
BASOPHILS NFR BLD AUTO: 0.3 % — SIGNIFICANT CHANGE UP (ref 0–2)
BENZODIAZ UR-MCNC: NEGATIVE — SIGNIFICANT CHANGE UP
BILIRUB SERPL-MCNC: 0.3 MG/DL — SIGNIFICANT CHANGE UP (ref 0.2–1.2)
BUN SERPL-MCNC: 12 MG/DL — SIGNIFICANT CHANGE UP (ref 7–23)
BUN SERPL-MCNC: 13 MG/DL — SIGNIFICANT CHANGE UP (ref 7–23)
CALCIUM SERPL-MCNC: 8.5 MG/DL — SIGNIFICANT CHANGE UP (ref 8.5–10.1)
CALCIUM SERPL-MCNC: 8.7 MG/DL — SIGNIFICANT CHANGE UP (ref 8.5–10.1)
CHLORIDE SERPL-SCNC: 105 MMOL/L — SIGNIFICANT CHANGE UP (ref 96–108)
CHLORIDE SERPL-SCNC: 108 MMOL/L — SIGNIFICANT CHANGE UP (ref 96–108)
CK SERPL-CCNC: 149 U/L — SIGNIFICANT CHANGE UP (ref 26–308)
CO2 SERPL-SCNC: 28 MMOL/L — SIGNIFICANT CHANGE UP (ref 22–31)
CO2 SERPL-SCNC: 31 MMOL/L — SIGNIFICANT CHANGE UP (ref 22–31)
COCAINE METAB.OTHER UR-MCNC: NEGATIVE — SIGNIFICANT CHANGE UP
CREAT SERPL-MCNC: 1.05 MG/DL — SIGNIFICANT CHANGE UP (ref 0.5–1.3)
CREAT SERPL-MCNC: 1.15 MG/DL — SIGNIFICANT CHANGE UP (ref 0.5–1.3)
EGFR: 79 ML/MIN/1.73M2 — SIGNIFICANT CHANGE UP
EGFR: 88 ML/MIN/1.73M2 — SIGNIFICANT CHANGE UP
EOSINOPHIL # BLD AUTO: 0.08 K/UL — SIGNIFICANT CHANGE UP (ref 0–0.5)
EOSINOPHIL NFR BLD AUTO: 1.3 % — SIGNIFICANT CHANGE UP (ref 0–6)
ETHANOL SERPL-MCNC: <10 MG/DL — SIGNIFICANT CHANGE UP (ref 0–10)
FLUAV AG NPH QL: SIGNIFICANT CHANGE UP
FLUBV AG NPH QL: SIGNIFICANT CHANGE UP
GLUCOSE BLDC GLUCOMTR-MCNC: 86 MG/DL — SIGNIFICANT CHANGE UP (ref 70–99)
GLUCOSE SERPL-MCNC: 105 MG/DL — HIGH (ref 70–99)
GLUCOSE SERPL-MCNC: 87 MG/DL — SIGNIFICANT CHANGE UP (ref 70–99)
HCT VFR BLD CALC: 40.6 % — SIGNIFICANT CHANGE UP (ref 39–50)
HGB BLD-MCNC: 12.5 G/DL — LOW (ref 13–17)
IMM GRANULOCYTES NFR BLD AUTO: 0.2 % — SIGNIFICANT CHANGE UP (ref 0–1.5)
LACTATE SERPL-SCNC: 0.9 MMOL/L — SIGNIFICANT CHANGE UP (ref 0.7–2)
LYMPHOCYTES # BLD AUTO: 1.92 K/UL — SIGNIFICANT CHANGE UP (ref 1–3.3)
LYMPHOCYTES # BLD AUTO: 31.7 % — SIGNIFICANT CHANGE UP (ref 13–44)
MAGNESIUM SERPL-MCNC: 2.4 MG/DL — SIGNIFICANT CHANGE UP (ref 1.6–2.6)
MCHC RBC-ENTMCNC: 22.9 PG — LOW (ref 27–34)
MCHC RBC-ENTMCNC: 30.8 G/DL — LOW (ref 32–36)
MCV RBC AUTO: 74.5 FL — LOW (ref 80–100)
METHADONE UR-MCNC: NEGATIVE — SIGNIFICANT CHANGE UP
MONOCYTES # BLD AUTO: 0.64 K/UL — SIGNIFICANT CHANGE UP (ref 0–0.9)
MONOCYTES NFR BLD AUTO: 10.6 % — SIGNIFICANT CHANGE UP (ref 2–14)
NEUTROPHILS # BLD AUTO: 3.39 K/UL — SIGNIFICANT CHANGE UP (ref 1.8–7.4)
NEUTROPHILS NFR BLD AUTO: 55.9 % — SIGNIFICANT CHANGE UP (ref 43–77)
NRBC # BLD: 0 /100 WBCS — SIGNIFICANT CHANGE UP (ref 0–0)
OPIATES UR-MCNC: NEGATIVE — SIGNIFICANT CHANGE UP
PCP SPEC-MCNC: SIGNIFICANT CHANGE UP
PCP UR-MCNC: NEGATIVE — SIGNIFICANT CHANGE UP
PHENYTOIN FREE SERPL-MCNC: 16.6 UG/ML — SIGNIFICANT CHANGE UP (ref 10–20)
PLATELET # BLD AUTO: 261 K/UL — SIGNIFICANT CHANGE UP (ref 150–400)
POTASSIUM SERPL-MCNC: 3.6 MMOL/L — SIGNIFICANT CHANGE UP (ref 3.5–5.3)
POTASSIUM SERPL-MCNC: 5.4 MMOL/L — HIGH (ref 3.5–5.3)
POTASSIUM SERPL-SCNC: 3.6 MMOL/L — SIGNIFICANT CHANGE UP (ref 3.5–5.3)
POTASSIUM SERPL-SCNC: 5.4 MMOL/L — HIGH (ref 3.5–5.3)
PROT SERPL-MCNC: 7.9 GM/DL — SIGNIFICANT CHANGE UP (ref 6–8.3)
RBC # BLD: 5.45 M/UL — SIGNIFICANT CHANGE UP (ref 4.2–5.8)
RBC # FLD: 15.9 % — HIGH (ref 10.3–14.5)
SARS-COV-2 RNA SPEC QL NAA+PROBE: SIGNIFICANT CHANGE UP
SODIUM SERPL-SCNC: 138 MMOL/L — SIGNIFICANT CHANGE UP (ref 135–145)
SODIUM SERPL-SCNC: 140 MMOL/L — SIGNIFICANT CHANGE UP (ref 135–145)
THC UR QL: NEGATIVE — SIGNIFICANT CHANGE UP
WBC # BLD: 6.06 K/UL — SIGNIFICANT CHANGE UP (ref 3.8–10.5)
WBC # FLD AUTO: 6.06 K/UL — SIGNIFICANT CHANGE UP (ref 3.8–10.5)

## 2022-03-01 PROCEDURE — 99223 1ST HOSP IP/OBS HIGH 75: CPT

## 2022-03-01 PROCEDURE — 99285 EMERGENCY DEPT VISIT HI MDM: CPT

## 2022-03-01 PROCEDURE — 70450 CT HEAD/BRAIN W/O DYE: CPT | Mod: 26,MA

## 2022-03-01 PROCEDURE — 93010 ELECTROCARDIOGRAM REPORT: CPT

## 2022-03-01 RX ORDER — CARVEDILOL PHOSPHATE 80 MG/1
12.5 CAPSULE, EXTENDED RELEASE ORAL EVERY 12 HOURS
Refills: 0 | Status: DISCONTINUED | OUTPATIENT
Start: 2022-03-01 | End: 2022-03-02

## 2022-03-01 RX ORDER — ATENOLOL 25 MG/1
0 TABLET ORAL
Qty: 0 | Refills: 0 | DISCHARGE

## 2022-03-01 RX ORDER — AMLODIPINE BESYLATE 2.5 MG/1
5 TABLET ORAL DAILY
Refills: 0 | Status: DISCONTINUED | OUTPATIENT
Start: 2022-03-01 | End: 2022-03-02

## 2022-03-01 RX ORDER — ACETAMINOPHEN 500 MG
650 TABLET ORAL EVERY 6 HOURS
Refills: 0 | Status: DISCONTINUED | OUTPATIENT
Start: 2022-03-01 | End: 2022-03-02

## 2022-03-01 RX ORDER — INFLUENZA VIRUS VACCINE 15; 15; 15; 15 UG/.5ML; UG/.5ML; UG/.5ML; UG/.5ML
0.5 SUSPENSION INTRAMUSCULAR ONCE
Refills: 0 | Status: DISCONTINUED | OUTPATIENT
Start: 2022-03-01 | End: 2022-03-02

## 2022-03-01 RX ORDER — LANOLIN ALCOHOL/MO/W.PET/CERES
3 CREAM (GRAM) TOPICAL AT BEDTIME
Refills: 0 | Status: DISCONTINUED | OUTPATIENT
Start: 2022-03-01 | End: 2022-03-02

## 2022-03-01 RX ORDER — LEVETIRACETAM 250 MG/1
1000 TABLET, FILM COATED ORAL ONCE
Refills: 0 | Status: COMPLETED | OUTPATIENT
Start: 2022-03-01 | End: 2022-03-01

## 2022-03-01 RX ADMIN — Medication 650 MILLIGRAM(S): at 22:27

## 2022-03-01 RX ADMIN — Medication 650 MILLIGRAM(S): at 21:27

## 2022-03-01 RX ADMIN — LEVETIRACETAM 400 MILLIGRAM(S): 250 TABLET, FILM COATED ORAL at 13:08

## 2022-03-01 RX ADMIN — CARVEDILOL PHOSPHATE 12.5 MILLIGRAM(S): 80 CAPSULE, EXTENDED RELEASE ORAL at 17:20

## 2022-03-01 RX ADMIN — Medication 200 MILLIGRAM(S): at 21:21

## 2022-03-01 RX ADMIN — AMLODIPINE BESYLATE 5 MILLIGRAM(S): 2.5 TABLET ORAL at 17:20

## 2022-03-01 NOTE — H&P ADULT - ASSESSMENT
46 years old male with h/o HTN, seizure disorder since childhood present to ED with complain of seizure.  Hypertensive to 211/118 upon arrival, improve to 146/88 without intervention. EKG with NSR< VR 67, QTc 395. No leukocytosis. K 5.4, Cr 1.15, glucose 87, phenytoin  16.6. CT head with no acute pathology. Patient is loaded with IV keppra 1g in ED    Admitted with breakthrough seizure

## 2022-03-01 NOTE — H&P ADULT - PROBLEM SELECTOR PLAN 2
K 5.4, likely in the setting of seizure. Cr at baseline. No EKG changes  Will repeat BMP in PM, if still elevated, will then given 1 dose of Kayexalate

## 2022-03-01 NOTE — H&P ADULT - NSHPPHYSICALEXAM_GEN_ALL_CORE
CONSTITUTIONAL: Well developed, well nourished, alert and cooperative, no acute distress.  EYES: PERRL, EOMI  ENT: Mucosa moist, tongue normal.   NECK: Neck supple, trachea midline, non-tender  CARDIAC: Normal S1 and S2. Regular rate and rhythms. No murmurs. No Pedal edema. Peripheral pulses intact  LUNGS: Clear to auscultation, equal air entry both lungs. No rales, rhonchi, wheezing. Normal respiratory effort.   ABDOMEN: Soft, nondistended, nontender. No guarding or rebound tenderness. No hepatomegaly or splenomegaly. Bowel sound normal.   MUSCULOSKELETAL: Normocephalic, atraumatic. No significant deformity or joint abnormality.  NEUROLOGICAL: No gross motor or sensory deficits.  SKIN: no lesions or eruptions. Normal turgor  PSYCHIATRIC: A&O x 3, appropriate mood and affect

## 2022-03-01 NOTE — ED PROVIDER NOTE - OBJECTIVE STATEMENT
47yo male with pmh of seizures (since childhood, on dilantin BID, last dose this morning) presents after having a  seizure while sitting down at home. Reports it was unwitnessed but when he awoke he felt sore everywhere and "off" like when he has a seizure. Last seizure was 2 years ago. Reports in the preceding days he has been feeling weaker and having generalized body aches. Denies headache, head trauma, fevers/chills, chest pain, sob, and other associated sx.

## 2022-03-01 NOTE — ED ADULT NURSE NOTE - OBJECTIVE STATEMENT
A&Ox4, ambulating. presents with seizure this AM at home, unwitnessed. pt states "I was sitting when I had the seizure" pt denies urine incontinence or injury.

## 2022-03-01 NOTE — ED PROVIDER NOTE - ATTENDING CONTRIBUTION TO CARE
46 years old male here c/o seizure while sitting down at home this morning. Pt sts he has a hx of seizure on dilantin 100 mg in am and 200 mg at bed time last dose this morning, Pt has hx of seizure since childhood last seizure was two years ago. Pt denies trauma to the head, headache, dizziness, blurred visions, light sensitivities, focal/distal weakness or numbness, neck/back/hips pain, cough, sob, chest pain nausea, vomiting, fever, chills, abd pain, dysuria or irregular bowel movements., Pt is alert and oriented x 3 speaking in clear full sentences no nasal flaring no shoulders retractions no diaphoresis, s1/s2 normal, breath sounds are clear equal bilaterally, no hematoma no wound to the scalp and face, no photophobia bilaterally, abd is soft nontender to palp x 3 Pt is alert and oriented x 3 no focal neuro deficits on exam. Agree with PA eval/treatment/dispo.

## 2022-03-01 NOTE — ED PROVIDER NOTE - NS ED ROS FT
Constitutional: (-) Fever, (-) Anorexia, (-) Generalized Malaise  Eyes: (-)Discharge, (-) Irritation,  (-) Visual changes  EARS: (-) Ear Pain, (-) Apparent hearing changes  NOSE: (-) Congestion, (-) Bloody nose  MOUTH/THROAT: (-) Vocal Changes, (-) Drooling, (-) Sore throat  NECK: (-) Lumps, (-) Stiffness, (-) Pain  CV: (-) Chest Pain, (-) Palpitations, (-) Edema   RESP:  (-) Cough, (-) SOB, (-) LIN,  (-) Wheezing  GI: (-) Nausea, (-) Vomiting, (-) Abdominal Pain, (-) Diarrhea, (-) Constipation, (-) Bloody stools  : (-) Dysuria, (-) Frequency, (-) Hematuria, (-) Incontinence  MSK: (-) Joint Pain, (-) Back Pain, (-) Deformities  SKIN: (-) Wounds, (-) Color change, (-)Rash, (-) Swelling  NEURO:(-) Headache, (-) Dizziness, (-) Numbness/Tingling,  (+)LOC

## 2022-03-01 NOTE — ED PROVIDER NOTE - CONSULTING PHYSICIAN
Dr. Beltran neurologist is notified Dr. Beltran neurologist is notified sts start keppra 1 gram ivpb and admit pt.

## 2022-03-01 NOTE — H&P ADULT - PROBLEM SELECTOR PLAN 1
h/o partial seizure since childhood come in with breakthrough seizure. Last seizure was 2 years ago. Compliant with medication. Reported feeling stressful recently  phenytoin level 16.6  CT head with no acute pathology  Loaded with IV keppra 1G  Check lactate, CK, Utox, serum alcohol  EEG  ED consulted neurology  Seizure/aspiration precaution  Continue dilantin 100mg AM, 200mg PM h/o partial seizure since childhood come in with breakthrough seizure. Last seizure was 2 years ago. Compliant with medication. Reported feeling stressful recently  phenytoin level 16.6  CT head with no acute pathology  Loaded with IV keppra 1G  Check lactate, CK, prolactin, Utox, serum alcohol  EEG  ED consulted neurology  Seizure/aspiration precaution  Continue dilantin 100mg AM, 200mg PM

## 2022-03-01 NOTE — ED PROVIDER NOTE - CLINICAL SUMMARY MEDICAL DECISION MAKING FREE TEXT BOX
47yo male with pmh of seizures (since childhood, on dilantin BID, last dose this morning) presents after having a  seizure while sitting down at home. Well appearing, no signs of trauma. Neurologically intact. Pt hypertensive in triage - will give antihypertensives, get labs, CTH and consult neurology. Dispo pending work up

## 2022-03-01 NOTE — H&P ADULT - HISTORY OF PRESENT ILLNESS
46 years old male with h/o HTN, seizure disorder since childhood present to ED with complain of seizure. Today AM when patient woke up, he had one episode of seizure, which is right arm shaking, lasting for a few seconds (typical for his seizure). No LOC, tongue biting, loss of bowel/bladder control. Last seizure was 2 years ago. Reported some stress recently.   Hypertensive to 211/118 upon arrival, improve to 146/88 without intervention. EKG with NSR< VR 67, QTc 395. No leukocytosis. K 5.4, Cr 1.15, glucose 87, phenytoin  16.6. CT head with no acute pathology. Patient is loaded with IV keppra 1g in ED    SH: no toxic habits  FH: mother-HTN

## 2022-03-01 NOTE — ED ADULT TRIAGE NOTE - CHIEF COMPLAINT QUOTE
States woke up after seizure this morning, Last seizure 2weeks ago  On Dilantin, speaking clearly in triage  c/o body aches, HA and neck pain  HX seizure, HTN took meds

## 2022-03-02 ENCOUNTER — TRANSCRIPTION ENCOUNTER (OUTPATIENT)
Age: 47
End: 2022-03-02

## 2022-03-02 VITALS
TEMPERATURE: 98 F | RESPIRATION RATE: 18 BRPM | HEART RATE: 62 BPM | OXYGEN SATURATION: 99 % | DIASTOLIC BLOOD PRESSURE: 92 MMHG | SYSTOLIC BLOOD PRESSURE: 149 MMHG

## 2022-03-02 LAB
ALBUMIN SERPL ELPH-MCNC: 2.9 G/DL — LOW (ref 3.3–5)
ALP SERPL-CCNC: 94 U/L — SIGNIFICANT CHANGE UP (ref 40–120)
ALT FLD-CCNC: 28 U/L — SIGNIFICANT CHANGE UP (ref 12–78)
ANION GAP SERPL CALC-SCNC: 4 MMOL/L — LOW (ref 5–17)
AST SERPL-CCNC: 26 U/L — SIGNIFICANT CHANGE UP (ref 15–37)
BILIRUB SERPL-MCNC: 0.3 MG/DL — SIGNIFICANT CHANGE UP (ref 0.2–1.2)
BUN SERPL-MCNC: 12 MG/DL — SIGNIFICANT CHANGE UP (ref 7–23)
CALCIUM SERPL-MCNC: 8.4 MG/DL — LOW (ref 8.5–10.1)
CHLORIDE SERPL-SCNC: 106 MMOL/L — SIGNIFICANT CHANGE UP (ref 96–108)
CO2 SERPL-SCNC: 28 MMOL/L — SIGNIFICANT CHANGE UP (ref 22–31)
CREAT SERPL-MCNC: 1.16 MG/DL — SIGNIFICANT CHANGE UP (ref 0.5–1.3)
EGFR: 78 ML/MIN/1.73M2 — SIGNIFICANT CHANGE UP
GLUCOSE SERPL-MCNC: 93 MG/DL — SIGNIFICANT CHANGE UP (ref 70–99)
HCT VFR BLD CALC: 39.8 % — SIGNIFICANT CHANGE UP (ref 39–50)
HGB BLD-MCNC: 12.1 G/DL — LOW (ref 13–17)
MAGNESIUM SERPL-MCNC: 1.3 MG/DL — LOW (ref 1.6–2.6)
MCHC RBC-ENTMCNC: 22.4 PG — LOW (ref 27–34)
MCHC RBC-ENTMCNC: 30.4 G/DL — LOW (ref 32–36)
MCV RBC AUTO: 73.8 FL — LOW (ref 80–100)
NRBC # BLD: 0 /100 WBCS — SIGNIFICANT CHANGE UP (ref 0–0)
PHOSPHATE SERPL-MCNC: 3.3 MG/DL — SIGNIFICANT CHANGE UP (ref 2.5–4.5)
PLATELET # BLD AUTO: 265 K/UL — SIGNIFICANT CHANGE UP (ref 150–400)
POTASSIUM SERPL-MCNC: 3.8 MMOL/L — SIGNIFICANT CHANGE UP (ref 3.5–5.3)
POTASSIUM SERPL-SCNC: 3.8 MMOL/L — SIGNIFICANT CHANGE UP (ref 3.5–5.3)
PROLACTIN SERPL-MCNC: 12.3 NG/ML — SIGNIFICANT CHANGE UP (ref 4.1–18.4)
PROT SERPL-MCNC: 6.7 GM/DL — SIGNIFICANT CHANGE UP (ref 6–8.3)
RBC # BLD: 5.39 M/UL — SIGNIFICANT CHANGE UP (ref 4.2–5.8)
RBC # FLD: 15.7 % — HIGH (ref 10.3–14.5)
SODIUM SERPL-SCNC: 138 MMOL/L — SIGNIFICANT CHANGE UP (ref 135–145)
WBC # BLD: 6.16 K/UL — SIGNIFICANT CHANGE UP (ref 3.8–10.5)
WBC # FLD AUTO: 6.16 K/UL — SIGNIFICANT CHANGE UP (ref 3.8–10.5)

## 2022-03-02 PROCEDURE — 95816 EEG AWAKE AND DROWSY: CPT | Mod: 26

## 2022-03-02 PROCEDURE — 99239 HOSP IP/OBS DSCHRG MGMT >30: CPT

## 2022-03-02 RX ORDER — MAGNESIUM SULFATE 500 MG/ML
2 VIAL (ML) INJECTION
Refills: 0 | Status: COMPLETED | OUTPATIENT
Start: 2022-03-02 | End: 2022-03-02

## 2022-03-02 RX ORDER — MAGNESIUM OXIDE 400 MG ORAL TABLET 241.3 MG
1 TABLET ORAL
Qty: 30 | Refills: 0
Start: 2022-03-02 | End: 2022-03-31

## 2022-03-02 RX ORDER — MAGNESIUM OXIDE 400 MG ORAL TABLET 241.3 MG
400 TABLET ORAL DAILY
Refills: 0 | Status: DISCONTINUED | OUTPATIENT
Start: 2022-03-02 | End: 2022-03-02

## 2022-03-02 RX ADMIN — CARVEDILOL PHOSPHATE 12.5 MILLIGRAM(S): 80 CAPSULE, EXTENDED RELEASE ORAL at 05:06

## 2022-03-02 RX ADMIN — Medication 25 GRAM(S): at 12:05

## 2022-03-02 RX ADMIN — MAGNESIUM OXIDE 400 MG ORAL TABLET 400 MILLIGRAM(S): 241.3 TABLET ORAL at 15:35

## 2022-03-02 RX ADMIN — AMLODIPINE BESYLATE 5 MILLIGRAM(S): 2.5 TABLET ORAL at 05:06

## 2022-03-02 RX ADMIN — Medication 25 GRAM(S): at 13:45

## 2022-03-02 RX ADMIN — CARVEDILOL PHOSPHATE 12.5 MILLIGRAM(S): 80 CAPSULE, EXTENDED RELEASE ORAL at 17:50

## 2022-03-02 RX ADMIN — Medication 100 MILLIGRAM(S): at 12:06

## 2022-03-02 NOTE — EEG REPORT - NS EEG TEXT BOX
REPORT OF ROUTINE EEG WITH VIDEO  [facility]: [address], [address] [place] [address], Phone: [phone] [facility]: [address] [place] [address], [place] [address], Phone: [phone] Office: [address] [place] [address], [place] [address], [place] [address], Phone: [phone]  Patient Name: LESA PABLO   Age: 46 year : 1975 MRN #: -, Location: - Referring Physician: -  EEG #: [id number] Study Date: 3/2/2022   Start Time: 10:48:55 AM    Study Duration: 23.4 		  Technical Information:					 On Instrument: - Placement and Labeling of Electrodes: The EEG was performed utilizing 20 channels referential EEG connections (coronal over temporal over parasagittal montage) using all standard 10-20 electrode placements with EKG.  Recording was at a sampling rate of 256 samples per second per channel.  Time synchronized digital video recording was done simultaneously with EEG recording.  A low light infrared camera was used for low light recording.  Josh and seizure detection algorithms were utilized. CSA Technical Component: Quantitative EEG analysis using a separate Compressed Spectral Array (CSA) software package was conducted in real-time and run at bedside after set up by the technician, digitally displaying the power of electrographic frequencies included in the 1-30Hz band using a graded color map.  This data was reviewed and interpreted independently, and is reported in a separate section below.  History:  ROUTINE STUDY  PERFORMED IN THE LAB TO R/O SEIZURE A&OX3 46 MALE WITH PMH SEIZURE ; HTN PRESENT WITH SEIZURE; AMS  NO HV DONE PHOTIC WAS PERFORMED  Medication CARVIDILOL Norvasc Dilantin (Phenytoin)  Study Interpretation:  FINDINGS:  The background was continuous, spontaneously variable and reactive.  During wakefulness, the posteriorly dominant rhythm consisted of symmetric, well modulated 8 Hz activity, with an amplitude to 30 uV, that attenuated to eye opening.    Background Slowing: Generalized slowing: theta slowing Focal slowing: none was present.  Sleep Background: Drowsiness was characterized by fragmentation, attenuation, and slowing of the background activity.   Stage II sleep transients were not recorded.  Non-epileptiform activity: None.  Epileptiform Activity:  No epileptiform discharges were present.  Events: No clinical events were recorded. No seizures were recorded.  Activation Procedures:  -Hyperventilation was not performed.   -Photic stimulation was performed and did not elicit any abnormalities.    Artifacts: Intermittent myogenic and movement artifacts were noted.  ECG: The heart rate on single channel ECG was predominantly between 70-80 BPM.  EEG Classification / Summary:  Abnormal EEG in the awake, drowsy states. -Mild generalized slowing  Clinical Impression:  Mild nonspecific diffuse or multifocal cerebral dysfunction.  No epileptiform pattern or seizure seen.  Preliminary Fellow Report, final report pending attending review  Enmanuel Arevalo MD Epilepsy Fellow  Reading Room: [phone] On Call Service After Hours: [phone]    REPORT OF ROUTINE EEG WITH VIDEO  [facility]: [address], [address] [place] [address], Phone: [phone] [facility]: [address] [place] [address], [place] [address], Phone: [phone] Office: [address] [place] [address], [place] [address], [place] [address], Phone: [phone]  Patient Name: LESA PABLO   Age: 46 year : 1975 MRN #: -, Location: - Referring Physician: -  EEG #: [id number] Study Date: 3/2/2022   Start Time: 10:48:55 AM    Study Duration: 23.4 		  Technical Information:					 On Instrument: - Placement and Labeling of Electrodes: The EEG was performed utilizing 20 channels referential EEG connections (coronal over temporal over parasagittal montage) using all standard 10-20 electrode placements with EKG.  Recording was at a sampling rate of 256 samples per second per channel.  Time synchronized digital video recording was done simultaneously with EEG recording.  A low light infrared camera was used for low light recording.  Josh and seizure detection algorithms were utilized. CSA Technical Component: Quantitative EEG analysis using a separate Compressed Spectral Array (CSA) software package was conducted in real-time and run at bedside after set up by the technician, digitally displaying the power of electrographic frequencies included in the 1-30Hz band using a graded color map.  This data was reviewed and interpreted independently, and is reported in a separate section below.  History:  ROUTINE STUDY  PERFORMED IN THE LAB TO R/O SEIZURE A&OX3 46 MALE WITH PMH SEIZURE ; HTN PRESENT WITH SEIZURE; AMS  NO HV DONE PHOTIC WAS PERFORMED  Medication CARVIDILOL Norvasc Dilantin (Phenytoin)  Study Interpretation:  FINDINGS:  The background was continuous, spontaneously variable and reactive.  During wakefulness, the posteriorly dominant rhythm consisted of symmetric, well modulated 8 Hz activity, with an amplitude to 30 uV, that attenuated to eye opening.    Background Slowing: Generalized slowing: theta slowing Focal slowing: none was present.  Sleep Background: Drowsiness was characterized by fragmentation, attenuation, and slowing of the background activity.   Stage II sleep transients were not recorded.  Non-epileptiform activity: None.  Epileptiform Activity:  No epileptiform discharges were present.  Events: No clinical events were recorded. No seizures were recorded.  Activation Procedures:  -Hyperventilation was not performed.   -Photic stimulation was performed and did not elicit any abnormalities.    Artifacts: Intermittent myogenic and movement artifacts were noted.  ECG: The heart rate on single channel ECG was predominantly between 70-80 BPM.  EEG Classification / Summary:  Abnormal EEG in the awake, drowsy states. -Mild generalized slowing  Clinical Impression:  Mild nonspecific diffuse or multifocal cerebral dysfunction.  No epileptiform pattern or seizure seen.  Enmanuel Arevalo MD Epilepsy Fellow  Nathanael Fox MD EEG/Epilepsy Attending   Reading Room: [phone] On Call Service After Hours: [phone]

## 2022-03-02 NOTE — DISCHARGE NOTE PROVIDER - NSDCMRMEDTOKEN_GEN_ALL_CORE_FT
carvedilol 12.5 mg oral tablet: 1 tab(s) orally 2 times a day  Dilantin 100 mg oral capsule: 1 tab(s) orally once a day  Dilantin 100 mg oral capsule: 2 cap(s) orally once a day (at bedtime)  magnesium oxide 400 mg oral tablet: 1 tab(s) orally once a day  Norvasc 5 mg oral tablet: 1 tab(s) orally once a day

## 2022-03-02 NOTE — DISCHARGE NOTE PROVIDER - NSDCCPCAREPLAN_GEN_ALL_CORE_FT
PRINCIPAL DISCHARGE DIAGNOSIS  Diagnosis: Seizure  Assessment and Plan of Treatment:       SECONDARY DISCHARGE DIAGNOSES  Diagnosis: Hypomagnesemia  Assessment and Plan of Treatment:     Diagnosis: Hyperkalemia  Assessment and Plan of Treatment:     Diagnosis: Benign essential HTN  Assessment and Plan of Treatment:

## 2022-03-02 NOTE — CONSULT NOTE ADULT - ASSESSMENT
46 years old male with h/o HTN, seizure disorder since childhood present to ED with complain of right sied shaking followe dby bilaterally Ue shaking more tremor like. P t states he was aware while he had bilaterally Ue shaking Pe non-focalCTH no acute findings      Impression episode of shaking, given patient states he was aware while he had bilaterally UE trembling/shaking with normal lacttae and prolactin raises the question if this was truly a seizure    -BP control  -c/w current dose of Dilantin  -Routine EEG  Can follow up with his Neurologist or with Dr. Wali Beltran at [phone]

## 2022-03-02 NOTE — CHART NOTE - NSCHARTNOTEFT_GEN_A_CORE
Work Letter     To Whom It May Concern,    Please excuse Mr Benito Bolanos from work as he has been admitted and treated at [facility]. He is medically cleared for discharge from the hospital and can return to work on 03/07/2022.   Any further questions or concerns please use contact info below.    Simon Tejada PA-C  Internal Medicine  [address] [place] [address] [phone]

## 2022-03-02 NOTE — DISCHARGE NOTE PROVIDER - CARE PROVIDER_API CALL
Wali Beltran)  Neurology  [address], [address]  [place] [address]  Phone: [phone]  Fax: [phone]  Follow Up Time:

## 2022-03-02 NOTE — DISCHARGE NOTE NURSING/CASE MANAGEMENT/SOCIAL WORK - NSDCFUADDAPPT_GEN_ALL_CORE_FT
It is important to see your primary physician as well as other necessary consultants within the next week to perform a comprehensive medical review.  Call their offices for an appointment as soon as you leave the hospital.  If you do not have a primary physician or cant reach him/her, contact the [facility] Physician Referral Service at [phone].  Your medical issues appear to be stable at this time, but if your symptoms recur or worsen, contact your physicians and/or return to the hospital if necessary.  If you encounter any issues or questions with your medication, call your physicians before stopping the medication.

## 2022-03-02 NOTE — CONSULT NOTE ADULT - SUBJECTIVE AND OBJECTIVE BOX
Neurology consult    LESA [id number]     Patient is a 46y old  Male who presents with a chief complaint of Breakthrough seizure (01 Mar 2022 14:22)      HPI:  46 years old male with h/o HTN, seizure disorder since childhood present to ED with complain of seizure. Today AM when patient woke up, he had one episode of seizure, which is right arm shaking, lasting for a few seconds (typical for his seizure). No LOC, tongue biting, loss of bowel/bladder control. Last seizure was 2 years ago. Reported some stress recently.   Hypertensive to 211/118 upon arrival, improve to 146/88 without intervention. EKG with NSR< VR 67, QTc 395. No leukocytosis. K 5.4, Cr 1.15, glucose 87, phenytoin  16.6. CT head with no acute pathology. Patient is loaded with IV keppra 1g in ED    SH: no toxic habits  FH: mother-HTN (01 Mar 2022 14:22)    Patient states he then developed bilaterally UE shaking of which he was aware  no difficulty with language.  No vision loss or double vision.  No dizziness, vertigo or new hearing loss.  . No difficulty with swallowing.  No focal weakness.  No focal sensory changes.  No numbness or tingling in the bilateral lower extremities.  No difficulty with balance.  No difficulty with ambulation.    REVIEW OF SYSTEMS:    Constitutional: No fever, chills, fatigue, weakness  Eyes: no eye pain, visual disturbances, or discharge  ENT:  No difficulty hearing, tinnitus, vertigo; No sinus or throat pain  Neck: No pain or stiffness  Respiratory: No cough, dyspnea, wheezing   Cardiovascular: No chest pain, palpitations,   Gastrointestinal: No abdominal or epigastric pain. No nausea, vomiting  No diarrhea or constipation.   Genitourinary: No dysuria, frequency, hematuria or incontinence  Neurological: No headaches, lightheadedness, vertigo, numbness or tremors  Psychiatric: No depression, anxiety, mood swings or difficulty sleeping  Musculoskeletal: No joint pain or swelling; No muscle, back or extremity pain  Skin: No itching, burning, rashes or lesions   Lymph Nodes: No enlarged glands  Endocrine: No heat or cold intolerance; No hair loss, No h/o diabetes or thyroid dysfunction  Allergy and Immunologic: No hives or eczema    MEDICATIONS    acetaminophen     Tablet .. 650 milliGRAM(s) Oral every 6 hours PRN  amLODIPine   Tablet 5 milliGRAM(s) Oral daily  carvedilol 12.5 milliGRAM(s) Oral every 12 hours  influenza   Vaccine 0.5 milliLiter(s) IntraMuscular once  LORazepam   Injectable 2 milliGRAM(s) IV Push once PRN  magnesium sulfate  IVPB 2 Gram(s) IV Intermittent every 2 hours  melatonin 3 milliGRAM(s) Oral at bedtime PRN  phenytoin   Capsule 100 milliGRAM(s) Oral daily  phenytoin   Capsule 200 milliGRAM(s) Oral at bedtime      PMH: Hypertension    Seizure Disorder         PSH: No significant past surgical history        Family history: No history of dementia, strokes, or seizures   FAMILY HISTORY:  FH: HTN (hypertension) (Mother)        SOCIAL HISTORY:  No history of tobacco or alcohol use     Allergies    No Known Allergies    Intolerances            Vital Signs Last 24 Hrs  T(C): 36.6 (02 Mar 2022 05:02), Max: 36.7 (01 Mar 2022 11:36)  T(F): 97.8 (02 Mar 2022 05:02), Max: 98 (01 Mar 2022 11:36)  HR: 62 (02 Mar 2022 05:02) (58 - 66)  BP: 136/88 (02 Mar 2022 05:02) (136/88 - 178/99)  BP(mean): --  RR: 18 (02 Mar 2022 05:02) (18 - 19)  SpO2: 100% (02 Mar 2022 05:02) (98% - 100%)      On Neurological Examination:    Mental Status - Patient is alert, awake, oriented X3. fluent, names, no dysarthria no aphasia Follows commands well and able to answer questions appropriately. Mood and affect  normal    Cranial Nerves - PERRL, EOMI, VFF, V1-V3 intact, no gross facial asymmetry, tongue/uvula midline    Motor Exam -   no drift    Sensory    Intact to light touch and pinprick bilaterally    Coord: FTN intact bilaterally     Gait - deferred         LABS:  CBC Full  -  ( 02 Mar 2022 07:44 )  WBC Count : 6.16 K/uL  RBC Count : 5.39 M/uL  Hemoglobin : 12.1 g/dL  Hematocrit : 39.8 %  Platelet Count - Automated : 265 K/uL  Mean Cell Volume : 73.8 fl  Mean Cell Hemoglobin : 22.4 pg  Mean Cell Hemoglobin Concentration : 30.4 g/dL  Auto Neutrophil # : x  Auto Lymphocyte # : x  Auto Monocyte # : x  Auto Eosinophil # : x  Auto Basophil # : x  Auto Neutrophil % : x  Auto Lymphocyte % : x  Auto Monocyte % : x  Auto Eosinophil % : x  Auto Basophil % : x      03-02    138  |  106  |  12  ----------------------------<  93  3.8   |  28  |  1.16    Ca    8.4<L>      02 Mar 2022 07:44  Phos  3.3     03-02  Mg     1.3     03-02    TPro  6.7  /  Alb  2.9<L>  /  TBili  0.3  /  DBili  x   /  AST  26  /  ALT  28  /  AlkPhos  94  03-02    LIVER FUNCTIONS - ( 02 Mar 2022 07:44 )  Alb: 2.9 g/dL / Pro: 6.7 gm/dL / ALK PHOS: 94 U/L / ALT: 28 U/L / AST: 26 U/L / GGT: x           Hemoglobin A1C:             RADIOLOGY  < from: CT Head No Cont (03.01.22 @ 13:36) >  IMPRESSION:    HEAD CT: No acute hemorrhage or midline shift.  The right parotid gland is more dense than the left, not specific in   appearance. Not significantly changed compared to 9/27/2021.  MRI with gadolinium may be helpful for further evaluation, if clinically   indicated.    --- End of Report ---    < end of copied text >  < from: CT Head No Cont (03.01.22 @ 13:36) >  IMPRESSION:    HEAD CT: No acute hemorrhage or midline shift.  The right parotid gland is more dense than the left, not specific in   appearance. Not significantly changed compared to 9/27/2021.  MRI with gadolinium may be helpful for further evaluation, if clinically   indicated.    --- End of Report ---    < end of copied text >

## 2022-03-02 NOTE — DISCHARGE NOTE NURSING/CASE MANAGEMENT/SOCIAL WORK - NSDCPEFALRISK_GEN_ALL_CORE
For information on Fall & Injury Prevention, visit: [email or web address] [place]  [email or web address] [place]  https://www.cdc.gov/steadi/patient.html

## 2022-03-02 NOTE — CONSULT NOTE ADULT - REASON FOR ADMISSION
A/P 40y s/p , PPD #2  , stable, meeting postpartum milestones   - Pain: well controlled on Ibuprofen  - GI: Tolerating regular diet, colace PRN  - : urinating without difficulty/pain  -DVT prophylaxis: ambulating frequently  -Dispo: today Breakthrough seizure

## 2022-03-02 NOTE — DISCHARGE NOTE PROVIDER - HOSPITAL COURSE
46 years old male with h/o HTN, seizure disorder since childhood present to ED with complain of seizure. Today AM when patient woke up, he had one episode of seizure, which is right arm shaking, lasting for a few seconds (typical for his seizure). No LOC, tongue biting, loss of bowel/bladder control. Last seizure was 2 years ago. Reported some stress recently.   Hypertensive to 211/118 upon arrival, improve to 146/88 without intervention. EKG with NSR< VR 67, QTc 395. No leukocytosis. K 5.4, Cr 1.15, glucose 87, phenytoin  16.6. CT head with no acute pathology. Patient is loaded with IV keppra 1g in ED. Patient states he then developed bilaterally UE shaking of which he was aware. no difficulty with language.  No vision loss or double vision.  No dizziness, vertigo or new hearing loss.  . No difficulty with swallowing.  No focal weakness.  No focal sensory changes.  No numbness or tingling in the bilateral lower extremities.  No difficulty with balance.  No difficulty with ambulation.  HEAD CT: No acute hemorrhage or midline shift. Can follow up with his Neurologist or with Dr. Wali Beltran at [phone].

## 2022-03-02 NOTE — DISCHARGE NOTE NURSING/CASE MANAGEMENT/SOCIAL WORK - PATIENT PORTAL LINK FT
You can access the FollowMyHealth Patient Portal offered by [facility] by registering at the following website: [email or web address]. By joining [employer] FollowMyHealth portal, you will also be able to view your health information using other applications (apps) compatible with our system.

## 2022-03-06 DIAGNOSIS — I10 ESSENTIAL (PRIMARY) HYPERTENSION: ICD-10-CM

## 2022-03-06 DIAGNOSIS — E83.42 HYPOMAGNESEMIA: ICD-10-CM

## 2022-03-06 DIAGNOSIS — G40.919 EPILEPSY, UNSPECIFIED, INTRACTABLE, WITHOUT STATUS EPILEPTICUS: ICD-10-CM

## 2022-03-06 DIAGNOSIS — Z82.49 FAMILY HISTORY OF ISCHEMIC HEART DISEASE AND OTHER DISEASES OF THE CIRCULATORY SYSTEM: ICD-10-CM

## 2022-03-06 DIAGNOSIS — Z79.899 OTHER LONG TERM (CURRENT) DRUG THERAPY: ICD-10-CM

## 2022-03-06 DIAGNOSIS — E87.5 HYPERKALEMIA: ICD-10-CM

## 2022-03-06 DIAGNOSIS — G40.909 EPILEPSY, UNSPECIFIED, NOT INTRACTABLE, WITHOUT STATUS EPILEPTICUS: ICD-10-CM

## 2022-03-30 NOTE — ED ADULT NURSE NOTE - IN THE PAST 12 MONTHS HAVE YOU USED DRUGS OTHER THAN THOSE REQUIRED FOR MEDICAL REASON?
Urology Clinic Follow Up Note    Isacc Martin    Reason for follow up:   Chief Complaint   Patient presents with   • Follow-up   • Prostate Problem   • Bladder Problem   • Office Visit       Date: 03/30/22    Attending Urologist: Dr. Miller MAZARIEGOS    Isacc Martin is a 84 year old male currently here for continued yearly follow-up for urinary issues and previous history of balanitis.  He is on Flomax and Cardura for BPH as well as Detrol 2 mg for urge incontinence.    He states things are ok but not great, he would like cystoscopy but is on home O2 currently.    He needs refills on his meds.    Denies any current fevers, chills, flank pain, dysuria, gross hematuria, constipation.    Denies any recent Covid exposures or symptoms.      Past Medical History:   Diagnosis Date   • AAA (abdominal aortic aneurysm) (CMS/[facility])     sees Dr Barrow   • BPH (benign prostatic hyperplasia)    • Colonoscopy refused    • COPD (chronic obstructive pulmonary disease) (CMS/[facility])     on home Oxygen 3 L/NC   • Dyslipidemia    • Glaucoma    • H/O echocardiogram 02/2022    at [facility] normal EF 60-65% no regional wall motion abnormalities. no valvular abnormalities   • Lumbar herniated disc    • Tobacco use      Past Surgical History:   Procedure Laterality Date   • Hernia repair     • Knee surgery       Family History   Problem Relation Age of Onset   • Heart Mother         pacemaker   • Other Father         lead poisoning   • COPD Father    • COPD Brother    • Coronary Artery Disease Brother         CABG   • Coronary Artery Disease Brother         CABG       ALLERGIES:   Allergen Reactions   • Ciprofloxacin Other (See Comments) and HEADACHES     Heel pain  Heel pain  Heel pain   • Ibuprofen HEADACHES and Other (See Comments)     headaches  headaches    headaches  headaches  headaches  headaches   • Methocarbamol HEADACHES and Other (See Comments)     Pt states causes headache  Pt states causes headache  Pt states causes  headache    Pt states causes headache  Pt states causes headache  Pt states causes headache  Pt states causes headache  Pt states causes headache  Pt states causes headache   • Penicillins HIVES and RASH     hives    hives  hives   • Sulindac Other (See Comments) and HEADACHES     Hypertension- increase BP  Hypertension- increase BP  Hypertension- increase BP   • Strawberry   (Food Or Med) Nausea & Vomiting   • Cyclobenzaprine Hcl HEADACHES     Pt states causes Headache  Pt states causes Headache  Pt states causes Headache   • Mg Hydroxide-Quinapril Hcl Other (See Comments)   • Naproxen HEADACHES     headaches  headaches  headaches     Social History     Socioeconomic History   • Marital status: /Civil Union     Spouse name: Not on file   • Number of children: Not on file   • Years of education: Not on file   • Highest education level: Not on file   Occupational History   • Not on file   Tobacco Use   • Smoking status: Current Every Day Smoker     Packs/day: 1.00     Years: 73.00     Pack years: 73.00     Types: Cigarettes, Pipe     Start date: 1949   • Smokeless tobacco: Never Used   • Tobacco comment: currently down to 6 per day    Substance and Sexual Activity   • Alcohol use: No     Comment: rarely   • Drug use: No   • Sexual activity: Not on file   Other Topics Concern   • Not on file   Social History Narrative   • Not on file     Social Determinants of Health     Financial Resource Strain: Not on file   Food Insecurity: Not on file   Transportation Needs: Not on file   Physical Activity: Not on file   Stress: Not on file   Social Connections: Not on file   Intimate Partner Violence: Not on file      Patient Active Problem List   Diagnosis   • AAA (abdominal aortic aneurysm) (CMS/HCC)   • Aneurysm of unspecified site (CMS/[facility])   • Chronic obstructive pulmonary disease (CMS/[facility])   • Chronic prostatitis   • Displacement of cervical intervertebral disc without myelopathy   • DJD (degenerative joint disease)  of knee   • Lumbar radiculopathy   • Lumbar degenerative disc disease   • Tobacco use disorder   • Pulmonary congestion and hypostasis   • BPH with obstruction/lower urinary tract symptoms   • Urge incontinence         ROS EXAM:   COMPREHENSIVE REVIEW OF SYSTEMS PERFORMED:  GEN: No weakness, No F/C  HEENT:  No HA, NO blurred Vision  LUNGS:  + SOB  CV:  No cp  GI:  NO N/V, NO D/C  :  See hpi  ORTHO:  + arthritis  Neuro:  No weakness, NO N/T  Psy:  No depression/anxiety  Heme/Endo:  No hx of chronic anemia, No hx of immunosuppression      All pertinent chart, labs, and imaging reviewed    PE:   CONSTITUTIONAL:     Vital Signs:     Temp Readings from Last 3 Encounters:   03/21/22 99.1 °F (37.3 °C) (Tympanic)   03/03/22 98.3 °F (36.8 °C) (Temporal)   01/10/22 98.6 °F (37 °C)           BP Readings from Last 3 Encounters:   03/21/22 128/66   03/03/22 120/64   01/10/22 104/56        Pulse Readings from Last 3 Encounters:   03/21/22 (!) 104   03/03/22 (!) 106   01/10/22 99           NAD, well developed well-nourished, on home 02    Head/face:  Atraumatic, symmetrical facial expressions  Ears/nose/mouth: oral mucosa moist  Respiratory: labored, w/o use of accessory muscles  GI/ABD: S/NT/ND / no guarding/ no organomegaly noted  :  No CVA tenderness  SKIN: no rashes noted, warm, dry  NEURO: CN2-12 grossly intact  PSYCH: appropriate affect, AO 3        Labs:    No results found for: WBC, HGB, HCT, MCV, PLT    No results found for: GLUCOSE, CALCIUM, NA, K, CO2, BUN, CREATININE    Urinalysis    UROBILINOGEN URINE (no units)   Date Value   06/04/2021 <2.0       MUCOUS (no units)   Date Value   10/21/2019 RARE       Urine Culture    No components found for: BAP, MAC, URINECLT         A/P:    BPH with obstruction/lower urinary tract symptoms  Longstanding on cardura/proscar  -PVR 82cc in office today  -Pt on home O2, not good candidate for any intervention at this time  -meds refilled  -follow up 1 year to reevaluate  symptoms    Urge incontinence  Longstanding on detrol  -PVR 82cc in office today  -Pt on home O2, not good candidate for any intervention at this time  -meds refilled  -follow up 1 year to reevaluate symptoms    Of the 30 minutes spent, face to face time with the patient includes reviewing diagnostic test, utilizing models and diagrams, discussing the plan, discussing medications and potential side effects, discussing risks and potential benefits, and obtaining informed consent. Non face to face time includes care coordination, preparation, obtaining history, reviewing medical records, reviewing diagnostic tests, referring and communicating with other healthcare professionals, documentation in electronic medical record, and placing orders. All of the time noted and activities listed were performed on the date of service, 03/30/22            Judith Bhatt DO  [facility]   Office: [phone]  Pager: [phone]    Please do not hesitate to call me with questions/concerns, thanks for allowing me to help provide care for the patient!       No

## 2022-05-10 ENCOUNTER — EMERGENCY (EMERGENCY)
Facility: HOSPITAL | Age: 47
LOS: 1 days | Discharge: ANOTHER HOSPITAL | End: 2022-05-10
Attending: STUDENT IN AN ORGANIZED HEALTH CARE EDUCATION/TRAINING PROGRAM | Admitting: STUDENT IN AN ORGANIZED HEALTH CARE EDUCATION/TRAINING PROGRAM
Payer: COMMERCIAL

## 2022-05-10 ENCOUNTER — INPATIENT (INPATIENT)
Facility: HOSPITAL | Age: 47
LOS: 1 days | Discharge: HOME | DRG: 101 | End: 2022-05-12
Attending: PSYCHIATRY & NEUROLOGY | Admitting: PSYCHIATRY & NEUROLOGY
Payer: COMMERCIAL

## 2022-05-10 VITALS
RESPIRATION RATE: 18 BRPM | OXYGEN SATURATION: 100 % | WEIGHT: 182.1 LBS | SYSTOLIC BLOOD PRESSURE: 183 MMHG | HEIGHT: 68 IN | DIASTOLIC BLOOD PRESSURE: 91 MMHG | TEMPERATURE: 99 F | HEART RATE: 56 BPM

## 2022-05-10 VITALS
TEMPERATURE: 98 F | OXYGEN SATURATION: 100 % | RESPIRATION RATE: 16 BRPM | DIASTOLIC BLOOD PRESSURE: 104 MMHG | HEART RATE: 67 BPM | HEIGHT: 68 IN | SYSTOLIC BLOOD PRESSURE: 180 MMHG

## 2022-05-10 VITALS
DIASTOLIC BLOOD PRESSURE: 105 MMHG | RESPIRATION RATE: 17 BRPM | OXYGEN SATURATION: 100 % | HEART RATE: 60 BPM | TEMPERATURE: 98 F | SYSTOLIC BLOOD PRESSURE: 165 MMHG

## 2022-05-10 DIAGNOSIS — R56.9 UNSPECIFIED CONVULSIONS: ICD-10-CM

## 2022-05-10 LAB
ALBUMIN SERPL ELPH-MCNC: 3.9 G/DL — SIGNIFICANT CHANGE UP (ref 3.3–5)
ALP SERPL-CCNC: 113 U/L — SIGNIFICANT CHANGE UP (ref 40–120)
ALT FLD-CCNC: 24 U/L — SIGNIFICANT CHANGE UP (ref 4–41)
ANION GAP SERPL CALC-SCNC: 7 MMOL/L — SIGNIFICANT CHANGE UP (ref 7–14)
ANISOCYTOSIS BLD QL: SLIGHT — SIGNIFICANT CHANGE UP
APPEARANCE UR: CLEAR — SIGNIFICANT CHANGE UP
AST SERPL-CCNC: 28 U/L — SIGNIFICANT CHANGE UP (ref 4–40)
BASE EXCESS BLDV CALC-SCNC: 2 MMOL/L — SIGNIFICANT CHANGE UP (ref -2–3)
BASOPHILS # BLD AUTO: 0 K/UL — SIGNIFICANT CHANGE UP (ref 0–0.2)
BASOPHILS NFR BLD AUTO: 0 % — SIGNIFICANT CHANGE UP (ref 0–2)
BILIRUB SERPL-MCNC: <0.2 MG/DL — SIGNIFICANT CHANGE UP (ref 0.2–1.2)
BILIRUB UR-MCNC: NEGATIVE — SIGNIFICANT CHANGE UP
BLOOD GAS VENOUS COMPREHENSIVE RESULT: SIGNIFICANT CHANGE UP
BUN SERPL-MCNC: 12 MG/DL — SIGNIFICANT CHANGE UP (ref 7–23)
CALCIUM SERPL-MCNC: 8.8 MG/DL — SIGNIFICANT CHANGE UP (ref 8.4–10.5)
CHLORIDE BLDV-SCNC: 104 MMOL/L — SIGNIFICANT CHANGE UP (ref 96–108)
CHLORIDE SERPL-SCNC: 103 MMOL/L — SIGNIFICANT CHANGE UP (ref 98–107)
CK SERPL-CCNC: 282 U/L — HIGH (ref 30–200)
CO2 BLDV-SCNC: 30.7 MMOL/L — HIGH (ref 22–26)
CO2 SERPL-SCNC: 26 MMOL/L — SIGNIFICANT CHANGE UP (ref 22–31)
COLOR SPEC: SIGNIFICANT CHANGE UP
CREAT SERPL-MCNC: 1.14 MG/DL — SIGNIFICANT CHANGE UP (ref 0.5–1.3)
DIFF PNL FLD: NEGATIVE — SIGNIFICANT CHANGE UP
EGFR: 79 ML/MIN/1.73M2 — SIGNIFICANT CHANGE UP
EOSINOPHIL # BLD AUTO: 0.05 K/UL — SIGNIFICANT CHANGE UP (ref 0–0.5)
EOSINOPHIL NFR BLD AUTO: 0.9 % — SIGNIFICANT CHANGE UP (ref 0–6)
FLUAV AG NPH QL: SIGNIFICANT CHANGE UP
FLUBV AG NPH QL: SIGNIFICANT CHANGE UP
GAS PNL BLDV: 135 MMOL/L — LOW (ref 136–145)
GIANT PLATELETS BLD QL SMEAR: PRESENT — SIGNIFICANT CHANGE UP
GLUCOSE BLDV-MCNC: 96 MG/DL — SIGNIFICANT CHANGE UP (ref 70–99)
GLUCOSE SERPL-MCNC: 98 MG/DL — SIGNIFICANT CHANGE UP (ref 70–99)
GLUCOSE UR QL: NEGATIVE — SIGNIFICANT CHANGE UP
HCO3 BLDV-SCNC: 29 MMOL/L — SIGNIFICANT CHANGE UP (ref 22–29)
HCT VFR BLD CALC: 40.7 % — SIGNIFICANT CHANGE UP (ref 39–50)
HCT VFR BLDA CALC: 38 % — LOW (ref 39–51)
HGB BLD CALC-MCNC: 12.8 G/DL — LOW (ref 13–17)
HGB BLD-MCNC: 12.6 G/DL — LOW (ref 13–17)
IANC: 2.9 K/UL — SIGNIFICANT CHANGE UP (ref 1.8–7.4)
KETONES UR-MCNC: NEGATIVE — SIGNIFICANT CHANGE UP
LACTATE BLDV-MCNC: 0.7 MMOL/L — SIGNIFICANT CHANGE UP (ref 0.5–2)
LEUKOCYTE ESTERASE UR-ACNC: NEGATIVE — SIGNIFICANT CHANGE UP
LYMPHOCYTES # BLD AUTO: 1.71 K/UL — SIGNIFICANT CHANGE UP (ref 1–3.3)
LYMPHOCYTES # BLD AUTO: 33.3 % — SIGNIFICANT CHANGE UP (ref 13–44)
MAGNESIUM SERPL-MCNC: 2.1 MG/DL — SIGNIFICANT CHANGE UP (ref 1.6–2.6)
MANUAL SMEAR VERIFICATION: SIGNIFICANT CHANGE UP
MCHC RBC-ENTMCNC: 22.6 PG — LOW (ref 27–34)
MCHC RBC-ENTMCNC: 31 GM/DL — LOW (ref 32–36)
MCV RBC AUTO: 73.1 FL — LOW (ref 80–100)
MICROCYTES BLD QL: SLIGHT — SIGNIFICANT CHANGE UP
MONOCYTES # BLD AUTO: 0.43 K/UL — SIGNIFICANT CHANGE UP (ref 0–0.9)
MONOCYTES NFR BLD AUTO: 8.4 % — SIGNIFICANT CHANGE UP (ref 2–14)
NEUTROPHILS # BLD AUTO: 2.9 K/UL — SIGNIFICANT CHANGE UP (ref 1.8–7.4)
NEUTROPHILS NFR BLD AUTO: 56.5 % — SIGNIFICANT CHANGE UP (ref 43–77)
NITRITE UR-MCNC: NEGATIVE — SIGNIFICANT CHANGE UP
PCO2 BLDV: 55 MMHG — SIGNIFICANT CHANGE UP (ref 42–55)
PH BLDV: 7.33 — SIGNIFICANT CHANGE UP (ref 7.32–7.43)
PH UR: 7 — SIGNIFICANT CHANGE UP (ref 5–8)
PHENYTOIN FREE SERPL-MCNC: 5.3 UG/ML — LOW (ref 10–20)
PHOSPHATE SERPL-MCNC: 3.5 MG/DL — SIGNIFICANT CHANGE UP (ref 2.5–4.5)
PLAT MORPH BLD: NORMAL — SIGNIFICANT CHANGE UP
PLATELET # BLD AUTO: 263 K/UL — SIGNIFICANT CHANGE UP (ref 150–400)
PLATELET COUNT - ESTIMATE: NORMAL — SIGNIFICANT CHANGE UP
PO2 BLDV: 39 MMHG — SIGNIFICANT CHANGE UP
POTASSIUM BLDV-SCNC: 3.9 MMOL/L — SIGNIFICANT CHANGE UP (ref 3.5–5.1)
POTASSIUM SERPL-MCNC: 4.4 MMOL/L — SIGNIFICANT CHANGE UP (ref 3.5–5.3)
POTASSIUM SERPL-SCNC: 4.4 MMOL/L — SIGNIFICANT CHANGE UP (ref 3.5–5.3)
PROT SERPL-MCNC: 7.2 G/DL — SIGNIFICANT CHANGE UP (ref 6–8.3)
PROT UR-MCNC: ABNORMAL
RBC # BLD: 5.57 M/UL — SIGNIFICANT CHANGE UP (ref 4.2–5.8)
RBC # FLD: 15.2 % — HIGH (ref 10.3–14.5)
RBC BLD AUTO: NORMAL — SIGNIFICANT CHANGE UP
RSV RNA NPH QL NAA+NON-PROBE: SIGNIFICANT CHANGE UP
SAO2 % BLDV: 63.8 % — SIGNIFICANT CHANGE UP
SARS-COV-2 RNA SPEC QL NAA+PROBE: SIGNIFICANT CHANGE UP
SMUDGE CELLS # BLD: PRESENT — SIGNIFICANT CHANGE UP
SODIUM SERPL-SCNC: 136 MMOL/L — SIGNIFICANT CHANGE UP (ref 135–145)
SP GR SPEC: 1.02 — SIGNIFICANT CHANGE UP (ref 1–1.05)
UROBILINOGEN FLD QL: SIGNIFICANT CHANGE UP
VARIANT LYMPHS # BLD: 0.9 % — SIGNIFICANT CHANGE UP (ref 0–6)
WBC # BLD: 5.13 K/UL — SIGNIFICANT CHANGE UP (ref 3.8–10.5)
WBC # FLD AUTO: 5.13 K/UL — SIGNIFICANT CHANGE UP (ref 3.8–10.5)

## 2022-05-10 PROCEDURE — 93010 ELECTROCARDIOGRAM REPORT: CPT

## 2022-05-10 PROCEDURE — 71045 X-RAY EXAM CHEST 1 VIEW: CPT | Mod: 26

## 2022-05-10 PROCEDURE — 95718 EEG PHYS/QHP 2-12 HR W/VEEG: CPT

## 2022-05-10 PROCEDURE — 99285 EMERGENCY DEPT VISIT HI MDM: CPT

## 2022-05-10 PROCEDURE — 99285 EMERGENCY DEPT VISIT HI MDM: CPT | Mod: 25

## 2022-05-10 RX ORDER — VALPROIC ACID (AS SODIUM SALT) 250 MG/5ML
1000 SOLUTION, ORAL ORAL ONCE
Refills: 0 | Status: DISCONTINUED | OUTPATIENT
Start: 2022-05-10 | End: 2022-05-12

## 2022-05-10 RX ORDER — FAMOTIDINE 10 MG/ML
20 INJECTION INTRAVENOUS ONCE
Refills: 0 | Status: COMPLETED | OUTPATIENT
Start: 2022-05-10 | End: 2022-05-10

## 2022-05-10 RX ORDER — LOSARTAN POTASSIUM 100 MG/1
100 TABLET, FILM COATED ORAL DAILY
Refills: 0 | Status: DISCONTINUED | OUTPATIENT
Start: 2022-05-10 | End: 2022-05-12

## 2022-05-10 RX ORDER — ENOXAPARIN SODIUM 100 MG/ML
40 INJECTION SUBCUTANEOUS EVERY 24 HOURS
Refills: 0 | Status: DISCONTINUED | OUTPATIENT
Start: 2022-05-10 | End: 2022-05-12

## 2022-05-10 RX ORDER — HYDRALAZINE HCL 50 MG
5 TABLET ORAL ONCE
Refills: 0 | Status: COMPLETED | OUTPATIENT
Start: 2022-05-10 | End: 2022-05-10

## 2022-05-10 RX ORDER — CARVEDILOL PHOSPHATE 80 MG/1
12.5 CAPSULE, EXTENDED RELEASE ORAL EVERY 12 HOURS
Refills: 0 | Status: DISCONTINUED | OUTPATIENT
Start: 2022-05-10 | End: 2022-05-12

## 2022-05-10 RX ADMIN — LOSARTAN POTASSIUM 100 MILLIGRAM(S): 100 TABLET, FILM COATED ORAL at 16:58

## 2022-05-10 RX ADMIN — Medication 100 MILLIGRAM(S): at 16:58

## 2022-05-10 RX ADMIN — FAMOTIDINE 20 MILLIGRAM(S): 10 INJECTION INTRAVENOUS at 16:57

## 2022-05-10 RX ADMIN — Medication 5 MILLIGRAM(S): at 21:53

## 2022-05-10 RX ADMIN — CARVEDILOL PHOSPHATE 12.5 MILLIGRAM(S): 80 CAPSULE, EXTENDED RELEASE ORAL at 19:12

## 2022-05-10 RX ADMIN — Medication 30 MILLILITER(S): at 16:57

## 2022-05-10 NOTE — CONSULT NOTE ADULT - ATTENDING COMMENTS
First seizure at age 12 yo.  Seizures not controlled on phenytoin with fluctuating levels.   Tx to [facility] for EMU admission for characterization of epilepsy and taper off phenytoin and treat with more effective AED with better side effect profile.   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     Thank you

## 2022-05-10 NOTE — ED PROVIDER NOTE - OBJECTIVE STATEMENT
48 yo male with pmh htn, epilepsy since childhood, transfer from [facility] for c/f seizure activity today. Pt has 7 min long seizure while in bed witnessed by mother, on dilantin daily. Denies lip/tongue biting, no incontinence, no recent fevers, chills, nausea, vomiting, abdominal pain diarrhea, constipation.

## 2022-05-10 NOTE — ED ADULT NURSE NOTE - WILL THE PATIENT ACCEPT THE PFIZER COVID-19 VACCINE IF ELIGIBLE AND IT IS AVAILABLE?
crackles or wheezing  CARDIOVASCULAR:  Regular rate 72 and rhythm with no murmurs, gallops, rubs, or abnormal heart sounds, normal PMI. The apical impulses not displaced  JVP less than 8 cm H2O  Heart tones are crisp and normal  Cervical veins are not engorged  The carotid upstroke is normal in amplitude and contour without delay or bruit  JVP is not elevated  ABDOMEN:  Normal bowel sounds, non-distended and non-tender to palpation  EXT: No edema, no calf tenderness. Pulses are present bilaterally. DATA:    Radiology Review:  Pertinent images / reports were reviewed as a part of this visit and reveals the following:    Device interrogation : 1/8/18: All sensing and pacing parameters are within normal range    Assessment:     1. Ischemic cardiomyopathy   ~Global left ventricular function is mildly decreased with ejection fraction estimated from 40 % to 45 %; Distal septal and apical hypokinesis noted. ~BB   ~s/p ICD placement for secondary prevention (hx VF and NSVT Oct '17)    2. Coronary artery disease involving coronary bypass graft of native heart without angina pectoris   ~continues to c/o angina (description is somewhat confusing: has nausea / dizziness vs sharp CPs)  ~uses NTG SL prn. Continues to take Imdur / Plavix / BB  ~Dr Joel Maurice had reviewed his films and felt would best to be performed at a high risk  Program: [facility] as the closest program     3. Essential hypertension   ~well controlled to low normal at times    4. Mixed hyperlipidemia   ~HDL near goal otherwise controlled on atorvastatin     5. Paroxysmal atrial fibrillation (HCC)   ~states to be able to feel going in/out of rhythm   ~warfarin managed in coumadin clinic           I had the opportunity to review the clinical symptoms and presentation of Darline Mathis. Plan:     1. Continue present management   ~will be seeing Dr. Dao Fulton in the near future and hopes to be referred to [facility]   2.  F/U in three months No

## 2022-05-10 NOTE — ED ADULT TRIAGE NOTE - CHIEF COMPLAINT QUOTE
seizure this am, transferred from [facility] for neuro consult and FRANKO monitoring

## 2022-05-10 NOTE — ED PROVIDER NOTE - OBJECTIVE STATEMENT
Shon Mccullough, PGY-2- 47 year old male with a pmhx of HTN, seizure disorder since childhood present to ED with complain of seizure, on Dilantin 100 mg AM and Dilantin 200 mg PM, presents to ED for evaluation of seizure-like activity. Pt reports rt arm and then full body shaking while he was laying in bed. Reports sxs lasted a few seconds. No LOC, urinary/bowel incontinence, tongue biting. Reports he took his Dilantin after this occurred. Had a coworker test positive for covid-19 yesterday and reports scratchy throat. No fever, chills, cp, sob, cough, vomiting, diarrhea, dysuria, headache. Not vaccinated for COVID-19. Pt unsure of his neurologist's name but states it is within the system.

## 2022-05-10 NOTE — ED PROVIDER NOTE - PROGRESS NOTE DETAILS
Shon Mccullough, PGY-2- Work up in ED negative. Patient seen and evaluated by neurology, recommending transfer to ED for EMU. Accepted to EMU by Dr. Bundy, unit attempting to make bed for patient. Will be transferred to [facility] ED. Patient signed consent and tier 2 transfer initiated. Per neuro, recommended rescue meds 1 mg Ativan x1 and 2nd line would be Vimpat 100 mg IVP. COVID-19 negative. Risks/benefits of transfer discussed with patient and he expressed understanding. ED accepting Dr. Santos.

## 2022-05-10 NOTE — ED ADULT NURSE NOTE - CHIEF COMPLAINT QUOTE
hx of seizures on Dilantin. Pt had witnessed seizure this morning by brother. Pt states his whole body was shaking. C/o tingling to right side of body which pt states he feels that after a seizure. Pt also c/o abdominal pain with diarrhea x 1 day. No arm drift, facial droop or slurred speech. Pt c/o scratchy itchy throat, states was exposed to coworker last few days who was COVID positive.   VP=[id number]

## 2022-05-10 NOTE — ED PROVIDER NOTE - CLINICAL SUMMARY MEDICAL DECISION MAKING FREE TEXT BOX
Adult male w james d/o transferred from [facility] for admit to EMU.   Smith Judge MD, Facep

## 2022-05-10 NOTE — ED ADULT TRIAGE NOTE - CHIEF COMPLAINT QUOTE
hx of seizures on Dilantin. Pt had witnessed seizure this morning by brother. Pt states his whole body was shaking. C/o tingling to right side of body which pt states he feels that after a seizure. Pt also c/o abdominal pain with diarrhea x 1 day. No arm drift, facial droop or slurred speech.  [id number] hx of seizures on Dilantin. Pt had witnessed seizure this morning by brother. Pt states his whole body was shaking. C/o tingling to right side of body which pt states he feels that after a seizure. Pt also c/o abdominal pain with diarrhea x 1 day. No arm drift, facial droop or slurred speech. Pt c/o scratchy itchy throat, states was exposed to coworker last few days who was COVID positive.   FX=[id number]

## 2022-05-10 NOTE — ED ADULT NURSE NOTE - CHIEF COMPLAINT QUOTE
seizure this am, transferred from [facility] for neuro consult and FRANKO monitoring

## 2022-05-10 NOTE — ED PROVIDER NOTE - DOMESTIC TRAVEL HIGH RISK QUESTION
Medication(s) Requested: klonopin  Last office visit: 08/11/17  Last refill: 9/19/17  Is the patient due for refill of this medication(s): Yes  PDMP review: Criteria met. Prescription printed for Physician/KATE signature.  Med called in         No

## 2022-05-10 NOTE — CONSULT NOTE ADULT - ASSESSMENT
INCOMPLETE NOTE Assessment: 46 yo RH male with a PMHx of epilepsy (since childhood) and HTN who presents to the ED following a seizure-like episode at home. Patient states he experienced an episode of whole-body shaking this morning that lasted for ~4 minutes. He retained consciousness throughout the entire event, denies tongue bite/urinary incontinence. He now takes Dilantin 100MG QAM/200MG QPM. He reports he is compliant with taking this medication and has not missed any doses. Labs notable for mildly elevated CPK (282) and subtherapeutic Dilantin level (5.3). No elevated lactate or leukocytosis.    Impression: INCOMPLETE NOTE. Assessment: 48 yo RH male with a PMHx of epilepsy (since childhood) and HTN who presents to the ED following a seizure-like episode at home. Patient states he experienced an episode of whole-body shaking this morning that lasted for ~4 minutes. He retained consciousness throughout the entire event, denies tongue bite/urinary incontinence. He now takes Dilantin 100MG QAM/200MG QPM. He reports he is compliant with taking this medication and has not missed any doses. Labs notable for mildly elevated CPK (282) and subtherapeutic Dilantin level (5.3). No elevated lactate or leukocytosis.    Impression: Epilepsy disorder since childhood, unspecified. Presenting after concern for breakthrough seizure. Patient has had several episodes of breakthrough seizure over the past few months.    Recommendations:  [] Transfer to [facility] ED, to be admitted to EMU under Dr. Donita Bundy.  [] Hold home Dilantin.  [] Give IV Fluid NS 1L.  [] Seizure Rescue: Ativan 1MG IVP x1 for convulsions/GTCs OR focal seizures associated with severe vital sign derangements lasting > 3 minutes. Vimpat 100MG IVP x1 for convulsions/GTCs OR focal seizures associated with severe vital sign derangements that are REFRACTORY to Ativan.    Case seen and discussed with neurology attending Dr. Gu. Case also discussed with epilepsy attending Dr. Bundy.

## 2022-05-10 NOTE — PATIENT PROFILE ADULT - FALL HARM RISK - UNIVERSAL INTERVENTIONS
Bed in lowest position, wheels locked, appropriate side rails in place/Call bell, personal items and telephone in reach/Instruct patient to call for assistance before getting out of bed or chair/Non-slip footwear when patient is out of bed/[place] to call system/Physically safe environment - no spills, clutter or unnecessary equipment/Purposeful Proactive Rounding/Room/bathroom lighting operational, light cord in reach

## 2022-05-10 NOTE — ED ADULT NURSE NOTE - NSIMPLEMENTINTERV_GEN_ALL_ED
Implemented All Universal Safety Interventions:  [place] to call system. Call bell, personal items and telephone within reach. Instruct patient to call for assistance. Room bathroom lighting operational. Non-slip footwear when patient is off stretcher. Physically safe environment: no spills, clutter or unnecessary equipment. Stretcher in lowest position, wheels locked, appropriate side rails in place.
FEVER

## 2022-05-10 NOTE — H&P ADULT - HISTORY OF PRESENT ILLNESS
47 y.o. RH M with PMH of epilepsy (since childhood) and HTN presented to [facility] ED on 5/10 due to seizure like activity. Per pt, had trouble sleeping so was up in bed ~04:43 hr on 5/10. Then he started to become rigid in all extremities and entire body shook for about 7 minutes. Pt was able to recollect what was going on at that time, and does not think he lost consciousness. Pt did not note any tongue biting or urinary incontinence. Similar episode happened in 4/2022 and was also able to remember what was going on. 3 years ago, had similar episode however reported loss of consciousness and tongue bite while at work. Has persistent hx of seizures since youth, starting 12 years af age. Usually had an aura where he had b/l feet warmth that climb up to the b/l knees then shaking started. Usually loses consciousness and often fell to the ground in the past.  Pt's outpt neurologist is Dr. David who had increased Dilantin dose from 200 mg BID to Dilantin 100 mg 1 capsule in the AM and 2 capsules at night time in 12/2021. Currently denies any headache, double vision, blurry vision, n/v, pain/tingling/weakness in all extremities. Has muscle pain in chest, lower back, and up the spine intermittently.  47 y.o. RH M with PMH of epilepsy (since childhood) and HTN presented to [facility] ED on 5/10 due to seizure like activity. Per pt, had trouble sleeping so was up in bed ~04:43 hr on 5/10. Then he started to become rigid in all extremities and entire body shook for about 7 minutes. Pt was able to recollect what was going on at that time, and does not think he lost consciousness. Pt did not note any tongue biting or urinary incontinence. Similar episode happened in 4/2022 and was also able to remember what was going on. 3 years ago, had similar episode however reported loss of consciousness and tongue bite while at work. Has persistent hx of seizures since youth, starting 12 years af age. Usually had an aura where he had b/l feet warmth that climb up to the b/l knees then shaking started. Usually loses consciousness and often fell to the ground in the past.  Pt's outpt neurologist is Dr. David who had increased Dilantin dose from 200 mg BID to Dilantin 100 mg 1 capsule in the AM and 2 capsules at night time in 12/2021. Currently denies any headache, double vision, blurry vision, n/v, pain/tingling/weakness in all extremities. Has muscle pain in chest, lower back, and up the spine intermittently.     Obtained medication reconciliation from [facility] at [address] [place] [address]  - Olmesartan 40 mg qd  - Carvedilol 12.5 mg bid  - Phenytoin 100 mg 1 capsule AM and 2 capsules AM  - Pt filled BP meds last in 3/2022 and dilantin 2/2022  LOIDA MR [id number]    47 y.o. RH M with PMH of epilepsy (since childhood) and HTN presented to [facility] ED on 5/10 due to seizure like activity. Per pt, had trouble sleeping so was up in bed ~04:43 hr on 5/10. Then he started to become rigid in all extremities and entire body shook for about 7 minutes. Pt was able to recollect what was going on at that time, and does not think he lost consciousness. Pt did not note any tongue biting or urinary incontinence. Similar episode happened in 4/2022 and was also able to remember what was going on. 3 years ago, had similar episode however reported loss of consciousness and tongue bite while at work. Has persistent hx of seizures since youth, starting 12 years af age. Usually had an aura where he had b/l feet warmth that climb up to the b/l knees then shaking started. Usually loses consciousness and often fell to the ground in the past.  Pt's outpt neurologist is Dr. David who had increased Dilantin dose from 200 mg BID to Dilantin 100 mg 1 capsule in the AM and 2 capsules at night time in 12/2021. Currently denies any headache, double vision, blurry vision, n/v, pain/tingling/weakness in all extremities. Has muscle pain in chest, lower back, and up the spine intermittently.     Obtained medication reconciliation from [facility] at [address] [place] [address]  - Olmesartan 40 mg qd  - Carvedilol 12.5 mg bid  - Phenytoin 100 mg 1 capsule AM and 2 capsules AM  - Pt filled BP meds last in 3/2022 and dilantin 2/2022

## 2022-05-10 NOTE — ED ADULT NURSE NOTE - OBJECTIVE STATEMENT
pt received to 24, aox4.  pt had a witnessed seisure by family.  pt states "I had a headache before my seizure"  pt says his headache is better now than before the seizure.  pt is concerned he may have covid because he was "exposed to covid" by his friend.  pt appears in NAD.  v/s a snoted.  SL placed, labs sent awaiting further orders

## 2022-05-10 NOTE — ED PROVIDER NOTE - ATTENDING APP SHARED VISIT CONTRIBUTION OF CARE
PCP transfer for neuro admit  47y male pmh Seizurei d/o on dilantin pw c/o gen tonic clonic seizure todahy. No trauma, fc/sob/abd pain nv, visuaol/hearing changes. PE WDWN feamle awake alert alert ncat neck suppe chest clear ap cv no rgm abd soft +bs no mass guarding neuro gcs 15 speech fluent power 5/5 all extr   Smith Judge MD, Facep

## 2022-05-10 NOTE — H&P ADULT - ASSESSMENT
47 y.o. RH M with PMH of epilepsy (since childhood) and HTN presented to [facility] ED on 5/10 due to seizure like activity. Most recent episode happened ~04:43 hr on 5/10 involved generalized body shaking without impaired awareness. Reports seizure episodes date back to 12 years of age, involved GTC with impaired awareness and tongue biting. Recent dosage of Dilantin changed to 100 mg 1 capsule in AM and 2 capsules qhs per Dr David, outpt neurologist, in 12/2021. Neuro exam non-focal. At [facility], labs significant for  and subtherapeutic dilantin level 5.3. No elevated leukocytosis or lactate    Semiology: Aura with sensation of warmth from b/l feet to the knees. Had b/l extremities shaking without urinary incontinence/tongue bite and without impaired awareness in 4-5/2022. In the past, there had been GTC with impaired awareness. Post-ictal word finding difficulty    Impression: Breakthrough seizure of unclear etiology. Has hx of epilepsy since past    Plan:  [] admit to EMU  [] vEEG 24 hrs  [] Will do phenytoin 100 mg IVPB BID  [] Will continue fluids  [] CBC, CMP, Mg, P, CK, UA, Utox, ammonia  [] Neurochecka nd vital per unit protocl  [] DVT PPx with Lovenox 40mg subq daily  [] If patient has a convulsion, please document accurately the length of the episode, and specifically what the patient was doing paying attention to eye opening vs closure, gaze deviation, shaking of extremities, tongue bite, urinary incontinence, any derangement of vital signs  [] If patient has a generalized tonic clonic episode for >3 minutes or significant derangement of vital signs, may administer Ativan 1 mg IV and please notify provider  [] If GTC > 3 min and refractory to Ativan, please administer VPA 1 g IV x1 and notify provider  [] Advise patient to not drive, operate heavy machinery, avoid heights, pools, bathtubs, locked doors.  [] Will f/u outpatient upon discharge with epilepsy neurology or c/w follow up with Dr. David    Case to be seen in AM with EMU attending 47 y.o. RH M with PMH of epilepsy (since childhood) and HTN presented to [facility] ED on 5/10 due to seizure like activity. Most recent episode happened ~04:43 hr on 5/10 involved generalized body shaking without impaired awareness. Reports seizure episodes date back to 12 years of age, involved GTC with impaired awareness and tongue biting. Recent dosage of Dilantin changed to 100 mg 1 capsule in AM and 2 capsules qhs per Dr David, outpt neurologist, in 12/2021. Neuro exam non-focal. At [facility], labs significant for  and subtherapeutic dilantin level 5.3. No elevated leukocytosis or lactate    Semiology: Aura with sensation of warmth from b/l feet to the knees. Had b/l extremities shaking without urinary incontinence/tongue bite and without impaired awareness in 4-5/2022. In the past, there had been GTC with impaired awareness. Post-ictal word finding difficulty    Impression: Breakthrough seizure of unclear etiology. Has hx of epilepsy since past    Plan:  [] Admit to EMU  [] vEEG 24 hrs  [] Will do phenytoin 100 mg PO BID since able to swallow  [] Will continue fluids once BP meds resumed and BP lower  [] CBC, CMP, Mg, P, CK  [] Neurochecka nd vital per unit protocl  [] DVT PPx with Lovenox 40mg subq daily  [] If patient has a convulsion, please document accurately the length of the episode, and specifically what the patient was doing paying attention to eye opening vs closure, gaze deviation, shaking of extremities, tongue bite, urinary incontinence, any derangement of vital signs  [] If patient has a generalized tonic clonic episode for >3 minutes or significant derangement of vital signs, may administer Ativan 1 mg IV and please notify provider  [] If GTC > 3 min and refractory to Ativan, please administer VPA 1 g IV x1 and notify provider  [] Advise patient to not drive, operate heavy machinery, avoid heights, pools, bathtubs, locked doors.  [] Will f/u outpatient upon discharge with epilepsy neurology or c/w follow up with Dr. David    Case to be seen in AM with EMU attending 47 y.o. RH M with PMH of epilepsy (since childhood) and HTN presented to [facility] ED on 5/10 due to seizure like activity. Most recent episode happened ~04:43 hr on 5/10 involved generalized body shaking without impaired awareness. Reports seizure episodes date back to 12 years of age, involved GTC with impaired awareness and tongue biting. Recent dosage of Dilantin changed to 100 mg 1 capsule in AM and 2 capsules qhs per Dr David, outpt neurologist, in 12/2021. Neuro exam non-focal. At [facility], labs significant for  and subtherapeutic dilantin level 5.3. No elevated leukocytosis or lactate. UA/Utox neg. RVP including COVID neg.    Semiology: Aura with sensation of warmth from b/l feet to the knees. Had b/l extremities shaking without urinary incontinence/tongue bite and without impaired awareness in 4-5/2022. In the past, there had been GTC with impaired awareness. Post-ictal word finding difficulty    Impression: Breakthrough seizure of unclear etiology. Has hx of epilepsy since past    Plan:  [] Admit to EMU  [] vEEG 24 hrs  [] Will do phenytoin 100 mg PO BID since able to swallow  [] Will continue fluids once BP meds resumed and BP lower  [] CBC, CMP, Mg, P, CK  [] Neurochecka nd vital per unit protocl  [] DVT PPx with Lovenox 40mg subq daily  [] If patient has a convulsion, please document accurately the length of the episode, and specifically what the patient was doing paying attention to eye opening vs closure, gaze deviation, shaking of extremities, tongue bite, urinary incontinence, any derangement of vital signs  [] If patient has a generalized tonic clonic episode for >3 minutes or significant derangement of vital signs, may administer Ativan 1 mg IV and please notify provider  [] If GTC > 3 min and refractory to Ativan, please administer VPA 1 g IV x1 and notify provider  [] Advise patient to not drive, operate heavy machinery, avoid heights, pools, bathtubs, locked doors.  [] Will f/u outpatient upon discharge with epilepsy neurology or c/w follow up with Dr. David    Case to be seen in AM with EMU attending

## 2022-05-10 NOTE — CONSULT NOTE ADULT - SUBJECTIVE AND OBJECTIVE BOX
LESA PABLO  Male  MRN-[id number]    HPI: INCOMPLETE NOTE.    ROS: All negative except as mentioned in HPI.    PAST MEDICAL & SURGICAL HISTORY:  Hypertension    Seizure Disorder    FAMILY HISTORY:  No family history of seizure.    SOCIAL HISTORY:  Nonsmoker.    Allergies    No Known Allergies    Vital Signs Last 24 Hrs  T(C): 36.8 (10 May 2022 09:03), Max: 36.8 (10 May 2022 09:03)  T(F): 98.3 (10 May 2022 09:03), Max: 98.3 (10 May 2022 09:03)  HR: 55 (10 May 2022 09:57) (55 - 67)  BP: 176/110 (10 May 2022 09:57) (176/110 - 180/104)  RR: 18 (10 May 2022 09:57) (16 - 18)  SpO2: 100% (10 May 2022 09:57) (100% - 100%)    General Exam:  Constitutional:  Head:  Extremities:    Neuro    LABS:               12.6   5.13  )-----------( 263      ( 10 May 2022 09:50 )             40.7     05-10    136  |  103  |  12  ----------------------------<  98  4.4   |  26  |  1.14    Ca    8.8      10 May 2022 09:50  Phos  3.5     05-10  Mg     2.10     05-10    TPro  7.2  /  Alb  3.9  /  TBili  <0.2  /  DBili  x   /  AST  28  /  ALT  24  /  AlkPhos  113  05-10         LESA PABLO  Male  MRN-[id number]    HPI: 46 yo RH male with a PMHx of epilepsy (since childhood) and HTN who presents to the ED following a seizure-like episode at home. Patient states he experienced an episode of whole-body shaking this morning that lasted for ~4 minutes. He retained consciousness throughout the entire event, denies tongue bite/urinary incontinence. Patient states the shaking did not begin on one side of his body and then progress to both sides, it initiated as whole-body shaking from the beginning. Patient follows with neurologist Dr. Dhruv David outpatient. He notes Dr. David decreased his Dilantin dosing in 12/2021 because his Dilantin level was too high on his prior dosing of Dilantin 200MG BID. He now takes Dilantin 100MG QAM/200MG QPM. He reports he is compliant with taking this medication and has not missed any doses. Patient is unclear about when his last seizure occurred. He initially states his last seizure was 2 years ago, but then when reminded he was seen at [facility] from 3/1-3/2 for possible breakthrough seizure he agrees this was likely his last seizure prior to this morning. Labs notable for mildly elevated CPK (282) and subtherapeutic Dilantin level (5.3). No elevated lactate or leukocytosis. Patient denies HA, dizziness, vision changes, speech changes, numbness/tingling, weakness.    Seizure semiology: Typically has aura manifesting as sensation of heaviness in the feet that rises to the knees. Alternatively can have mild shaking in both arms. Then will have either:  1. Shaking in the RUE/RLE without loss of awareness that does not progress, or  2. GTC (with loss of awareness)    ROS: All negative except as mentioned in HPI.    PAST MEDICAL & SURGICAL HISTORY:  Hypertension    Seizure Disorder    FAMILY HISTORY:  No family history of seizure.    SOCIAL HISTORY:  Nonsmoker.    Allergies    No Known Allergies    Vital Signs Last 24 Hrs  T(C): 36.8 (10 May 2022 09:03), Max: 36.8 (10 May 2022 09:03)  T(F): 98.3 (10 May 2022 09:03), Max: 98.3 (10 May 2022 09:03)  HR: 55 (10 May 2022 09:57) (55 - 67)  BP: 176/110 (10 May 2022 09:57) (176/110 - 180/104)  RR: 18 (10 May 2022 09:57) (16 - 18)  SpO2: 100% (10 May 2022 09:57) (100% - 100%)    General Exam:  Constitutional: Lying on stretcher, NAD.  Head: Normocephalic, atraumatic.  Extremities: No edema.    Neuro Exam:   MS: Alert, eyes open spontaneously. Oriented to self, age, location, month, year. Speech is fluent, not slurred. Follows commands.  CN: PERRL. VFF. EOMI. V1-V3 intact. Face symmetric. Tongue midline.   Motor: All extremities antigravity without drift.   Sensory: Intact to LT throughout. No extinction.  Reflexes: 1+ throughout. Babinski absent bilaterally.   Coordination: No dysmetria on FNF or on HTS bilaterally.  Gait: Deferred.    LABS:               12.6   5.13  )-----------( 263      ( 10 May 2022 09:50 )             40.7     05-10    136  |  103  |  12  ----------------------------<  98  4.4   |  26  |  1.14    Ca    8.8      10 May 2022 09:50  Phos  3.5     05-10  Mg     2.10     05-10    TPro  7.2  /  Alb  3.9  /  TBili  <0.2  /  DBili  x   /  AST  28  /  ALT  24  /  AlkPhos  113  05-10

## 2022-05-10 NOTE — H&P ADULT - NSHPPHYSICALEXAM_GEN_ALL_CORE
MS: Alert, awake, oriented to self, place, situation, and time. Follows all directions  Language: No aphasia, no dysarthria. Repetition and comprehension intact  CN: PERRL (3mm b/l). VFF. EOMI. No diplopia or nystagmus. No facial asymmetry. V1-3 sensation intact b/l. Tongue midline without tongue bite annette  Motor: 5/5 strength throughout b/l UE and LE. No drift in all extremities  Sensation: Intact to light touch throughout b/l  Reflexes: 1 throughout b/l biceps, BR, patellar, and achilles  Gait: No gait imbalance

## 2022-05-10 NOTE — ED PROVIDER NOTE - PHYSICAL EXAMINATION
General: well appearing, no acute distress, AOx3  Skin: no rash, no pallor  Head: normocephalic, atraumatic  Eyes: clear conjunctiva, EOMI, PERRL   ENMT: airway patent, no nasal discharge, no oropharyngeal erythema or exudate   Cardiovascular: normal rate, normal rhythm, S1/S2  Pulmonary: clear to auscultation bilaterally, no rales, rhonchi, or wheeze  Abdomen: soft, nontender  Musculoskeletal: moving extremities well, no deformity  Neuro: CN II-XII grossly intact, 5/5 strength b/l upper and lower extremities, normal speech   Psych: normal mood, normal affect

## 2022-05-10 NOTE — ED PROVIDER NOTE - ATTENDING CONTRIBUTION TO CARE
47 year old male with a pmhx of HTN, seizure disorder since childhood present to ED with complain of seizure, on Dilantin 100 mg AM and Dilantin 200 mg PM, presents to ED for evaluation of seizure-like activity. Pt reports rt arm and then full body shaking while he was laying in bed. Reports sxs lasted a few seconds. No LOC, urinary/bowel incontinence, tongue biting. Reports he took his Dilantin after this occurred. Had a coworker test positive for covid-19 yesterday and reports scratchy throat. No fever, chills, cp, sob, cough, vomiting, diarrhea, dysuria, headache. Not vaccinated for COVID-19. Pt unsure of his neurologist's name but states it is within the system.  on exam well appearing, no acute comlpaints, neuro intact, will check labs/lytes, dw neuro re dosage changes?

## 2022-05-10 NOTE — ED PROVIDER NOTE - PHYSICAL EXAMINATION
A&Ox3, NAD, well appearing  NCAT. PERRL, EOMI.  Neck supple, no LAD.   Lungs CTAB. No w/r/r  Cardiac +S1S2, RRR, No m/r/g.   Abd soft, NT/ND, +BS, no rebound or guarding.   Extremities: cap refill <2, pulses in distal extremities 4+, no edema.   Skin without rash.   No focal Defecits, Strength 5/5 UE/LE. Gait steady.

## 2022-05-10 NOTE — ED PROVIDER NOTE - CLINICAL SUMMARY MEDICAL DECISION MAKING FREE TEXT BOX
Shon Mccullough, PGY-2- 47 years old male with h/o HTN, seizure disorder since childhood present to ED with complain of seizure-like activity that occurred this morning. Pt reports rt arm then full body shaking shortly before he was due for his Dilantin. Pt took Dilantin afterwards. On arrival, vitals significant for HTN, which is pt's usual BP. Exam unremarkable, pt well-appearing, ambulatory. Pt concerned for possible COVID-19 infection. Will check electrolytes, infectious work up, likely dc with neuro follow up

## 2022-05-11 ENCOUNTER — TRANSCRIPTION ENCOUNTER (OUTPATIENT)
Age: 47
End: 2022-05-11

## 2022-05-11 LAB
ANION GAP SERPL CALC-SCNC: 11 MMOL/L — SIGNIFICANT CHANGE UP (ref 5–17)
BUN SERPL-MCNC: 16 MG/DL — SIGNIFICANT CHANGE UP (ref 7–23)
CALCIUM SERPL-MCNC: 9.4 MG/DL — SIGNIFICANT CHANGE UP (ref 8.4–10.5)
CHLORIDE SERPL-SCNC: 106 MMOL/L — SIGNIFICANT CHANGE UP (ref 96–108)
CK SERPL-CCNC: 190 U/L — SIGNIFICANT CHANGE UP (ref 30–200)
CO2 SERPL-SCNC: 21 MMOL/L — LOW (ref 22–31)
CREAT SERPL-MCNC: 1.23 MG/DL — SIGNIFICANT CHANGE UP (ref 0.5–1.3)
CULTURE RESULTS: NO GROWTH — SIGNIFICANT CHANGE UP
EGFR: 72 ML/MIN/1.73M2 — SIGNIFICANT CHANGE UP
GLUCOSE SERPL-MCNC: 91 MG/DL — SIGNIFICANT CHANGE UP (ref 70–99)
HCT VFR BLD CALC: 42.5 % — SIGNIFICANT CHANGE UP (ref 39–50)
HGB BLD-MCNC: 13.1 G/DL — SIGNIFICANT CHANGE UP (ref 13–17)
MAGNESIUM SERPL-MCNC: 2.2 MG/DL — SIGNIFICANT CHANGE UP (ref 1.6–2.6)
MCHC RBC-ENTMCNC: 22.7 PG — LOW (ref 27–34)
MCHC RBC-ENTMCNC: 30.8 GM/DL — LOW (ref 32–36)
MCV RBC AUTO: 73.7 FL — LOW (ref 80–100)
NRBC # BLD: 0 /100 WBCS — SIGNIFICANT CHANGE UP (ref 0–0)
PHENYTOIN FREE SERPL-MCNC: 2.7 UG/ML — LOW (ref 10–20)
PHOSPHATE SERPL-MCNC: 3 MG/DL — SIGNIFICANT CHANGE UP (ref 2.5–4.5)
PLATELET # BLD AUTO: 270 K/UL — SIGNIFICANT CHANGE UP (ref 150–400)
POTASSIUM SERPL-MCNC: 4 MMOL/L — SIGNIFICANT CHANGE UP (ref 3.5–5.3)
POTASSIUM SERPL-SCNC: 4 MMOL/L — SIGNIFICANT CHANGE UP (ref 3.5–5.3)
RBC # BLD: 5.77 M/UL — SIGNIFICANT CHANGE UP (ref 4.2–5.8)
RBC # FLD: 14.5 % — SIGNIFICANT CHANGE UP (ref 10.3–14.5)
SODIUM SERPL-SCNC: 138 MMOL/L — SIGNIFICANT CHANGE UP (ref 135–145)
SPECIMEN SOURCE: SIGNIFICANT CHANGE UP
WBC # BLD: 6.89 K/UL — SIGNIFICANT CHANGE UP (ref 3.8–10.5)
WBC # FLD AUTO: 6.89 K/UL — SIGNIFICANT CHANGE UP (ref 3.8–10.5)

## 2022-05-11 PROCEDURE — 95720 EEG PHY/QHP EA INCR W/VEEG: CPT

## 2022-05-11 RX ORDER — PANTOPRAZOLE SODIUM 20 MG/1
40 TABLET, DELAYED RELEASE ORAL ONCE
Refills: 0 | Status: COMPLETED | OUTPATIENT
Start: 2022-05-11 | End: 2022-05-11

## 2022-05-11 RX ORDER — DIVALPROEX SODIUM 500 MG/1
750 TABLET, DELAYED RELEASE ORAL
Refills: 0 | Status: DISCONTINUED | OUTPATIENT
Start: 2022-05-12 | End: 2022-05-12

## 2022-05-11 RX ORDER — ONDANSETRON 8 MG/1
4 TABLET, FILM COATED ORAL EVERY 6 HOURS
Refills: 0 | Status: DISCONTINUED | OUTPATIENT
Start: 2022-05-11 | End: 2022-05-12

## 2022-05-11 RX ORDER — DIVALPROEX SODIUM 500 MG/1
1 TABLET, DELAYED RELEASE ORAL
Qty: 60 | Refills: 0
Start: 2022-05-11 | End: 2022-06-09

## 2022-05-11 RX ORDER — PANTOPRAZOLE SODIUM 20 MG/1
40 TABLET, DELAYED RELEASE ORAL
Refills: 0 | Status: DISCONTINUED | OUTPATIENT
Start: 2022-05-11 | End: 2022-05-12

## 2022-05-11 RX ORDER — ACETAMINOPHEN 500 MG
1000 TABLET ORAL ONCE
Refills: 0 | Status: COMPLETED | OUTPATIENT
Start: 2022-05-11 | End: 2022-05-11

## 2022-05-11 RX ORDER — DIVALPROEX SODIUM 500 MG/1
1.5 TABLET, DELAYED RELEASE ORAL
Qty: 90 | Refills: 0
Start: 2022-05-11 | End: 2022-06-09

## 2022-05-11 RX ORDER — VALPROIC ACID (AS SODIUM SALT) 250 MG/5ML
1000 SOLUTION, ORAL ORAL ONCE
Refills: 0 | Status: COMPLETED | OUTPATIENT
Start: 2022-05-11 | End: 2022-05-11

## 2022-05-11 RX ORDER — ONDANSETRON 8 MG/1
4 TABLET, FILM COATED ORAL ONCE
Refills: 0 | Status: COMPLETED | OUTPATIENT
Start: 2022-05-11 | End: 2022-05-11

## 2022-05-11 RX ORDER — VALPROIC ACID (AS SODIUM SALT) 250 MG/5ML
500 SOLUTION, ORAL ORAL THREE TIMES A DAY
Refills: 0 | Status: DISCONTINUED | OUTPATIENT
Start: 2022-05-11 | End: 2022-05-12

## 2022-05-11 RX ADMIN — CARVEDILOL PHOSPHATE 12.5 MILLIGRAM(S): 80 CAPSULE, EXTENDED RELEASE ORAL at 17:53

## 2022-05-11 RX ADMIN — CARVEDILOL PHOSPHATE 12.5 MILLIGRAM(S): 80 CAPSULE, EXTENDED RELEASE ORAL at 05:56

## 2022-05-11 RX ADMIN — ENOXAPARIN SODIUM 40 MILLIGRAM(S): 100 INJECTION SUBCUTANEOUS at 13:38

## 2022-05-11 RX ADMIN — Medication 1000 MILLIGRAM(S): at 09:40

## 2022-05-11 RX ADMIN — Medication 50 MILLIGRAM(S): at 09:48

## 2022-05-11 RX ADMIN — Medication 55 MILLIGRAM(S): at 13:17

## 2022-05-11 RX ADMIN — ONDANSETRON 4 MILLIGRAM(S): 8 TABLET, FILM COATED ORAL at 11:30

## 2022-05-11 RX ADMIN — LOSARTAN POTASSIUM 100 MILLIGRAM(S): 100 TABLET, FILM COATED ORAL at 05:54

## 2022-05-11 RX ADMIN — Medication 55 MILLIGRAM(S): at 22:32

## 2022-05-11 RX ADMIN — ONDANSETRON 4 MILLIGRAM(S): 8 TABLET, FILM COATED ORAL at 19:05

## 2022-05-11 RX ADMIN — PANTOPRAZOLE SODIUM 40 MILLIGRAM(S): 20 TABLET, DELAYED RELEASE ORAL at 11:40

## 2022-05-11 RX ADMIN — Medication 100 MILLIGRAM(S): at 05:54

## 2022-05-11 RX ADMIN — Medication 400 MILLIGRAM(S): at 09:05

## 2022-05-11 NOTE — EEG REPORT - NS EEG TEXT BOX
Start Date: 5/11/2022 – Day 1                                Start Time – 20:40       Duration – 10h 00m    PERTINENT MEDICATION:  valproate sodium IVPB 500 milliGRAM(s) IV Intermittent three times a day  _____________________________________________________________  STUDY INTERPRETATION    Findings: The background was continuous, spontaneously variable and reactive. During wakefulness, the posterior dominant rhythm consisted of symmetric, well-modulated 8 Hz activity, with amplitude to 30 uV, that attenuated to eye opening.  Low amplitude frontal beta was noted in wakefulness.    Background Slowing:  No generalized background slowing was present.    Focal Slowing:   None were present.    Sleep Background:  Drowsiness was characterized by fragmentation, attenuation, and slowing of the background activity.    Sleep was characterized by the presence of vertex waves, symmetric sleep spindles and K-complexes.    Other Non-Epileptiform Findings:  None were present.    Interictal Epileptiform Activity:   occasional generalized bifrontally predominant generalized spike waves in sleep    Events:  Clinical events: None recorded.  Seizures: None recorded.    Activation Procedures:   Hyperventilation was not performed.    Photic stimulation was not performed.     Artifacts:  Intermittent myogenic and movement artifacts were noted.    ECG:  The heart rate on single channel ECG was predominantly between 60-70 BPM.    _____________________________________________________________  EEG SUMMARY/CLASSIFICATION    Abnormal EEG in the awake, drowsy and asleep states.  - occasional generalized bifrontally predominant generalized spike waves in sleep  _____________________________________________________________  EEG IMPRESSION/CLINICAL CORRELATE    Abnormal EEG study.  EEG suggestive of increased risk for generalized seizure onset  No seizure seen.    Leo Weaver MD PGY-5  Epilepsy Fellow    This Preliminary report is based on fellow review. Final report pending attending review.    Reading Room: [phone]  On Call Service After Hours: [phone] Start Date: 5/11/2022 – Day 1                                Start Time – 20:40       Duration – 10h 00m    PERTINENT MEDICATION:  valproate sodium IVPB 500 milliGRAM(s) IV Intermittent three times a day  _____________________________________________________________  STUDY INTERPRETATION    Findings: The background was continuous, spontaneously variable and reactive. During wakefulness, the posterior dominant rhythm consisted of symmetric, well-modulated 8 Hz activity, with amplitude to 30 uV, that attenuated to eye opening.  Low amplitude frontal beta was noted in wakefulness.    Background Slowing:  No generalized background slowing was present.    Focal Slowing:   None were present.    Sleep Background:  Drowsiness was characterized by fragmentation, attenuation, and slowing of the background activity.    Sleep was characterized by the presence of vertex waves, symmetric sleep spindles and K-complexes.    Other Non-Epileptiform Findings:  None were present.    Interictal Epileptiform Activity:   occasional generalized bifrontally predominant generalized spike waves in sleep    Events:  Clinical events: None recorded.  Seizures: None recorded.    Activation Procedures:   Hyperventilation was not performed.    Photic stimulation was not performed.     Artifacts:  Intermittent myogenic and movement artifacts were noted.    ECG:  The heart rate on single channel ECG was predominantly between 60-70 BPM.    _____________________________________________________________  EEG SUMMARY/CLASSIFICATION    Abnormal EEG in the awake, drowsy and asleep states.  - occasional generalized bifrontally predominant generalized spike waves in sleep  _____________________________________________________________  EEG IMPRESSION/CLINICAL CORRELATE    Abnormal EEG study.  EEG suggestive of increased risk for generalized seizure onset  No seizure seen.    Leo Weaver MD PGY-5  Epilepsy Fellow    Pankaj Sutton MD PhD  Director, Epilepsy Division, [facility] EEG Reading Room Ph#: [phone]  Epilepsy Answering Service after 5PM and before 8:30AM: Ph#: [phone]

## 2022-05-11 NOTE — DISCHARGE NOTE PROVIDER - NSDCCPCAREPLAN_GEN_ALL_CORE_FT
PRINCIPAL DISCHARGE DIAGNOSIS  Diagnosis: Seizure  Assessment and Plan of Treatment: You had a breakthrough seizure and also more seizures recently. Please stop taking your home Dilantin anti-seizure medication. Please continue to take Depakote extended release 750mg ____. Please follow up closely with Dr. Randal Wade (seizure doctor) or your neurologist Dr. David.       PRINCIPAL DISCHARGE DIAGNOSIS  Diagnosis: Seizure  Assessment and Plan of Treatment: You had a breakthrough seizure and also more seizures recently. Please stop taking your home Dilantin anti-seizure medication. Please continue to take Depakote extended release 750mg twice daily Please follow up closely with Dr. Randal Wade (seizure doctor) or your neurologist Dr. David.  Seizure Safety Instructions  1. Avoid swimming, diving, taking a bath, cooking, use of motarized machineries.  2. Avoid climbing a ladder, trees or any height.  3. Use machines with safety switches.  4. Always be aware of your surroundings and make sure family and friends are aware of your seizures.  5. Use non-breakable dishes.  6. Consider wearing a medical bracelet to inform people you have epilepsy in case of an emergency.       PRINCIPAL DISCHARGE DIAGNOSIS  Diagnosis: Seizure  Assessment and Plan of Treatment: You had a breakthrough seizure and also more seizures recently. Please stop taking your home Dilantin anti-seizure medication. Please continue to take Depakote extended release 750mg twice daily Please follow up closely with Dr. Randal Wade (seizure doctor) or your neurologist Dr. David. CK was mildly elevated, which is an indication of muscle breakdown from a seizures. Please continue to stay hydrated at home.  Seizure Safety Instructions  1. [facility] law mandates you to self-report your seizure disorder to [facility], and be seizure-free for 1yr before you can drive.   2. Avoid swimming, diving, taking a bath, cooking, use of motarized machineries.  3. Avoid climbing a ladder, trees or any height.  4. Use machines with safety switches.  5. Always be aware of your surroundings and make sure family and friends are aware of your seizures.  6. Use non-breakable dishes.  7. Consider wearing a medical bracelet to inform people you have epilepsy in case of an emergency.

## 2022-05-11 NOTE — DISCHARGE NOTE PROVIDER - CARE PROVIDER_API CALL
ZITSER, GENE  Neurology  [address] [place] [address]  Phone: ()-  Fax: ()-  Established Patient  Follow Up Time: 2 weeks    Randal Wade)  Clinical Neurophysiology; Neurology  [address] [facility] [address]  [place] [address]  Phone: [phone]  Fax: [phone]  Follow Up Time: 2 weeks   ZITSER, GENE  Neurology  [address] [place] [address]  Phone: ()-  Fax: ()-  Established Patient  Follow Up Time: 2 weeks    Randal Wade)  Clinical Neurophysiology; Neurology  [address] [facility] [address]  [place] [address]  Phone: [phone]  Fax: [phone]  Follow Up Time: 2 weeks    Primary Care Provider,   Phone: (   )    -  Fax: (   )    -  Follow Up Time: 1 week

## 2022-05-11 NOTE — DISCHARGE NOTE PROVIDER - PROVIDER TOKENS
PROVIDER:[TOKEN:[[id number]],FOLLOWUP:[2 weeks],ESTABLISHEDPATIENT:[T]],PROVIDER:[TOKEN:[[id number]],FOLLOWUP:[2 weeks]] PROVIDER:[TOKEN:[[id number]],FOLLOWUP:[2 weeks],ESTABLISHEDPATIENT:[T]],PROVIDER:[TOKEN:[[id number]],FOLLOWUP:[2 weeks]],FREE:[LAST:[Primary Care Provider],PHONE:[(   )    -],FAX:[(   )    -],FOLLOWUP:[1 week]]

## 2022-05-11 NOTE — DISCHARGE NOTE PROVIDER - NSDCMRMEDTOKEN_GEN_ALL_CORE_FT
carvedilol 12.5 mg oral tablet: 1 tab(s) orally 2 times a day  Depakote  mg oral tablet, extended release: 1 tab(s) orally 2 times a day MDD:2 tablets  Depakote  mg oral tablet, extended release: 1 tab(s) orally 2 times a day MDD:max 2 tablets  Dilantin 100 mg oral capsule: 300 milligram(s) orally once a day, however takes 1 capsule in the morning and 2 capsules at night  olmesartan 40 mg oral tablet: 1 tab(s) orally once a day   carvedilol 12.5 mg oral tablet: 1 tab(s) orally 2 times a day  Depakote  mg oral tablet, extended release: 1 tab(s) orally 2 times a day MDD:2 tablets  Depakote  mg oral tablet, extended release: 1 tab(s) orally 2 times a day MDD:max 2 tablets  olmesartan 40 mg oral tablet: 1 tab(s) orally once a day

## 2022-05-11 NOTE — DISCHARGE NOTE PROVIDER - HOSPITAL COURSE
HPI:  47 y.o. RH M with PMH of epilepsy (since childhood) and HTN presented to [facility] ED on 5/10 due to seizure like activity. Per pt, had trouble sleeping so was up in bed ~04:43 hr on 5/10. Then he started to become rigid in all extremities and entire body shook for about 7 minutes. Pt was able to recollect what was going on at that time, and does not think he lost consciousness. Pt did not note any tongue biting or urinary incontinence. Similar episode happened in 4/2022 and was also able to remember what was going on. 3 years ago, had similar episode however reported loss of consciousness and tongue bite while at work. Has persistent hx of seizures since youth, starting 12 years af age. Usually had an aura where he had b/l feet warmth that climb up to the b/l knees then shaking started. Usually loses consciousness and often fell to the ground in the past.  Pt's outpt neurologist is Dr. David who had increased Dilantin dose from 200 mg BID to Dilantin 100 mg 1 capsule in the AM and 2 capsules at night time in 12/2021.     Semiology: Aura with sensation of warmth from b/l feet to the knees. Had b/l extremities shaking without urinary incontinence/tongue bite and without impaired awareness in 4-5/2022. In the past, there had been GTC with impaired awareness. Post-ictal word finding difficulty    Impression: breakthrough seizure and increased seizure frequency in pt with known epilepsy since childhood    Hospital Course:  Neuro exam non-focal. At [facility], labs significant for  and subtherapeutic dilantin level 5.3. No elevated leukocytosis or lactate. UA/Utox neg. RVP including COVID neg. Home Dilantin discontinued. Pt loaded with Depakote 1000mg and started on Depakote 500mg TID. Pt discharged on Depakote ER 750mg ___.     EEG 5/11/22:  Abnormal EEG in the awake, drowsy and asleep states.  - occasional generalized bifrontally predominant generalized spike waves in sleep  EEG suggestive of increased risk for generalized seizure onset  No seizure seen. HPI:  47 y.o. RH M with PMH of epilepsy (since childhood) and HTN presented to [facility] ED on 5/10 due to seizure like activity. Per pt, had trouble sleeping so was up in bed ~04:43 hr on 5/10. Then he started to become rigid in all extremities and entire body shook for about 7 minutes. Pt was able to recollect what was going on at that time, and does not think he lost consciousness. Pt did not note any tongue biting or urinary incontinence. Similar episode happened in 4/2022 and was also able to remember what was going on. 3 years ago, had similar episode however reported loss of consciousness and tongue bite while at work. Has persistent hx of seizures since youth, starting 12 years af age. Usually had an aura where he had b/l feet warmth that climb up to the b/l knees then shaking started. Usually loses consciousness and often fell to the ground in the past.  Pt's outpt neurologist is Dr. David who had increased Dilantin dose from 200 mg BID to Dilantin 100 mg 1 capsule in the AM and 2 capsules at night time in 12/2021.     Semiology: Aura with sensation of warmth from b/l feet to the knees. Had b/l extremities shaking without urinary incontinence/tongue bite and without impaired awareness in 4-5/2022. In the past, there had been GTC with impaired awareness. Post-ictal word finding difficulty    Impression: breakthrough seizure and increased seizure frequency in pt with known epilepsy since childhood    Hospital Course:  Neuro exam non-focal. At [facility], labs significant for  and subtherapeutic dilantin level 5.3. No elevated leukocytosis or lactate. UA/Utox neg. RVP including COVID neg. Home Dilantin discontinued. Pt loaded with Depakote 1000mg and started on Depakote 500mg TID. Pt discharged on Depakote ER 750mg twice daily. He can follow up with epileptologist Dr. Wade in the next 3-4 weeks or your neurologist.      EEG 5/11/22:  Abnormal EEG in the awake, drowsy and asleep states.  - occasional generalized bifrontally predominant generalized spike waves in sleep  EEG suggestive of increased risk for generalized seizure onset  No seizure seen.    Patient is neurologically stable for discharge. HPI:  47 y.o. RH M with PMH of epilepsy (since childhood) and HTN presented to [facility] ED on 5/10 due to seizure like activity. Per pt, had trouble sleeping so was up in bed ~04:43 hr on 5/10. Then he started to become rigid in all extremities and entire body shook for about 7 minutes. Pt was able to recollect what was going on at that time, and does not think he lost consciousness. Pt did not note any tongue biting or urinary incontinence. Similar episode happened in 4/2022 and was also able to remember what was going on. 3 years ago, had similar episode however reported loss of consciousness and tongue bite while at work. Has persistent hx of seizures since youth, starting 12 years af age. Usually had an aura where he had b/l feet warmth that climb up to the b/l knees then shaking started. Usually loses consciousness and often fell to the ground in the past.  Pt's outpt neurologist is Dr. David who had increased Dilantin dose from 200 mg BID to Dilantin 100 mg 1 capsule in the AM and 2 capsules at night time in 12/2021.     Semiology: Aura with sensation of warmth from b/l feet to the knees. Had b/l extremities shaking without urinary incontinence/tongue bite and without impaired awareness in 4-5/2022. In the past, there had been GTC with impaired awareness. Post-ictal word finding difficulty    Impression: breakthrough seizure and increased seizure frequency in pt with known epilepsy since childhood    Hospital Course:  Neuro exam non-focal. At [facility], labs significant for  and subtherapeutic dilantin level 5.3. No elevated leukocytosis or lactate. UA/Utox neg. RVP including COVID neg. Home Dilantin discontinued. Pt loaded with Depakote 1000mg and started on Depakote 500mg TID. Pt discharged on Depakote ER 750mg twice daily. He can follow up with epileptologist Dr. Wade in the next 3-4 weeks or your neurologist. Please remain hydrated at home. No driving as per [facility] law for 1 year. Blood pressure was elevated on admission. Continue taking your blood pressure medications, Coreg and Olmesartan at home Follow up with primary care provider for management of chronic medical conditions.     EEG 5/11/22:  Abnormal EEG in the awake, drowsy and asleep states.  - occasional generalized bifrontally predominant generalized spike waves in sleep  EEG suggestive of increased risk for generalized seizure onset  No seizure seen.    Patient is neurologically stable for discharge.

## 2022-05-11 NOTE — DISCHARGE NOTE PROVIDER - CARE PROVIDERS DIRECT ADDRESSES
,DirectAddress_Unknown,[email or web address] ,DirectAddress_Unknown,[email or web address],DirectAddress_Unknown

## 2022-05-12 ENCOUNTER — TRANSCRIPTION ENCOUNTER (OUTPATIENT)
Age: 47
End: 2022-05-12

## 2022-05-12 VITALS
DIASTOLIC BLOOD PRESSURE: 87 MMHG | SYSTOLIC BLOOD PRESSURE: 137 MMHG | OXYGEN SATURATION: 98 % | RESPIRATION RATE: 18 BRPM | HEART RATE: 64 BPM | TEMPERATURE: 98 F

## 2022-05-12 DIAGNOSIS — I10 ESSENTIAL (PRIMARY) HYPERTENSION: ICD-10-CM

## 2022-05-12 LAB
ALBUMIN SERPL ELPH-MCNC: 3.8 G/DL — SIGNIFICANT CHANGE UP (ref 3.3–5)
ALP SERPL-CCNC: 117 U/L — SIGNIFICANT CHANGE UP (ref 40–120)
ALT FLD-CCNC: 19 U/L — SIGNIFICANT CHANGE UP (ref 10–45)
ANION GAP SERPL CALC-SCNC: 11 MMOL/L — SIGNIFICANT CHANGE UP (ref 5–17)
AST SERPL-CCNC: 18 U/L — SIGNIFICANT CHANGE UP (ref 10–40)
BILIRUB SERPL-MCNC: 0.3 MG/DL — SIGNIFICANT CHANGE UP (ref 0.2–1.2)
BUN SERPL-MCNC: 12 MG/DL — SIGNIFICANT CHANGE UP (ref 7–23)
CALCIUM SERPL-MCNC: 9 MG/DL — SIGNIFICANT CHANGE UP (ref 8.4–10.5)
CHLORIDE SERPL-SCNC: 104 MMOL/L — SIGNIFICANT CHANGE UP (ref 96–108)
CO2 SERPL-SCNC: 25 MMOL/L — SIGNIFICANT CHANGE UP (ref 22–31)
CREAT SERPL-MCNC: 1.22 MG/DL — SIGNIFICANT CHANGE UP (ref 0.5–1.3)
EGFR: 73 ML/MIN/1.73M2 — SIGNIFICANT CHANGE UP
GLUCOSE SERPL-MCNC: 95 MG/DL — SIGNIFICANT CHANGE UP (ref 70–99)
HCT VFR BLD CALC: 41.5 % — SIGNIFICANT CHANGE UP (ref 39–50)
HGB BLD-MCNC: 12.9 G/DL — LOW (ref 13–17)
MAGNESIUM SERPL-MCNC: 2.2 MG/DL — SIGNIFICANT CHANGE UP (ref 1.6–2.6)
MCHC RBC-ENTMCNC: 22.9 PG — LOW (ref 27–34)
MCHC RBC-ENTMCNC: 31.1 GM/DL — LOW (ref 32–36)
MCV RBC AUTO: 73.6 FL — LOW (ref 80–100)
NRBC # BLD: 0 /100 WBCS — SIGNIFICANT CHANGE UP (ref 0–0)
PLATELET # BLD AUTO: 271 K/UL — SIGNIFICANT CHANGE UP (ref 150–400)
POTASSIUM SERPL-MCNC: 3.9 MMOL/L — SIGNIFICANT CHANGE UP (ref 3.5–5.3)
POTASSIUM SERPL-SCNC: 3.9 MMOL/L — SIGNIFICANT CHANGE UP (ref 3.5–5.3)
PROT SERPL-MCNC: 7.1 G/DL — SIGNIFICANT CHANGE UP (ref 6–8.3)
RBC # BLD: 5.64 M/UL — SIGNIFICANT CHANGE UP (ref 4.2–5.8)
RBC # FLD: 14.7 % — HIGH (ref 10.3–14.5)
SODIUM SERPL-SCNC: 140 MMOL/L — SIGNIFICANT CHANGE UP (ref 135–145)
WBC # BLD: 8.12 K/UL — SIGNIFICANT CHANGE UP (ref 3.8–10.5)
WBC # FLD AUTO: 8.12 K/UL — SIGNIFICANT CHANGE UP (ref 3.8–10.5)

## 2022-05-12 PROCEDURE — 95712 VEEG 2-12 HR INTMT MNTR: CPT

## 2022-05-12 PROCEDURE — 82550 ASSAY OF CK (CPK): CPT

## 2022-05-12 PROCEDURE — 80048 BASIC METABOLIC PNL TOTAL CA: CPT

## 2022-05-12 PROCEDURE — 83735 ASSAY OF MAGNESIUM: CPT

## 2022-05-12 PROCEDURE — 93005 ELECTROCARDIOGRAM TRACING: CPT

## 2022-05-12 PROCEDURE — 80053 COMPREHEN METABOLIC PANEL: CPT

## 2022-05-12 PROCEDURE — 95715 VEEG EA 12-26HR INTMT MNTR: CPT

## 2022-05-12 PROCEDURE — 95700 EEG CONT REC W/VID EEG TECH: CPT

## 2022-05-12 PROCEDURE — 85027 COMPLETE CBC AUTOMATED: CPT

## 2022-05-12 PROCEDURE — 99285 EMERGENCY DEPT VISIT HI MDM: CPT

## 2022-05-12 PROCEDURE — 96374 THER/PROPH/DIAG INJ IV PUSH: CPT

## 2022-05-12 PROCEDURE — 80185 ASSAY OF PHENYTOIN TOTAL: CPT

## 2022-05-12 PROCEDURE — 84100 ASSAY OF PHOSPHORUS: CPT

## 2022-05-12 RX ADMIN — LOSARTAN POTASSIUM 100 MILLIGRAM(S): 100 TABLET, FILM COATED ORAL at 06:24

## 2022-05-12 RX ADMIN — DIVALPROEX SODIUM 750 MILLIGRAM(S): 500 TABLET, DELAYED RELEASE ORAL at 06:24

## 2022-05-12 RX ADMIN — PANTOPRAZOLE SODIUM 40 MILLIGRAM(S): 20 TABLET, DELAYED RELEASE ORAL at 06:24

## 2022-05-12 RX ADMIN — CARVEDILOL PHOSPHATE 12.5 MILLIGRAM(S): 80 CAPSULE, EXTENDED RELEASE ORAL at 06:24

## 2022-05-12 NOTE — PROGRESS NOTE ADULT - ASSESSMENT
Assessment: 47 y.o. RH M with PMH of epilepsy (since childhood) and HTN presented to [facility] ED on 5/10 due to seizure like activity. Most recent episode happened ~04:43 hr on 5/10 involved generalized body shaking without impaired awareness. Reports seizure episodes date back to 12 years of age, involved GTC with impaired awareness and tongue biting. Recent dosage of Dilantin changed to 100 mg 1 capsule in AM and 2 capsules qhs per Dr David, outpt neurologist, in 12/2021. Neuro exam non-focal. At [facility], labs significant for  and subtherapeutic dilantin level 5.3. No elevated leukocytosis or lactate. UA/Utox neg. RVP including COVID neg.    Semiology: Aura with sensation of warmth from b/l feet to the knees. Had b/l extremities shaking without urinary incontinence/tongue bite and without impaired awareness in 4-5/2022. In the past, there had been GTC with impaired awareness. Post-ictal word finding difficulty    Impression: Breakthrough seizure of unclear etiology. Has hx of epilepsy since past    Plan:  - Off vEEG   - 5/11 D/C Phenytoin, will remain off  - Continue VPA  mg BID   - Neurocheck and vital q 4hrs  - DVT PPx with Lovenox 40mg subq daily  - Continue home BP medications for HTN  - Rescue: 1st line Ativan 1 mg for convulsion lasting >3 min                 2nd line VPA 1 g for seizure refractory to Ativan  - Diet: Regular  - DVT PPx: Lovenox 40 mg subq  - Dispo: D/C home today 5/12

## 2022-05-12 NOTE — EEG REPORT - NS EEG TEXT BOX
Start Date: 5/12/2022 – Day 2                                Start Time – 20:40       Duration – 10h 00m    PERTINENT MEDICATION:  valproate sodium IVPB 500 milliGRAM(s) IV Intermittent three times a day  _____________________________________________________________  STUDY INTERPRETATION    Findings: The background was continuous, spontaneously variable and reactive. During wakefulness, the posterior dominant rhythm consisted of symmetric, well-modulated 8 Hz activity, with amplitude to 30 uV, that attenuated to eye opening.  Low amplitude frontal beta was noted in wakefulness.    Background Slowing:  No generalized background slowing was present.    Focal Slowing:   None were present.    Sleep Background:  Drowsiness was characterized by fragmentation, attenuation, and slowing of the background activity.    Sleep was characterized by the presence of vertex waves, symmetric sleep spindles and K-complexes.    Other Non-Epileptiform Findings:  None were present.    Interictal Epileptiform Activity:   none    Events:  Clinical events: None recorded.  Seizures: None recorded.    Activation Procedures:   Hyperventilation was not performed.    Photic stimulation was not performed.     Artifacts:  Intermittent myogenic and movement artifacts were noted.    ECG:  The heart rate on single channel ECG was predominantly between 60-70 BPM.    _____________________________________________________________  EEG SUMMARY/CLASSIFICATION    normal EEG in the awake, drowsy and asleep states.  _____________________________________________________________  EEG IMPRESSION/CLINICAL CORRELATE    normal EEG study.  No seizure seen.    Leo Weaver MD PGY-5  Epilepsy Fellow    This Preliminary report is based on fellow review. Final report pending attending review.    Reading Room: [phone]  On Call Service After Hours: [phone] Start Date: 5/12/2022 – Day 2                                Start Time – 20:40       Duration – 10h 00m    PERTINENT MEDICATION:  valproate sodium IVPB 500 milliGRAM(s) IV Intermittent three times a day  _____________________________________________________________  STUDY INTERPRETATION    Findings: The background was continuous, spontaneously variable and reactive. During wakefulness, the posterior dominant rhythm consisted of symmetric, well-modulated 8 Hz activity, with amplitude to 30 uV, that attenuated to eye opening.  Low amplitude frontal beta was noted in wakefulness.    Background Slowing:  No generalized background slowing was present.    Focal Slowing:   None were present.    Sleep Background:  Drowsiness was characterized by fragmentation, attenuation, and slowing of the background activity.    Sleep was characterized by the presence of vertex waves, symmetric sleep spindles and K-complexes.    Other Non-Epileptiform Findings:  None were present.    Interictal Epileptiform Activity:   none    Events:  Clinical events: None recorded.  Seizures: None recorded.    Activation Procedures:   Hyperventilation was not performed.    Photic stimulation was not performed.     Artifacts:  Intermittent myogenic and movement artifacts were noted.    ECG:  The heart rate on single channel ECG was predominantly between 60-70 BPM.    _____________________________________________________________  EEG SUMMARY/CLASSIFICATION    normal EEG in the awake, drowsy and asleep states.  _____________________________________________________________  EEG IMPRESSION/CLINICAL CORRELATE    normal EEG study.  No seizure seen.    Leo Weaver MD PGY-5  Epilepsy Fellow    Pankaj Sutton MD PhD  Director, Epilepsy Division, [facility] EEG Reading Room Ph#: [phone]  Epilepsy Answering Service after 5PM and before 8:30AM: Ph#: [phone]

## 2022-05-12 NOTE — DISCHARGE NOTE NURSING/CASE MANAGEMENT/SOCIAL WORK - NSDCPEFALRISK_GEN_ALL_CORE
For information on Fall & Injury Prevention, visit: [email or web address]  [email or web address] OR  https://www.cdc.gov/steadi/patient.html

## 2022-05-12 NOTE — PROGRESS NOTE ADULT - SUBJECTIVE AND OBJECTIVE BOX
THE PATIENT WAS SEEN AND EXAMINED BY ME WITH THE HOUSESTAFF DURING MORNING ROUNDS.   HPI:  LOIDA MR [id number]    47 y.o. RH M with PMH of epilepsy (since childhood) and HTN presented to [facility] ED on 5/10 due to seizure like activity. Per pt, had trouble sleeping so was up in bed ~04:43 hr on 5/10. Then he started to become rigid in all extremities and entire body shook for about 7 minutes. Pt was able to recollect what was going on at that time, and does not think he lost consciousness. Pt did not note any tongue biting or urinary incontinence. Similar episode happened in 4/2022 and was also able to remember what was going on. 3 years ago, had similar episode however reported loss of consciousness and tongue bite while at work. Has persistent hx of seizures since youth, starting 12 years af age. Usually had an aura where he had b/l feet warmth that climb up to the b/l knees then shaking started. Usually loses consciousness and often fell to the ground in the past.  Pt's outpt neurologist is Dr. David who had increased Dilantin dose from 200 mg BID to Dilantin 100 mg 1 capsule in the AM and 2 capsules at night time in 12/2021. Currently denies any headache, double vision, blurry vision, n/v, pain/tingling/weakness in all extremities. Has muscle pain in chest, lower back, and up the spine intermittently.     Obtained medication reconciliation from [facility] at [address] [place] [address]  - Olmesartan 40 mg qd  - Carvedilol 12.5 mg bid  - Phenytoin 100 mg 1 capsule AM and 2 capsules AM  - Pt filled BP meds last in 3/2022 and dilantin 2/2022  (10 May 2022 16:15)      ROS: Otherwise negative.     SUBJECTIVE: No events overnight.  No new neurologic complaints.      carvedilol 12.5 milliGRAM(s) Oral every 12 hours  diVALproex  milliGRAM(s) Oral <User Schedule>  enoxaparin Injectable 40 milliGRAM(s) SubCutaneous every 24 hours  LORazepam   Injectable 1 milliGRAM(s) IV Push once PRN  losartan 100 milliGRAM(s) Oral daily  ondansetron    Tablet 4 milliGRAM(s) Oral every 6 hours PRN  pantoprazole    Tablet 40 milliGRAM(s) Oral before breakfast  valproate sodium IVPB 1000 milliGRAM(s) IV Intermittent Once PRN      Physical Exam: Neurological Exam:  	Mental Status: Orientated to self, date and place.  Attention intact.  No dysarthria, aphasia or neglect.  	Cranial Nerves: PERRL, EOMI, no nystagmus or diplopia. No facial asymmetry.  Hearing intact to finger rub bilaterally.  Tongue, uvula and palate midline.  Sternocleidomastoid and Trapezius intact bilaterally  	Motor: moving all 4 extremities equally w 5/5 strength  	Tone: normal.                  	Pronator drift: none                 	Dysmetria: None to finger-nose-finger  	No truncal ataxia.    	Tremor: No resting, postural or action tremor.  No myoclonus.  	Sensation: intact to light touch    LABS:                        12.9   8.12  )-----------( 271      ( 12 May 2022 06:08 )             41.5    05-12    140  |  104  |  12  ----------------------------<  95  3.9   |  25  |  1.22    Ca    9.0      12 May 2022 06:08  Phos  3.0     05-11  Mg     2.2     05-12    TPro  7.1  /  Alb  3.8  /  TBili  0.3  /  DBili  x   /  AST  18  /  ALT  19  /  AlkPhos  117  05-12           IMAGING:   EEG 5/12/22:  EEG SUMMARY/CLASSIFICATION    normal EEG in the awake, drowsy and asleep states.  _____________________________________________________________  EEG IMPRESSION/CLINICAL CORRELATE    normal EEG study.    EEG 5/11/22:  EEG SUMMARY/CLASSIFICATION    Abnormal EEG in the awake, drowsy and asleep states.  - occasional generalized bifrontally predominant generalized spike waves in sleep  _____________________________________________________________  EEG IMPRESSION/CLINICAL CORRELATE    Abnormal EEG study.  EEG suggestive of increased risk for generalized seizure onset  No seizure seen.  
TRANSFER

## 2022-05-12 NOTE — DISCHARGE NOTE NURSING/CASE MANAGEMENT/SOCIAL WORK - PATIENT PORTAL LINK FT
You can access the FollowMyHealth Patient Portal offered by [facility] by registering at the following website: [email or web address]. By joining [facility]’s FollowMyHealth portal, you will also be able to view your health information using other applications (apps) compatible with our system.

## 2022-06-07 ENCOUNTER — APPOINTMENT (OUTPATIENT)
Dept: NEUROLOGY | Facility: CLINIC | Age: 47
End: 2022-06-07

## 2022-06-07 ENCOUNTER — EMERGENCY (EMERGENCY)
Facility: HOSPITAL | Age: 47
LOS: 1 days | Discharge: HOME | End: 2022-06-07
Attending: EMERGENCY MEDICINE | Admitting: EMERGENCY MEDICINE
Payer: COMMERCIAL

## 2022-06-07 VITALS
RESPIRATION RATE: 16 BRPM | SYSTOLIC BLOOD PRESSURE: 141 MMHG | TEMPERATURE: 98 F | OXYGEN SATURATION: 100 % | HEART RATE: 60 BPM | DIASTOLIC BLOOD PRESSURE: 93 MMHG

## 2022-06-07 VITALS
HEART RATE: 63 BPM | SYSTOLIC BLOOD PRESSURE: 192 MMHG | DIASTOLIC BLOOD PRESSURE: 97 MMHG | OXYGEN SATURATION: 100 % | HEIGHT: 68 IN | RESPIRATION RATE: 17 BRPM | TEMPERATURE: 98 F

## 2022-06-07 LAB
ALBUMIN SERPL ELPH-MCNC: 3.6 G/DL — SIGNIFICANT CHANGE UP (ref 3.3–5)
ALP SERPL-CCNC: 87 U/L — SIGNIFICANT CHANGE UP (ref 40–120)
ALT FLD-CCNC: 17 U/L — SIGNIFICANT CHANGE UP (ref 4–41)
ANION GAP SERPL CALC-SCNC: 10 MMOL/L — SIGNIFICANT CHANGE UP (ref 7–14)
AST SERPL-CCNC: 23 U/L — SIGNIFICANT CHANGE UP (ref 4–40)
BASOPHILS # BLD AUTO: 0.02 K/UL — SIGNIFICANT CHANGE UP (ref 0–0.2)
BASOPHILS NFR BLD AUTO: 0.3 % — SIGNIFICANT CHANGE UP (ref 0–2)
BILIRUB SERPL-MCNC: <0.2 MG/DL — SIGNIFICANT CHANGE UP (ref 0.2–1.2)
BUN SERPL-MCNC: 11 MG/DL — SIGNIFICANT CHANGE UP (ref 7–23)
CALCIUM SERPL-MCNC: 8.3 MG/DL — LOW (ref 8.4–10.5)
CHLORIDE SERPL-SCNC: 105 MMOL/L — SIGNIFICANT CHANGE UP (ref 98–107)
CO2 SERPL-SCNC: 24 MMOL/L — SIGNIFICANT CHANGE UP (ref 22–31)
CREAT SERPL-MCNC: 1.12 MG/DL — SIGNIFICANT CHANGE UP (ref 0.5–1.3)
EGFR: 81 ML/MIN/1.73M2 — SIGNIFICANT CHANGE UP
EOSINOPHIL # BLD AUTO: 0.26 K/UL — SIGNIFICANT CHANGE UP (ref 0–0.5)
EOSINOPHIL NFR BLD AUTO: 4.3 % — SIGNIFICANT CHANGE UP (ref 0–6)
FLUAV AG NPH QL: SIGNIFICANT CHANGE UP
FLUBV AG NPH QL: SIGNIFICANT CHANGE UP
GLUCOSE SERPL-MCNC: 90 MG/DL — SIGNIFICANT CHANGE UP (ref 70–99)
HCT VFR BLD CALC: 39.5 % — SIGNIFICANT CHANGE UP (ref 39–50)
HGB BLD-MCNC: 12.1 G/DL — LOW (ref 13–17)
IANC: 3.03 K/UL — SIGNIFICANT CHANGE UP (ref 1.8–7.4)
IMM GRANULOCYTES NFR BLD AUTO: 0.3 % — SIGNIFICANT CHANGE UP (ref 0–1.5)
LYMPHOCYTES # BLD AUTO: 2.17 K/UL — SIGNIFICANT CHANGE UP (ref 1–3.3)
LYMPHOCYTES # BLD AUTO: 35.9 % — SIGNIFICANT CHANGE UP (ref 13–44)
MAGNESIUM SERPL-MCNC: 1.9 MG/DL — SIGNIFICANT CHANGE UP (ref 1.6–2.6)
MCHC RBC-ENTMCNC: 22.8 PG — LOW (ref 27–34)
MCHC RBC-ENTMCNC: 30.6 GM/DL — LOW (ref 32–36)
MCV RBC AUTO: 74.4 FL — LOW (ref 80–100)
MONOCYTES # BLD AUTO: 0.54 K/UL — SIGNIFICANT CHANGE UP (ref 0–0.9)
MONOCYTES NFR BLD AUTO: 8.9 % — SIGNIFICANT CHANGE UP (ref 2–14)
NEUTROPHILS # BLD AUTO: 3.03 K/UL — SIGNIFICANT CHANGE UP (ref 1.8–7.4)
NEUTROPHILS NFR BLD AUTO: 50.3 % — SIGNIFICANT CHANGE UP (ref 43–77)
NRBC # BLD: 0 /100 WBCS — SIGNIFICANT CHANGE UP
NRBC # FLD: 0 K/UL — SIGNIFICANT CHANGE UP
PLATELET # BLD AUTO: 262 K/UL — SIGNIFICANT CHANGE UP (ref 150–400)
POTASSIUM SERPL-MCNC: 4 MMOL/L — SIGNIFICANT CHANGE UP (ref 3.5–5.3)
POTASSIUM SERPL-SCNC: 4 MMOL/L — SIGNIFICANT CHANGE UP (ref 3.5–5.3)
PROT SERPL-MCNC: 7.3 G/DL — SIGNIFICANT CHANGE UP (ref 6–8.3)
RBC # BLD: 5.31 M/UL — SIGNIFICANT CHANGE UP (ref 4.2–5.8)
RBC # FLD: 14.6 % — HIGH (ref 10.3–14.5)
RSV RNA NPH QL NAA+NON-PROBE: SIGNIFICANT CHANGE UP
SARS-COV-2 RNA SPEC QL NAA+PROBE: DETECTED
SODIUM SERPL-SCNC: 139 MMOL/L — SIGNIFICANT CHANGE UP (ref 135–145)
TROPONIN T, HIGH SENSITIVITY RESULT: 7 NG/L — SIGNIFICANT CHANGE UP
TROPONIN T, HIGH SENSITIVITY RESULT: 8 NG/L — SIGNIFICANT CHANGE UP
WBC # BLD: 6.04 K/UL — SIGNIFICANT CHANGE UP (ref 3.8–10.5)
WBC # FLD AUTO: 6.04 K/UL — SIGNIFICANT CHANGE UP (ref 3.8–10.5)

## 2022-06-07 PROCEDURE — 99285 EMERGENCY DEPT VISIT HI MDM: CPT | Mod: 25

## 2022-06-07 PROCEDURE — 71045 X-RAY EXAM CHEST 1 VIEW: CPT | Mod: 26

## 2022-06-07 PROCEDURE — 93010 ELECTROCARDIOGRAM REPORT: CPT

## 2022-06-07 RX ORDER — ACETAMINOPHEN 500 MG
650 TABLET ORAL ONCE
Refills: 0 | Status: COMPLETED | OUTPATIENT
Start: 2022-06-07 | End: 2022-06-07

## 2022-06-07 RX ORDER — IBUPROFEN 200 MG
400 TABLET ORAL ONCE
Refills: 0 | Status: COMPLETED | OUTPATIENT
Start: 2022-06-07 | End: 2022-06-07

## 2022-06-07 RX ORDER — SODIUM CHLORIDE 9 MG/ML
1000 INJECTION INTRAMUSCULAR; INTRAVENOUS; SUBCUTANEOUS ONCE
Refills: 0 | Status: COMPLETED | OUTPATIENT
Start: 2022-06-07 | End: 2022-06-07

## 2022-06-07 RX ORDER — AMLODIPINE BESYLATE 2.5 MG/1
5 TABLET ORAL ONCE
Refills: 0 | Status: COMPLETED | OUTPATIENT
Start: 2022-06-07 | End: 2022-06-07

## 2022-06-07 RX ADMIN — SODIUM CHLORIDE 1000 MILLILITER(S): 9 INJECTION INTRAMUSCULAR; INTRAVENOUS; SUBCUTANEOUS at 10:54

## 2022-06-07 RX ADMIN — Medication 400 MILLIGRAM(S): at 10:53

## 2022-06-07 RX ADMIN — AMLODIPINE BESYLATE 5 MILLIGRAM(S): 2.5 TABLET ORAL at 10:54

## 2022-06-07 RX ADMIN — Medication 650 MILLIGRAM(S): at 10:53

## 2022-06-07 NOTE — ED PROVIDER NOTE - NSFOLLOWUPINSTRUCTIONS_ED_ALL_ED_FT
COVID-19 (Coronavirus Disease 2019)    WHAT YOU NEED TO KNOW:    What do I need to know about coronavirus disease 2019 (COVID-19)? COVID-19 is the disease caused by the novel (new) coronavirus first discovered in December 2019. Coronaviruses generally cause upper respiratory (nose, throat, and lung) infections, such as a cold. The new virus can also cause serious lower respiratory conditions, such as pneumonia or acute respiratory distress syndrome (ARDS). Anyone can develop serious problems from the new virus, but your risk is higher if you are 65 or older. A weak immune system, diabetes, or a heart or lung condition can also increase your risk.    What are the signs and symptoms of COVID-19? You may not develop any signs or symptoms. Signs and symptoms that do develop usually start about 5 days after infection but can take 2 to 14 days. Signs and symptoms range from mild to severe. You may feel like you have the flu or a bad cold. Information on COVID-19 is still being learned. Tell your healthcare provider if you think you were infected but develop signs or symptoms not listed below:  •A cough  •Shortness of breath or trouble breathing that may become severe  •A fever of at least 100.4°F, or 38°C (may be lower in adults 65 or older)  •Chills that might include shaking  •Muscle pain, body aches, or a headache  •A sore throat  •Suddenly not being able to taste or smell anything  •Feeling mentally and physically tired (fatigue)  •Congestion (stuffy head and nose), or a runny nose.  •Diarrhea, nausea, or vomiting    How is COVID-19 diagnosed? If you think you have COVID-19, call your healthcare provider. In some areas, testing is only done if a person has severe symptoms or is hospitalized. Testing is done more widely in other places. Your provider will tell you what to do based on your symptoms and the rules in your area. In general, the following may be used:   •A viral test shows if you have a current infection. Samples are taken from your nose and throat, usually with swabs. You may need to wait several days to get the test results. Your healthcare provider will tell you how to get your results. You will need to quarantine (stay physically away from others) until you get your results. If results show you have COVID-19, you will need to quarantine until you are well. Your provider or other health official may give you more directions. You will also need to prevent another infection until it is known if you can get COVID-19 again.  •An antibody test shows if you had a past infection. Blood samples are used for this test. Antibodies are made by your immune system to attack the virus that causes COVID-19. Antibodies will form 1 to 3 weeks after you are infected. It is not known if antibodies prevent a second infection, or for how long a person might be protected. If you have antibodies, you will still need to be careful around others until more is known.  •CT scans or x-rays may be used to check for signs of pneumonia. The 2019 coronavirus causes a specific kind of pneumonia, usually in both lungs.      How is COVID-19 treated? No medicine or specific treatment is currently approved for COVID-19. The following may be used to manage your symptoms or treat the effects of COVID-19:   •Mild symptoms may get better on their own. If you do not need to be treated in a hospital, you will be given instructions to use at home. Your condition will be closely monitored. You will need to watch for worsening symptoms and seek immediate care if needed. Talk to your healthcare provider about the following:?Relieve your symptoms. To soothe a sore throat, gargle with warm salt water, or use throat lozenges or a throat spray. Your healthcare provider may recommend a cough medicine. Drink more liquids to thin and loosen mucus and to prevent dehydration. Use decongestants or saline drops as directed for nasal congestion.  ?NSAIDs or acetaminophen can help lower a fever and relieve body aches or a headache. Follow directions. If not taken correctly, NSAIDs can cause kidney damage and acetaminophen can cause liver damage.    •Severe or life-threatening symptoms are treated in the hospital. You may need a combination of the following:?Medicines may be given to reduce inflammation or to fight the virus. You may also need blood thinners to prevent or treat blood clots. If you have a deep vein thrombosis (DVT) or pulmonary embolism (PE), you may need to keep using blood thinners for 3 months.  ?Extra oxygen may be given if you have respiratory failure. This means your lungs cannot get enough oxygen into your blood and out to your organs. Extra oxygen can help prevent organ failure.  ?A ventilator may be used to help you breathe.  ?Convalescent plasma (part of blood) from a patient who has recovered from COVID-19 may be used. The plasma contains antibodies that can help your body fight the infection. Convalescent plasma is only given to patients who have severe signs and symptoms.        How does the 2019 coronavirus spread? The virus spreads quickly and easily. You can become infected if you are in contact with a large amount of the virus, even for a short time. You can also become infected by being around a small amount of virus for a long time. The following are ways the virus is thought to spread, but more information may be coming:   •Droplets are the most common way all coronaviruses spread. The virus can travel in droplets that form when a person talks, coughs, or sneezes. Anyone who breathes in the droplets or gets them in his or her eyes can become infected with the virus. Close personal contact with an infected person is thought to be the main way the virus spreads. Close personal contact means you are within 6 feet (2 meters) of the person.  •Person-to-person contact can spread the virus. For example, a person with the virus on his or her hands can spread it by shaking hands with someone. At this time, it does not appear that the virus can be passed to a baby during pregnancy or delivery. The baby can be infected after he or she is born through person-to-person contact. The virus also does not appear to spread in breast milk. If you are pregnant or breastfeeding, talk to your healthcare provider or obstetrician about any concerns you have.  •The virus can stay on objects and surfaces. A person can get the virus on his or her hands by touching the object or surface. Infection happens if the person then touches his or her eyes or mouth with unwashed hands. It is not yet known how long the virus can stay on an object or surface. That is why it is important to clean all surfaces that are used regularly.  •An infected animal may be able to infect a person who touches it. This may happen at live markets or on a farm.      How can everyone lower the risk for COVID-19? The best way to prevent infection is to avoid anyone who is infected, but this can be hard to do. An infected person can spread the virus before signs or symptoms begin, or even if signs or symptoms never develop. The following can help lower the risk for infection:   Limit the Spread of Infectious Disease    •Wash your hands often throughout the day. Use soap and water. Rub your soapy hands together, lacing your fingers. Wash the front and back of each hand, and in between your fingers. Use the fingers of one hand to scrub under the fingernails of the other hand. Wash for at least 20 seconds. Rinse with warm, running water for several seconds. Then dry your hands with a clean towel or paper towel. Use hand  that contains alcohol if soap and water are not available. Do not touch your eyes, nose, or mouth without washing your hands first. Teach children how to wash their hands and use hand .  Handwashing  •Cover a sneeze or cough. This prevents droplets from traveling from you to others. Turn your face away and cover your mouth and nose with a tissue. Throw the tissue away. Use the bend of your arm if a tissue is not available. Then wash your hands well with soap and water or use hand . Turn and cover your face if you are around someone who is sneezing or coughing. Teach children how to cover a cough or sneeze.  •Follow worldwide, national, and local social distancing guidelines. Social distancing means people avoid close physical contact so the virus cannot spread from one person to another. Keep at least 6 feet (2 meters) between you and others. Also keep this distance from anyone who comes to your home, such as someone making a delivery.  •Make a habit of not touching your face. It is not known how long the virus can stay on objects and surfaces. If you get the virus on your hands, you can transfer it to your eyes, nose, or mouth and become infected. You can also transfer it to objects, surfaces, or people. Be aware of what you touch when you go out. Examples include handrails and elevator buttons. Try not to touch anything with bare hands unless it is necessary. Wash your hands before you leave your home and when you return.  •Clean and disinfect high-touch surfaces and objects often. Use a disinfecting solution or wipes. You can make a solution by diluting 4 teaspoons of bleach in 1 quart (4 cups) of water. Clean and disinfect even if you think no one living in or coming to your home is infected with the virus. You can wipe items with a disinfecting cloth before you bring them into your home. Wash your hands after you handle anything you bring into your home.  •Make your immune system as healthy as possible. A weakened immune system makes you more vulnerable to the new coronavirus. No COVID-19 vaccine is available yet. Vaccines such as the flu and pneumonia vaccines can help your immune system. Your healthcare provider can tell you which vaccines to get, and when to get them. Keep your immune system as strong as possible. Do not smoke. Eat healthy foods, exercise regularly, and try to manage stress. Go to bed and wake up at the same times each day.        How do I follow social distancing guidelines to help lower the risk for COVID-19? National and local social distancing rules vary. Rules may change over time as restrictions are lifted. Restrictions may return if an outbreak happens where you live. It is important to know and follow all current social distancing rules in your area. The following are general guidelines:  •Limit trips out of your home. You may be able to have food, medicines, and other supplies delivered. If possible, have delivered items left at your door or other area. Try not to have someone hand you an item. You will be so close to the person that the virus can spread between you.  •Do not have close physical contact with anyone who does not live in your home. Do not shake hands with, hug, or kiss a person as a greeting. Stand or walk as far from others as possible. If you must use public transportation (such as a bus or subway), try to sit or stand away from others. You can stay safely connected with others through phone calls, e-mail messages, social media websites, and video chats. Check in on anyone who may be having a hard time socially distancing, or who lives alone. Ask administrators at nursing homes or long-term care facilities how you can safely communicate with someone living there.  •Wear a cloth face covering around others who do not live in your home. Face coverings help prevent the virus from spreading to others in droplets. You can use a clear face covering if someone needs to read your lips. This is a cloth covering that has plastic over the mouth area so your lips can be seen. Do not use coverings that have breathing valves or vents. The virus can travel out of the valve or vent and be spread to others. Do not take your covering off to talk, cough, or sneeze. Do not use coverings on children younger than 2 years or on anyone who has breathing problems or cannot remove it.  •Only allow medical or other necessary professionals into your home. Wear your face covering, and remind professionals to wear a face covering. Remind them to wash their hands when they arrive and before they leave. Do not let anyone who does not live in your home in, even if the person is not sick. A person can pass the virus to others before symptoms of COVID-19 begin. Some people never even develop symptoms. Children commonly have mild symptoms or no symptoms. It may be hard to tell a child not to hug or kiss you. Explain that this is how he or she can help you stay healthy.  •Do not go to someone else's home unless it is necessary. Do not go over to visit, even if the person is lonely. Only go if you need to help him or her. Make sure you both wear face coverings while you are there.  •Avoid large gatherings and crowds. Gatherings or crowds of 10 or more individuals can cause the virus to spread. Examples of gatherings include parties, sporting events, Church services, and conferences. Crowds may form at beaches, [place], and tourist attractions. Protect yourself by staying away from large gatherings and crowds.  •Ask your healthcare provider for other ways to have appointments. You may be able to have appointments without having to go into the provider's office. Some providers offer phone, video, or other types of appointments. You may also be able to get prescriptions for a few months of your medicines at a time.  •Stay safe if you must go out to work. You may have a job that can only be done outside your home. Keep physical distance between you and other workers as much as possible. Follow your employer's rules so everyone stays safe.      What should I do if I have COVID-19 and am recovering at home? Healthcare providers will give you specific instructions to follow. The following are general guidelines to remind you how to keep others safe until you are well:   •Wash your hands often. Use soap and water as much as possible. You can use hand  that contains alcohol if soap and water are not available. Do not share towels with anyone. If you use paper towels, throw them away in a lined trash can kept in your room or area. Use a covered trash can, if possible.  •Do not go out of your home unless it is necessary. You may have to go to your healthcare provider's office for check-ups or to get prescription refills. Do not arrive at the provider's office without an appointment. Providers have to make their offices safe for staff and other patients.  •Do not have close physical contact with anyone unless it is necessary. Only have close physical contact with a person giving direct care, or a baby or child you must care for. Family members and friends should not visit you. If possible, stay in a separate area or room of your home if you live with others. No one should go into the area or room except to give you care. You can visit with others by phone, video chat, e-mail, or similar systems. It is important to stay connected with others in your life while you recover.  •Wear a face covering while others are near you. This can help prevent droplets from spreading the virus when you talk, sneeze, or cough. Put the covering on before anyone comes into your room or area. Remind the person to cover his or her nose and mouth before going in to provide care for you.  •Do not share items. Do not share dishes, towels, or other items with anyone. Items need to be washed after you use them.  •Protect your baby. Wash your hands with soap and water often throughout the day. Wear a clean face covering while you breastfeed, or while you express or pump breast milk. If possible, ask someone who is well to care for your baby. You can put breast milk in bottles for the person to use, if needed. Talk to your healthcare provider if you have any questions or concerns about caring for or bonding with your baby. He or she will tell you when to bring your baby in for check-ups and vaccines. He or she will also tell you what to do if you think your baby was infected with the new virus.  •Do not handle live animals. Until more is known, it is best not to touch, play with, or handle live animals. Some animals, including pets, have been infected with the new coronavirus. Do not handle or care for animals until you are well. Care includes feeding, petting, and cuddling your pet. Do not let your pet lick you or share your food. Ask someone who is not infected to take care of your pet, if possible. If you must care for a pet, wear a face covering. Wash your hands before and after you give care.  •Follow directions from your healthcare provider for being around others after you recover. You will need to wait at least 10 days after symptoms first appeared. Then you will need to have no fever for 24 hours without fever medicine, and no other symptoms. A loss of taste or smell may continue for several months. It is considered okay to be around others if this is your only symptom. It is not known for sure if or for how long a recovered person can pass the virus to others. Your provider may give you instructions, such as continuing social distancing or wearing a face covering around others.  How should I take care of someone who has COVID-19? If the person lives in another home, arrange for a time to give care. Remember to bring a few pairs of disposable gloves and a cloth face covering. The following are general guidelines to help you safely care for anyone who has COVID-19:  •Wash your hands often. Wash before and after you go into the person's home, area, or room. Throw paper towels away in a lined trash can that has a lid, if possible.  •Do not allow others to go near the person. No one should come into the person's home unless it is necessary. If possible, the person should be in a separate area or room if he or she lives with others. Keep the room's door shut unless you need to go in or out. Have others call, video chat, or e-mail the person if he or she is feeling well enough. The person may feel lonely if he or she is kept separate for a long period of time. Safe communication can help him or her stay connected to family and friends.  •Make sure the person's room has good air flow. You may be able to open the window if the weather allows. An air conditioner can also be turned on to help air move.  •Contact the person before you go in to give care. Make sure the person is wearing a face covering. Remind him or her to wash his or her hands with soap and water. He or she can use hand  that contains alcohol if soap and water are not available. Put on a face covering before you go in to give care.  •Wear gloves while you give care and clean. Clean items the person uses often. Clean countertops, cooking surfaces, and the fronts and insides of the microwave and refrigerator. Clean the shower, toilet, the area around the toilet, the sink, the area around the sink, and faucets. Gather used laundry or bedding. Wash and dry items on the warmest settings the fabric allows. Wash dishes and silverware in hot, soapy water or in a .  •Anything you throw away needs to go into a lined trash can. When you need to empty the trash, close the open end of the lining and tie it closed. This helps prevent items the virus is on from spilling out of the trash. Remove your gloves and throw them away. Wash your hands.      Where can I find more information?   •Centers for Disease Control and Prevention  [address] [place] [address]  Phone: 1-952.692.3367  Web Address: http://www.cdc.gov    What should I do if I think I or someone I know may be infected? Do the following to protect others:   •If emergency care is needed, tell the  about the possible infection, or call ahead and tell the emergency department.  •Call a healthcare provider for instructions if symptoms are mild. Anyone who may be infected should not arrive without calling first. The provider will need to protect staff members and other patients.  •The person who may be infected needs to wear a face covering while getting medical care. This will help lower the risk of infecting others. Coverings are not used for anyone who is younger than 2 years, has breathing problems, or cannot remove it. The provider can give you instructions for anyone who cannot wear a covering.      Call your local emergency number (911 in the ) or an emergency department if:   •You have trouble breathing or shortness of breath at rest.  •You have chest pain or pressure that lasts longer than 5 minutes.  •You become confused or hard to wake.  •Your lips or face are blue.  •You have a fever of 104°F (40°C) or higher.  When should I call my doctor?   •You do not have symptoms of COVID-19 but had close physical contact within 14 days with someone who tested positive.  •You have questions or concerns about your condition or care.      CARE AGREEMENT:  You have the right to help plan your care. Learn about your health condition and how it may be treated. Discuss treatment options with your healthcare providers to decide what care you want to receive. You always have the right to refuse treatment.

## 2022-06-07 NOTE — ED PROVIDER NOTE - NS ED ROS FT
REVIEW OF SYSTEMS:   CONSTITUTIONAL: + fever, no weight loss, or fatigue  EYES: No eye pain, visual disturbances, or discharge  ENMT:  + nasal congestion, No difficulty hearing, tinnitus, vertigo; No sinus or throat pain  NECK: No pain or stiffness  RESPIRATORY: No cough, wheezing, chills or hemoptysis; No shortness of breath  CARDIOVASCULAR: + chest pain, no palpitations, dizziness, or leg swelling  GASTROINTESTINAL: No abdominal or epigastric pain. No nausea, vomiting, or hematemesis; No diarrhea or constipation. No melena or hematochezia.  GENITOURINARY: No dysuria, frequency, hematuria, or incontinence  NEUROLOGICAL: + headaches, no memory loss, loss of strength, numbness, or tremors  SKIN: No itching, burning, rashes, or lesions

## 2022-06-07 NOTE — ED PROVIDER NOTE - OBJECTIVE STATEMENT
48 yo M with HTN (On coreg 12.5mg BID and Amlodipine 5mg daily), and epilepsy (divalproex 500mg ER) a/w chest pain and fevers, and total body pains since 6/2.  Pt reports having intermittent fevers, chills, since 6/2, noted to have rapid test yesterday with 2 lines.  Reports having chest pressure non-exertional.  Reports stuffy nose, with nasal congestion and dull headache.  No photophobia/eye pain/changes in vision, No ear pain/sore throat/dysphagia, no SOB/cough/wheeze/stridor, No abd pain, No N/V/D, no dysuria/frequency/discharge, No neck/back pain, no rash, no changes in neurological status/function.

## 2022-06-07 NOTE — ED PROVIDER NOTE - PATIENT PORTAL LINK FT
You can access the FollowMyHealth Patient Portal offered by [facility] by registering at the following website: [email or web address]. By joining [facility]’s FollowMyHealth portal, you will also be able to view your health information using other applications (apps) compatible with our system.

## 2022-06-07 NOTE — ED PROVIDER NOTE - PHYSICAL EXAMINATION
GEN - NAD; well appearing; A+O x3; non-toxic appearing  HEAD: NCAT; EYES/NOSE: PERRLA, EOMI, no discharge; THROAT: Oral cavity and pharynx normal. No inflammation, swelling, exudate, or lesions.   CARD -s1s2, RRR, no M,G,R;   PULM - CTA b/l, symmetric breath sounds;   ABD -  +BS, ND, NT, soft, no guarding, no rebound, no masses;   BACK - no CVA tenderness, Normal  spine;   EXT - symmetric pulses, 2+ dp, capillary refill < 2 seconds, no cyanosis, no edema;   NEURO: alert, CN 2-12 intact, reflexes nl, sensation nl, coordination nl,  romberg negative, motor 5/5 RUE/LUE/RLE/LLE/EHL/Plantar flexion, no  gait nl

## 2022-06-07 NOTE — ED ADULT TRIAGE NOTE - CHIEF COMPLAINT QUOTE
pt c/o chest pressure x 5 days, with sinus congestion, phlegm production and "body fever" states " my niece tested me for covid and there was an extra line"

## 2022-06-07 NOTE — ED PROVIDER NOTE - CLINICAL SUMMARY MEDICAL DECISION MAKING FREE TEXT BOX
46 yo M with HTN (On coreg 12.5mg BID and Amlodipine 5mg daily), and epilepsy (divalproex 500mg ER) a/w chest pain and fevers, and total body pains since 6/2. Noted to be COVID + 2 days ago at home, here for worsening chest pain with no shortness of breath, no dizziness, no headache at this time.  Exam with no acute finding.  Check cbc, cmp, troponin, ekg non-ischemic.  WIll give additional amlodipine 5mg given hypertension.  Re-check covid at patient request.

## 2022-06-07 NOTE — ED ADULT NURSE REASSESSMENT NOTE - NS ED NURSE REASSESS COMMENT FT1
Pt awake and alert PMH HTN presenting to ED for + rapid home Covid test. Pt c/o cough, congestion, and fevers since Thursday. Pt denies trouble breathing. 20g IV placed in L hand, labs and Covid sent

## 2022-07-01 ENCOUNTER — APPOINTMENT (OUTPATIENT)
Dept: NEUROLOGY | Facility: CLINIC | Age: 47
End: 2022-07-01

## 2022-07-01 VITALS
BODY MASS INDEX: 27.28 KG/M2 | SYSTOLIC BLOOD PRESSURE: 134 MMHG | HEIGHT: 68 IN | WEIGHT: 180 LBS | DIASTOLIC BLOOD PRESSURE: 78 MMHG | HEART RATE: 58 BPM

## 2022-07-01 DIAGNOSIS — M54.2 CERVICALGIA: ICD-10-CM

## 2022-07-01 DIAGNOSIS — M47.812 SPONDYLOSIS W/OUT MYELOPATHY OR RADICULOPATHY, CERVICAL REGION: ICD-10-CM

## 2022-07-01 DIAGNOSIS — G89.29 CERVICALGIA: ICD-10-CM

## 2022-07-01 DIAGNOSIS — R56.9 UNSPECIFIED CONVULSIONS: ICD-10-CM

## 2022-07-01 PROCEDURE — 99214 OFFICE O/P EST MOD 30 MIN: CPT

## 2022-07-05 NOTE — ASSESSMENT
[FreeTextEntry1] : 46 yo RH man with long-standing history of epilepsy.  Generalized spikes seen on interictal EEG (primary generalized epilepsy vs focal with secondary bilateral synchrony), stable on Depakote ER monotherapy.\par \par Chronic neck pain, likely due to cervical spondylosis.\par \par Plan:\par 1. Continue Depakote ER 500mg PO BID\par 2. He will notify me of any side effects or suspected breakthrough seizures\par 3. Seizure precautions, including no driving or operating heavy equipment, no unsupervised swimming, using a shower instead of a bathtub, no climbing ladders or working at elevations, no use of sharp dangerous tools or devices.\par 4. Neck pain: PT\par 5. Patient agrees with plan.\par 6. Follow up in 6 months.\par \par Randal Wade MD\par [facility]\par \par Greater than 50% of the encounter was spent on counseling and coordination of care discussing differential diagnosis, diagnostic testing, and treatment options. We have talked about appropriate follow up, and I have spent 45 minutes of face to face time with the patient.\par

## 2022-07-05 NOTE — PHYSICAL EXAM
[FreeTextEntry1] : Awake, alert, oriented x 3.  Language fluent.  Comprehension intact.  Naming intact.  Repetition intact.  Affect normal.  Cranial nerves grossly intact.  Motor exam: normal bulk, normal tone, 5/5 in all four extremities.  No tremors or fasciculations.  Sensory exam: intact to LT/PP/LUIS/vibration.  DTRs: 2+ throughout, flexor plantar response bilaterally, no clonus.  Coordination: no dysmetria.  Gait: normal toe/heel/tandem gait.  Romberg - negative\par

## 2022-07-05 NOTE — HISTORY OF PRESENT ILLNESS
[FreeTextEntry1] : 48 yo RH man with a history of epilepsy since age 12, recently hospitalized for a breakthrough seizure, and Dilantin was discontinued, and he was started on Depakote ER 750mg BID.  However, this caused side effects (fatigue), so he lowered the dose to 500mg BID, and has been doing well on this dose, no seizures or side effects.  \par \par Seizure descripton: he gets a warning of rising sensation from his feet and legs up into his head, then his body becomes rigid.  He may maintain awareness during this, but can lose consciousness also.  Duration is several seconds.   He has had GTC seizures, last one was 3 years ago. \par \par EEG showed generalized spikes, no seizures recorded.\par Head CT - normal\par \par He also reports chronic neck pain radiating up in to the right side of his head, and right arm/leg.\par \par Current AEDs:\par Depakote ER 500mg BID\par \par Previous AEDs:\par PHT\par

## 2022-07-19 NOTE — ED ADULT NURSE NOTE - HOW OFTEN DO YOU HAVE A DRINK CONTAINING ALCOHOL?
Date of Office Visit: 22  Encounter Provider: Gigi Dailey MD  Place of Service: [facility] CARDIOLOGY  Patient Name: Jesse Espinal  :1955  [id number]    Chief Complaint   Patient presents with   • Coronary Artery Disease   :     HPI: Jesse Espinal is a 67 y.o. female  she has a history of coronary artery disease in  she had 4 stents put in the [facility] and in  she had 2 stents put in it had her on chronic Brilinta since.  She said that she was on clopidogrel and that for some reason they said it did not work and so she has been on chronic Brilinta since I do not have those records.  She has not had any chest pain shortness of breath PND orthopnea edema syncope palpitations her A1c is in the sixes.  She had a stress test last year that was normal she has a lot of nuisance bruising    Past Medical History:   Diagnosis Date   • Acid reflux disease    • Angina pectoris (HCC)    • Anxiety    • CAD (coronary artery disease)    • Depression    • Diabetes mellitus, type II (HCC)    • Dysuria    • Fatigue    • Foot pain    • GERD (gastroesophageal reflux disease)    • Hematuria    • Hypercholesterolemia    • Hypertension    • MORRIS (obstructive sleep apnea)        Past Surgical History:   Procedure Laterality Date   • APPENDECTOMY     • CARDIAC CATHETERIZATION  2013    SEVERE 3 VESSEL CAD   • CHOLECYSTECTOMY  2013    DIVERTICULOSIS AND INTERNAL HEMORRHOIDS   • CORONARY ANGIOPLASTY WITH STENT PLACEMENT  x2   • CORONARY STENT PLACEMENT  2013 and 2014    LAD x4   • CYSTOSCOPY  2016   • HYSTERECTOMY     • OOPHORECTOMY         Social History     Socioeconomic History   • Marital status:    Tobacco Use   • Smoking status: Former Smoker     Packs/day: 2.50     Years: 40.00     Pack years: 100.00     Types: Cigarettes     Quit date:      Years since quittin.5   • Smokeless tobacco: Never Used   • Tobacco comment: CAFFEINE USE:  4- 8OZ GLASSES DAILY/ 2 CUPS COFFEE WKLY   Vaping Use   • Vaping Use: Never used   Substance and Sexual Activity   • Alcohol use: Yes     Alcohol/week: 1.0 standard drink     Types: 1 Glasses of wine per week     Comment: occ   • Drug use: No   • Sexual activity: Defer       Family History   Problem Relation Age of Onset   • Colon cancer Mother    • Coronary artery disease Mother    • Hypertension Mother    • Breast cancer Mother    • Diabetes Mother    • Heart disease Mother    • Stroke Father    • Liver disease Father    • Coronary artery disease Sister    • Hyperlipidemia Sister    • Coronary artery disease Brother    • Lung cancer Brother    • Hypertension Son    • No Known Problems Maternal Grandmother    • No Known Problems Maternal Grandfather    • No Known Problems Paternal Grandmother    • No Known Problems Paternal Grandfather        Review of Systems   Constitutional: Negative for decreased appetite, fever, malaise/fatigue and weight loss.   HENT: Negative for nosebleeds.    Eyes: Negative for double vision.   Cardiovascular: Negative for chest pain, claudication, cyanosis, dyspnea on exertion, irregular heartbeat, leg swelling, near-syncope, orthopnea, palpitations, paroxysmal nocturnal dyspnea and syncope.   Respiratory: Negative for cough, hemoptysis and shortness of breath.    Hematologic/Lymphatic: Negative for bleeding problem.   Skin: Negative for rash.   Musculoskeletal: Negative for falls and myalgias.   Gastrointestinal: Negative for hematochezia, jaundice, melena, nausea and vomiting.   Genitourinary: Negative for hematuria.   Neurological: Negative for dizziness and seizures.   Psychiatric/Behavioral: Negative for altered mental status and memory loss.       Allergies   Allergen Reactions   • Other Swelling     SHELL FISH  With Hives also   • Shellfish-Derived Products Swelling   • Sulfa Antibiotics Swelling   • Contrast Dye Swelling   • Effient [Prasugrel] Hives         Current Outpatient  "Medications:   •  amLODIPine (NORVASC) 5 MG tablet, Take 1 tablet by mouth Daily., Disp: 90 tablet, Rfl: 1  •  aspirin 81 MG chewable tablet, Chew 81 mg Daily., Disp: , Rfl:   •  glucose blood (OneTouch Ultra) test strip, 1 each by Other route See Admin Instructions. Use once to twice daily as needed, Disp: 100 each, Rfl: 5  •  hydroCHLOROthiazide (HYDRODIURIL) 12.5 MG tablet, Take 12.5 mg by mouth Every Night., Disp: , Rfl:   •  isosorbide mononitrate (IMDUR) 30 MG 24 hr tablet, TAKE 1 TABLET BY MOUTH DAILY, Disp: 90 tablet, Rfl: 1  •  losartan (COZAAR) 100 MG tablet, TAKE 1 TABLET BY MOUTH DAILY, Disp: 90 tablet, Rfl: 1  •  metFORMIN ER (GLUCOPHAGE-XR) 500 MG 24 hr tablet, TAKE 1 TABLET BY MOUTH DAILY WITH BREAKFAST, Disp: 90 tablet, Rfl: 1  •  metoprolol succinate XL (TOPROL-XL) 100 MG 24 hr tablet, TAKE 1 TABLET(100 MG) BY MOUTH EVERY DAY, Disp: 90 tablet, Rfl: 1  •  rosuvastatin (CRESTOR) 20 MG tablet, Take 1 tablet by mouth Daily., Disp: 90 tablet, Rfl: 2  •  ticagrelor (Brilinta) 60 MG tablet tablet, Take 1 tablet by mouth Every 12 (Twelve) Hours., Disp: 180 tablet, Rfl: 2      Objective:     Vitals:    07/19/22 1230   BP: 128/76   Pulse: 67   Weight: 94.8 kg (209 lb)   Height: 162.6 cm (64\")     Body mass index is 35.87 kg/m².    Constitutional:       Appearance: Well-developed.   Eyes:      General: No scleral icterus.  HENT:      Head: Normocephalic.   Neck:      Thyroid: No thyromegaly.      Vascular: No JVD.      Lymphadenopathy: No cervical adenopathy.   Pulmonary:      Effort: Pulmonary effort is normal.      Breath sounds: Normal breath sounds. No wheezing. No rales.   Cardiovascular:      Normal rate. Regular rhythm.      No gallop.   Edema:     Peripheral edema absent.   Abdominal:      Palpations: Abdomen is soft.      Tenderness: There is no abdominal tenderness.   Musculoskeletal: Normal range of motion. Skin:     General: Skin is warm and dry.      Findings: No rash.   Neurological:      Mental " Status: Alert and oriented to person, place, and time.           ECG 12 Lead    Date/Time: 7/19/2022 12:47 PM  Performed by: Gigi Dailey MD  Authorized by: Gigi Dailey MD   Comparison: compared with previous ECG   Similar to previous ECG  Rhythm: sinus rhythm    Clinical impression: normal ECG             Assessment:       Diagnosis Plan   1. Coronary artery disease involving native coronary artery of native heart without angina pectoris     2. History of coronary artery stent placement     3. Essential hypertension     4. Type 2 diabetes mellitus with complication, without long-term current use of insulin ([facility])     5. Class 2 severe obesity due to excess calories with serious comorbidity and body mass index (BMI) of 36.0 to 36.9 in adult (HCC)            Plan:       I think she is doing okay no risk modification is critical for her encouraged her to get her weight down under 200 pounds I would like to see that happen within the next year.  I am going to stop her aspirin I do not have the records as to why we have to be on Brilinta it does not sound like she had stent thrombosis.  I will have her come back and see us in a year sooner if she has trouble    As always, it has been a pleasure to participate in your patient's care.      Sincerely,       Gigi Dailey MD                   Never

## 2022-07-25 ENCOUNTER — EMERGENCY (EMERGENCY)
Facility: HOSPITAL | Age: 47
LOS: 1 days | Discharge: HOME | End: 2022-07-25
Attending: EMERGENCY MEDICINE | Admitting: EMERGENCY MEDICINE

## 2022-07-25 VITALS
RESPIRATION RATE: 18 BRPM | HEIGHT: 68 IN | DIASTOLIC BLOOD PRESSURE: 100 MMHG | OXYGEN SATURATION: 100 % | TEMPERATURE: 98 F | HEART RATE: 58 BPM | SYSTOLIC BLOOD PRESSURE: 145 MMHG

## 2022-07-25 PROCEDURE — 99283 EMERGENCY DEPT VISIT LOW MDM: CPT

## 2022-07-25 RX ADMIN — Medication 40 MILLIGRAM(S): at 09:27

## 2022-07-25 NOTE — ED PROVIDER NOTE - OBJECTIVE STATEMENT
The patient is a 47y Male who has a past medical and surgery history of Hypertension Seizure Disorder PTED with pt c/o itching to b/l arms x 2 weeks. tried using topical cream without any improvement. no rash visible to arms. denies fever/chills. no other persons with rash No ocular nailbed  or oral involvement no palms and soles

## 2022-07-25 NOTE — ED PROVIDER NOTE - SKIN, MLM
Skin normal color for race, warm, many small punctate lesions some vesicular none pustular no hand/sole involvment

## 2022-07-25 NOTE — ED ADULT TRIAGE NOTE - CHIEF COMPLAINT QUOTE
pt c/o itching to b/l arms x 2 weeks. tried using topical cream without any improvement. no rash visible to arms. denies fever/chills.

## 2022-07-25 NOTE — ED PROVIDER NOTE - PATIENT PORTAL LINK FT
You can access the FollowMyHealth Patient Portal offered by [facility] by registering at the following website: [email or web address]. By joining [employer]’s FollowMyHealth portal, you will also be able to view your health information using other applications (apps) compatible with our system.

## 2022-07-25 NOTE — ED PROVIDER NOTE - CARE PROVIDER_API CALL
Titi Garcia)  Surgery  [address], [address]  [place], [place] [id number]  Phone: [phone]  Fax: [phone]  Established Patient  Follow Up Time: 4-6 Days

## 2022-07-25 NOTE — ED PROVIDER NOTE - CLINICAL SUMMARY MEDICAL DECISION MAKING FREE TEXT BOX
The patient is a 47y Male who has a past medical and surgery history of Hypertension Seizure Disorder PTED with pt c/o itching to b/l arms x 2 weeks. tried using topical cream without any improvement. no rash visible to arms. denies fever/chills. no other persons with rash No ocular nailbed  or oral involvement no palms and soles   Vital Signs Last 24 Hrs  T(F): 98 HR: 58 BP: 145/100 RR: 18 SpO2: 100% (25 Jul 2022 08:28) (100% - 100%)  PE: as described; my additions and exceptions are noted in the chart    DATA:  EKG: pending at time of evaluation  LAB: Pending at time of evaluation    IMPRESSION/RISK:  Dx=  Differential includes but not limited to conditions listed in order of most possible first:   Consideration include  Plan

## 2022-07-25 NOTE — ED PROVIDER NOTE - CARE PROVIDERS DIRECT ADDRESSES
[email or web address]

## 2022-08-05 RX ORDER — OLMESARTAN MEDOXOMIL 5 MG/1
1 TABLET, FILM COATED ORAL
Qty: 0 | Refills: 0 | DISCHARGE

## 2022-08-11 ENCOUNTER — EMERGENCY (EMERGENCY)
Facility: HOSPITAL | Age: 47
LOS: 0 days | Discharge: HOME | End: 2022-08-11
Attending: STUDENT IN AN ORGANIZED HEALTH CARE EDUCATION/TRAINING PROGRAM

## 2022-08-11 VITALS
HEART RATE: 62 BPM | SYSTOLIC BLOOD PRESSURE: 145 MMHG | DIASTOLIC BLOOD PRESSURE: 82 MMHG | WEIGHT: 182.1 LBS | OXYGEN SATURATION: 98 % | HEIGHT: 68 IN | RESPIRATION RATE: 18 BRPM | TEMPERATURE: 98 F

## 2022-08-11 DIAGNOSIS — H10.9 UNSPECIFIED CONJUNCTIVITIS: ICD-10-CM

## 2022-08-11 DIAGNOSIS — I10 ESSENTIAL (PRIMARY) HYPERTENSION: ICD-10-CM

## 2022-08-11 DIAGNOSIS — H57.89 OTHER SPECIFIED DISORDERS OF EYE AND ADNEXA: ICD-10-CM

## 2022-08-11 PROCEDURE — 99283 EMERGENCY DEPT VISIT LOW MDM: CPT

## 2022-08-11 RX ORDER — ERYTHROMYCIN BASE 5 MG/GRAM
1 OINTMENT (GRAM) OPHTHALMIC (EYE) ONCE
Refills: 0 | Status: COMPLETED | OUTPATIENT
Start: 2022-08-11 | End: 2022-08-11

## 2022-08-11 RX ORDER — CARVEDILOL PHOSPHATE 80 MG/1
1 CAPSULE, EXTENDED RELEASE ORAL
Qty: 0 | Refills: 0 | DISCHARGE

## 2022-08-11 RX ADMIN — Medication 1 APPLICATION(S): at 15:21

## 2022-08-11 NOTE — ED PROVIDER NOTE - CLINICAL SUMMARY MEDICAL DECISION MAKING FREE TEXT BOX
48yo male presenting with L eye itching, discharge.  No change in vision, no pain.  Appears conjunctivitis and with discharge, will treat for bacterial.  Will give ophtho follow up and dc.

## 2022-08-11 NOTE — ED PROVIDER NOTE - NSFOLLOWUPCLINICS_GEN_ALL_ED_FT
[facility]  Ophthalmology  [address] [place] [address], [facility] [address]  [place] [address]  Phone: [phone]  Fax:

## 2022-08-11 NOTE — ED PROVIDER NOTE - PATIENT PORTAL LINK FT
You can access the FollowMyHealth Patient Portal offered by [facility] by registering at the following website: [email or web address]. By joining [employer]’s FollowMyHealth portal, you will also be able to view your health information using other applications (apps) compatible with our system.

## 2022-08-11 NOTE — ED PROVIDER NOTE - OBJECTIVE STATEMENT
48yo male with pmh seizure disorder, htn, presenting with left eye itchiness, discharge.  Starting last night had whitish discharge around L eye.  When he woke up this morning was crusted over.  Washed it away and came to ED.  No change in vision.  Did not take anything for symptoms prior.  Never happened before.  No symptoms R eye.  No fevers, trauma.  No contacts.

## 2022-08-11 NOTE — ED PROVIDER NOTE - NSFOLLOWUPINSTRUCTIONS_ED_ALL_ED_FT
Rest, drink plenty of fluids  Advance activity as tolerated  Continue all previously prescribed medications as directed  Follow up with your PMD - bring copies of your results  Return to the ER for changes in vision, pain, worsening symptoms, or other new or concerning symptoms

## 2022-08-11 NOTE — ED PROVIDER NOTE - PHYSICAL EXAMINATION
General appearance: NAD, conversant, afebrile    Eyes: anicteric sclerae, JUNE, EOMI, visual fields intact, trace L conjunctival injection, no significant discharge, 20/40 OD/ OS   HENT: Atraumatic; oropharynx clear, MMM and no ulcerations, no pharyngeal erythema or exudate   Neck: Trachea midline; Full range of motion, supple   Pulm: normal respiratory effort and no intercostal retractions, normal work of breathing   Extremities: No peripheral edema, no gross deformities, FROM x4   Skin: Dry, normal temperature, turgor and texture; no rash   Psych: Appropriate affect, cooperative

## 2022-08-17 ENCOUNTER — EMERGENCY (EMERGENCY)
Facility: HOSPITAL | Age: 47
LOS: 1 days | Discharge: HOME | End: 2022-08-17
Attending: EMERGENCY MEDICINE | Admitting: EMERGENCY MEDICINE

## 2022-08-17 VITALS
OXYGEN SATURATION: 98 % | RESPIRATION RATE: 15 BRPM | DIASTOLIC BLOOD PRESSURE: 90 MMHG | HEART RATE: 62 BPM | TEMPERATURE: 98 F | SYSTOLIC BLOOD PRESSURE: 185 MMHG | HEIGHT: 68 IN

## 2022-08-17 VITALS
SYSTOLIC BLOOD PRESSURE: 141 MMHG | DIASTOLIC BLOOD PRESSURE: 88 MMHG | HEART RATE: 69 BPM | RESPIRATION RATE: 16 BRPM | OXYGEN SATURATION: 100 %

## 2022-08-17 LAB
ALBUMIN SERPL ELPH-MCNC: 3.9 G/DL — SIGNIFICANT CHANGE UP (ref 3.3–5)
ALP SERPL-CCNC: 87 U/L — SIGNIFICANT CHANGE UP (ref 40–120)
ALT FLD-CCNC: 30 U/L — SIGNIFICANT CHANGE UP (ref 4–41)
ANION GAP SERPL CALC-SCNC: 12 MMOL/L — SIGNIFICANT CHANGE UP (ref 7–14)
APPEARANCE UR: CLEAR — SIGNIFICANT CHANGE UP
AST SERPL-CCNC: 43 U/L — HIGH (ref 4–40)
BACTERIA # UR AUTO: NEGATIVE — SIGNIFICANT CHANGE UP
BASOPHILS # BLD AUTO: 0.03 K/UL — SIGNIFICANT CHANGE UP (ref 0–0.2)
BASOPHILS NFR BLD AUTO: 0.5 % — SIGNIFICANT CHANGE UP (ref 0–2)
BILIRUB SERPL-MCNC: <0.2 MG/DL — SIGNIFICANT CHANGE UP (ref 0.2–1.2)
BILIRUB UR-MCNC: NEGATIVE — SIGNIFICANT CHANGE UP
BUN SERPL-MCNC: 10 MG/DL — SIGNIFICANT CHANGE UP (ref 7–23)
CALCIUM SERPL-MCNC: 9.4 MG/DL — SIGNIFICANT CHANGE UP (ref 8.4–10.5)
CHLORIDE SERPL-SCNC: 103 MMOL/L — SIGNIFICANT CHANGE UP (ref 98–107)
CO2 SERPL-SCNC: 24 MMOL/L — SIGNIFICANT CHANGE UP (ref 22–31)
COLOR SPEC: SIGNIFICANT CHANGE UP
CREAT SERPL-MCNC: 1.29 MG/DL — SIGNIFICANT CHANGE UP (ref 0.5–1.3)
DIFF PNL FLD: NEGATIVE — SIGNIFICANT CHANGE UP
EGFR: 68 ML/MIN/1.73M2 — SIGNIFICANT CHANGE UP
EOSINOPHIL # BLD AUTO: 0.11 K/UL — SIGNIFICANT CHANGE UP (ref 0–0.5)
EOSINOPHIL NFR BLD AUTO: 1.8 % — SIGNIFICANT CHANGE UP (ref 0–6)
EPI CELLS # UR: 0 /HPF — SIGNIFICANT CHANGE UP (ref 0–5)
GLUCOSE SERPL-MCNC: 94 MG/DL — SIGNIFICANT CHANGE UP (ref 70–99)
GLUCOSE UR QL: NEGATIVE — SIGNIFICANT CHANGE UP
HCT VFR BLD CALC: 37.5 % — LOW (ref 39–50)
HGB BLD-MCNC: 11.8 G/DL — LOW (ref 13–17)
IANC: 3.3 K/UL — SIGNIFICANT CHANGE UP (ref 1.8–7.4)
IMM GRANULOCYTES NFR BLD AUTO: 0.2 % — SIGNIFICANT CHANGE UP (ref 0–1.5)
KETONES UR-MCNC: NEGATIVE — SIGNIFICANT CHANGE UP
LEUKOCYTE ESTERASE UR-ACNC: NEGATIVE — SIGNIFICANT CHANGE UP
LYMPHOCYTES # BLD AUTO: 1.94 K/UL — SIGNIFICANT CHANGE UP (ref 1–3.3)
LYMPHOCYTES # BLD AUTO: 32.4 % — SIGNIFICANT CHANGE UP (ref 13–44)
MCHC RBC-ENTMCNC: 23.2 PG — LOW (ref 27–34)
MCHC RBC-ENTMCNC: 31.5 GM/DL — LOW (ref 32–36)
MCV RBC AUTO: 73.8 FL — LOW (ref 80–100)
MONOCYTES # BLD AUTO: 0.59 K/UL — SIGNIFICANT CHANGE UP (ref 0–0.9)
MONOCYTES NFR BLD AUTO: 9.9 % — SIGNIFICANT CHANGE UP (ref 2–14)
NEUTROPHILS # BLD AUTO: 3.3 K/UL — SIGNIFICANT CHANGE UP (ref 1.8–7.4)
NEUTROPHILS NFR BLD AUTO: 55.2 % — SIGNIFICANT CHANGE UP (ref 43–77)
NITRITE UR-MCNC: NEGATIVE — SIGNIFICANT CHANGE UP
NRBC # BLD: 0 /100 WBCS — SIGNIFICANT CHANGE UP (ref 0–0)
NRBC # FLD: 0 K/UL — SIGNIFICANT CHANGE UP (ref 0–0)
PH UR: 6.5 — SIGNIFICANT CHANGE UP (ref 5–8)
PLATELET # BLD AUTO: 287 K/UL — SIGNIFICANT CHANGE UP (ref 150–400)
POTASSIUM SERPL-MCNC: 5.3 MMOL/L — SIGNIFICANT CHANGE UP (ref 3.5–5.3)
POTASSIUM SERPL-SCNC: 5.3 MMOL/L — SIGNIFICANT CHANGE UP (ref 3.5–5.3)
PROT SERPL-MCNC: 7 G/DL — SIGNIFICANT CHANGE UP (ref 6–8.3)
PROT UR-MCNC: NEGATIVE — SIGNIFICANT CHANGE UP
RBC # BLD: 5.08 M/UL — SIGNIFICANT CHANGE UP (ref 4.2–5.8)
RBC # FLD: 15.4 % — HIGH (ref 10.3–14.5)
RBC CASTS # UR COMP ASSIST: 1 /HPF — SIGNIFICANT CHANGE UP (ref 0–4)
SODIUM SERPL-SCNC: 139 MMOL/L — SIGNIFICANT CHANGE UP (ref 135–145)
SP GR SPEC: 1.01 — SIGNIFICANT CHANGE UP (ref 1.01–1.05)
UROBILINOGEN FLD QL: SIGNIFICANT CHANGE UP
WBC # BLD: 5.98 K/UL — SIGNIFICANT CHANGE UP (ref 3.8–10.5)
WBC # FLD AUTO: 5.98 K/UL — SIGNIFICANT CHANGE UP (ref 3.8–10.5)
WBC UR QL: 0 /HPF — SIGNIFICANT CHANGE UP (ref 0–5)

## 2022-08-17 PROCEDURE — 99284 EMERGENCY DEPT VISIT MOD MDM: CPT

## 2022-08-17 RX ORDER — DIAZEPAM 5 MG
5 TABLET ORAL ONCE
Refills: 0 | Status: DISCONTINUED | OUTPATIENT
Start: 2022-08-17 | End: 2022-08-17

## 2022-08-17 RX ORDER — LIDOCAINE 4 G/100G
1 CREAM TOPICAL ONCE
Refills: 0 | Status: COMPLETED | OUTPATIENT
Start: 2022-08-17 | End: 2022-08-17

## 2022-08-17 RX ORDER — IBUPROFEN 200 MG
600 TABLET ORAL ONCE
Refills: 0 | Status: COMPLETED | OUTPATIENT
Start: 2022-08-17 | End: 2022-08-17

## 2022-08-17 RX ORDER — ACETAMINOPHEN 500 MG
650 TABLET ORAL ONCE
Refills: 0 | Status: COMPLETED | OUTPATIENT
Start: 2022-08-17 | End: 2022-08-17

## 2022-08-17 RX ADMIN — Medication 5 MILLIGRAM(S): at 04:21

## 2022-08-17 RX ADMIN — Medication 600 MILLIGRAM(S): at 04:21

## 2022-08-17 RX ADMIN — Medication 650 MILLIGRAM(S): at 04:21

## 2022-08-17 RX ADMIN — LIDOCAINE 1 PATCH: 4 CREAM TOPICAL at 04:22

## 2022-08-17 NOTE — ED ADULT TRIAGE NOTE - CHIEF COMPLAINT QUOTE
alert no distress c/o neck pain radiating to low back and right leg  also c/o abd no N/V/D   no c/o CP SOB or dizziness PMHx HTN seizures

## 2022-08-17 NOTE — ED ADULT NURSE NOTE - NSIMPLEMENTINTERV_GEN_ALL_ED
Implemented All Universal Safety Interventions:  [place] to call system. Call bell, personal items and telephone within reach. Instruct patient to call for assistance. Room bathroom lighting operational. Non-slip footwear when patient is off stretcher. Physically safe environment: no spills, clutter or unnecessary equipment. Stretcher in lowest position, wheels locked, appropriate side rails in place.

## 2022-08-17 NOTE — ED PROVIDER NOTE - OBJECTIVE STATEMENT
47-year-old male with a past medical history of seizure disorder last seizure more than year ago, HTN presents to the ED with right-sided flank pain/back pain. Pain is localized over the right posterior hip and radiates to his upper back and his lower extremity. He describes the pain as a sharp sensation. Pain is worse with palpation over the lower back on the right side paraspinal region. He is able to ambulate without difficulty. No fevers, trauma, IV drug use. No urinary or fecal incontinence. No saddle anesthesia. He denies any other symptoms at bedside.

## 2022-08-17 NOTE — ED PROVIDER NOTE - PHYSICAL EXAMINATION
GENERAL: no acute distress, non-toxic appearing  HEAD: normocephalic, atraumatic  HEENT: normal conjunctiva, oral mucosa moist, neck supple  CARDIAC: regular rate and rhythm, normal S1 and S2,  no appreciable murmurs  PULM: clear to ascultation bilaterally, no crackles, rales, rhonchi, or wheezing  GI: abdomen nondistended, soft, nontender, no guarding or rebound tenderness  : no CVA tenderness, no suprapubic tenderness  NEURO: alert and oriented x 3, normal speech, PERRLA, EOMI, no focal motor or sensory deficits, nonantalgic gait  MSK: no visible deformities, no peripheral edema, calf tenderness/redness/swelling, tenderness to palpation over the posterior R iliac crest region  SKIN: no visible rashes, dry, well-perfused  PSYCH: appropriate mood and affect

## 2022-08-17 NOTE — ED PROVIDER NOTE - ADDITIONAL NOTES AND INSTRUCTIONS:
Please excuse from work/school as he/she was being evaluated in the Emergency Room at [facility].

## 2022-08-17 NOTE — ED PROVIDER NOTE - ATTENDING CONTRIBUTION TO CARE
I performed a face-to-face evaluation of the patient and performed a history and physical examination along with the resident or ACP, and/or medical student above.  I agree with the history and physical examination as documented by the resident or ACP, and/or medical student above.  Sánchez:   48yo M w/ pmh as above, p/w R sided flank/lower back pain w/ rads to R upper back as well as R LE, sharp. On ROS, also reports urinary frequency few days ago (none currently). Denies dysuria/f/c/n/v/cp/sob/rash. no cvat, and no hx of renal stones.  H&P most consisent w/ msk pain but will get labs and ua to r/o uti/pyelo. Meds, reassess.

## 2022-08-17 NOTE — ED ADULT NURSE NOTE - OBJECTIVE STATEMENT
Break RN note- patient arrives to the ED for neck pain radiating to the lower back and down the right leg x4 days. A&Ox4. Patient denies any headache, chest pain, dizziness, SOB, nausea, vomiting, hematuria, hematochezia, diarrhea, constipation, or other complaints. Belly soft, nondistended, but tender to touch. Patient breathing even and nonlabored. Positive CVA tenderness to right side. Patient appears comfortable. Safety maintained. Awaiting further MD instruction. Patient stable upon exiting the room.

## 2022-08-17 NOTE — ED PROVIDER NOTE - CLINICAL SUMMARY MEDICAL DECISION MAKING FREE TEXT BOX
47-year-old male with a past medical history of seizure disorder last seizure more than year ago, HTN presents to the ED with right-sided flank pain/back pain. vitals on actionable. PE as noted above. no red flag symptoms as per hpi. the differential diagnoses includes, but not limited to: msk vs radiculopathy pain vs sciatic pain. will order labs, imaging, ekg, meds, reassess

## 2022-08-17 NOTE — ED PROVIDER NOTE - PATIENT PORTAL LINK FT
You can access the FollowMyHealth Patient Portal offered by [facility] by registering at the following website: [email or web address]. By joining [employer]’s FollowMyHealth portal, you will also be able to view your health information using other applications (apps) compatible with our system.

## 2022-08-17 NOTE — ED PROVIDER NOTE - NSFOLLOWUPINSTRUCTIONS_ED_ALL_ED_FT
-You were seen in the Emergency Department (ED) for back pain. Lab and imaging results, if performed, were discussed with you along with your discharge diagnosis.    FOLLOW-UP:  -Please follow up with your PMD if symptoms return or for any concerning matter pertaining to your back pain.  -Please follow up with your private physician within the next 72 hours. Tell them you were recently in the ED for an urgent issue and would like to be seen. Bring copies of your results if you were given.   -If you do not have a PMD, please call [phone] to find one convenient for you or call our clinic at [phone].    MEDICATIONS:  -Continue all other prescribed medicine, IF ANY, as per your primary care doctor's (PMD) recommendations.    PAIN CONTROL:  -Please take over the counter Tylenol (also known as acetaminophen) 650mg every 6 hours or Ibuprofen (also known as motrin, advil) 600mg every 8 hour for your pain, IF ANY, unless you are not supposed to for any reason.  -Rest, stay hydrated with plenty of fluids (drink at least 2 Liters or 64 Ounces of water each day UNLESS you are supposed to restrict fluids or ANY reason.    RETURN PRECAUTIONS:  -Please return to the Emergency Department if you experience ANY new or concerning symptoms, such as, but not limited to: worsening pain, large amount of bleeding, passing out, fever >100.F, shaking chills, inability to see or new double vision, chest pain, difficulty breathing, diffuse abdominal pain, unable to eat or drink, continuous vomiting or diarrhea, unable to move or feel part of your body

## 2022-08-29 NOTE — ED PROVIDER NOTE - DATE/TIME 1
25-Aug-2021 16:00 Cartilage Graft Text: The defect edges were debeveled with a #15 scalpel blade.  Given the location of the defect, shape of the defect, the fact the defect involved a full thickness cartilage defect a cartilage graft was deemed most appropriate.  An appropriate donor site was identified, cleansed, and anesthetized. The cartilage graft was then harvested and transferred to the recipient site, oriented appropriately and then sutured into place.  The secondary defect was then repaired using a primary closure.

## 2022-11-07 NOTE — ED ADULT TRIAGE NOTE - SOURCE OF INFORMATION
Patient Bcc Adenoid Histology Text: There were aggregates of basaloid cells demonstrating an adenoid or cribiform pattern.

## 2022-12-22 ENCOUNTER — EMERGENCY (EMERGENCY)
Facility: HOSPITAL | Age: 47
LOS: 1 days | Discharge: HOME | End: 2022-12-22
Attending: EMERGENCY MEDICINE | Admitting: EMERGENCY MEDICINE

## 2022-12-22 VITALS
OXYGEN SATURATION: 100 % | TEMPERATURE: 98 F | HEART RATE: 65 BPM | DIASTOLIC BLOOD PRESSURE: 99 MMHG | RESPIRATION RATE: 17 BRPM | SYSTOLIC BLOOD PRESSURE: 145 MMHG

## 2022-12-22 LAB
FLUAV AG NPH QL: DETECTED
FLUBV AG NPH QL: SIGNIFICANT CHANGE UP
RSV RNA NPH QL NAA+NON-PROBE: SIGNIFICANT CHANGE UP
SARS-COV-2 RNA SPEC QL NAA+PROBE: SIGNIFICANT CHANGE UP

## 2022-12-22 PROCEDURE — 99284 EMERGENCY DEPT VISIT MOD MDM: CPT

## 2022-12-22 NOTE — ED PROVIDER NOTE - CLINICAL SUMMARY MEDICAL DECISION MAKING FREE TEXT BOX
Patient with history of hypertension and seizure disorder presents emergency department 2 complaints, first has been having viral URI symptoms for 3 days with fevers, rhinorrhea, cough.  Has been taking DayQuil and NyQuil.  He also had left upper gingival swelling  for 2 days, improved today after spontaneous drainage at home of what appears to be a left upper maxillary abscess. No indication for dental consult given already spontaneously draining and minimal swelling. Currently no facial swelling, no airway issues, no p.o. intolerance, abscess already spontaneously draining at this point and does not need incision and drainage.  Will place on Augmentin, give return precautions and dental follow-up.

## 2022-12-22 NOTE — ED ADULT TRIAGE NOTE - CHIEF COMPLAINT QUOTE
Pt c/o flu-like symptoms X couple of days. Also endorsing tooth pain to L side of mouth, says mouth is swollen. C/o fevers/chills. TMAX 101. Phx: HTN, seizure

## 2022-12-22 NOTE — ED PROVIDER NOTE - NSFOLLOWUPINSTRUCTIONS_ED_ALL_ED_FT
Arrives with cc of \"blurry vision on my LEFT side and dizzy spells. \" +head tightness across head and felt panic en route to ED. Last known well 10:50pm. Called neuro Interventional who referred to ED. Blurred vision resolved at this time but continues to feel dizzy. Takes lacey    R brain stent 11/5/20 for an aneurysm. Is to have a 2ND stent 12/7/20 for LEFT side aneursyms.      Hx of vertigo    Neuro interventional: Dr. Ruben Colbert  Sx performed by Dr. Jose Armando Bustamante
You were tested for the flu, covid and other seasonal viruses. Your results will be texted to you later today or tomorrow.  Rest and stay hydrated. You can take ibuprofen (motrin/advil) up to 600mg every 8 hours. If you have persistent fever or pains after taking ibuprofen, you can add tylenol- 1,000mg (2 extra strength) every 6 hours. Acetaminophen is a another name for tylenol and is often included in over the counter cold and sinus medications so please check if other medications you are taking including acetaminophen to ensure you do not exceed 4,000mg of tylenol in a 24 hours period as this may be toxic to your liver.  There are no medications to treat viral infections, your body's immune system will fight off the virus.    You have a draining dental abscess that does not currently require an incision and drainage and you were given a prescription for antibiotics to treat the residual infection. Please make a follow up appointment with a dentist for further care. You can call  to schedule an appointment.    Please return to the ER if you have facial swelling, intraoral swelling, persistent fevers, difficult breathing or swallowing, severe chest pain,  or any new or concerning symptoms to you.

## 2022-12-22 NOTE — ED ADULT NURSE NOTE - OBJECTIVE STATEMENT
Patient is a 46 yo male, h/x HTN, seizures, presenting with flu-like symptoms and L gum pain/swelling x 2-3 days. AAOx4, no signs of distress, endorsing fever, chills, cough, sore throat and runny nose at home. Also endorsing L gum pain/swelling which started draining this morning. Denies chest pain, shortness of breath, nausea, vomiting, diarrhea. Nasal swab sent per orders. Discharged by provider. Fall precautions maintained.

## 2022-12-22 NOTE — ED PROVIDER NOTE - PATIENT PORTAL LINK FT
You can access the FollowMyHealth Patient Portal offered by [facility] by registering at the following website: [email or web address]. By joining [employer]’s FollowMyHealth portal, you will also be able to view your health information using other applications (apps) compatible with our system.

## 2022-12-22 NOTE — ED PROVIDER NOTE - PHYSICAL EXAMINATION
GEN - NAD; well appearing; A+O x3   HEAD - NC/AT   EYES- PERRL, EOMI  ENT: Airway patent, mmm, OP clear  DENTAL: mild swelling to L upper maxillary gingival area on hard palate side, spontaneously draining small amount of purulent material- all fluid expressed completely   NECK: Neck supple  PULMONARY - CTA b/l, symmetric breath sounds.   CARDIAC -s1s2, RRR, no M,G,R  ABDOMEN - +BS, ND, NT, soft, no guarding, no rebound, no masses   BACK - no CVA tenderness, Normal  spine   EXTREMITIES - FROM,  NEUROLOGIC - alert, speech clear  PSYCH -nl mood/affect, nl insight.

## 2022-12-22 NOTE — ED PROVIDER NOTE - OBJECTIVE STATEMENT
47-year-old male history of hypertension and seizure disorder presents to the emergency department with 3 days of rhinorrhea, fevers, sore throat, cough, chest pain with coughing.  He also notes 2 days of left upper gum swelling, already spontaneously draining fluid with improvement in swelling over the last 24 hours.

## 2023-01-06 ENCOUNTER — APPOINTMENT (OUTPATIENT)
Dept: NEUROLOGY | Facility: CLINIC | Age: 48
End: 2023-01-06
Payer: COMMERCIAL

## 2023-01-06 VITALS
HEART RATE: 55 BPM | DIASTOLIC BLOOD PRESSURE: 84 MMHG | SYSTOLIC BLOOD PRESSURE: 131 MMHG | WEIGHT: 182 LBS | HEIGHT: 68 IN | BODY MASS INDEX: 27.58 KG/M2

## 2023-01-06 DIAGNOSIS — H57.11 OCULAR PAIN, RIGHT EYE: ICD-10-CM

## 2023-01-06 PROCEDURE — 99214 OFFICE O/P EST MOD 30 MIN: CPT

## 2023-01-06 NOTE — ASSESSMENT
[FreeTextEntry1] : 48 yo RH man with long-standing history of epilepsy. 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 Continue Depakote ER 500mg PO BID\par 2. Labs: VPA level, CBC, CMP\par 3. Seizure precautions discussed\par 4. Neck pain: PT\par 5. Ophtho consult re: right eye pain\par 6. Patient agrees with plan.\par 7. Follow up in 6 months.\par \par Randal Wade MD\par [facility]\par \par Greater than 50% of the encounter was spent on counseling and coordination of care discussing differential diagnosis, diagnostic testing, and treatment options. We have talked about appropriate follow up, and I have spent 25 minutes of face to face time with the patient.

## 2023-01-06 NOTE — PHYSICAL EXAM
[FreeTextEntry1] : Awake, alert, oriented x 3. Language fluent. Comprehension intact. Naming intact. Repetition intact. Affect normal. Cranial nerves grossly intact. Motor exam: normal bulk, normal tone, 5/5 in all four extremities. No tremors or fasciculations. Sensory exam: intact to LT/PP/LUIS/vibration. DTRs: 2+ throughout, flexor plantar response bilaterally, no clonus. Coordination: no dysmetria. Gait: normal toe/heel/tandem gait. Romberg - negative

## 2023-01-06 NOTE — HISTORY OF PRESENT ILLNESS
[FreeTextEntry1] : 48 yo RH man with long-standing history of epilepsy. 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

## 2023-02-22 NOTE — ED PROVIDER NOTE - IV ALTEPLASE ADMIN OUTSIDE HIDDEN
Detail Level: Detailed Add 14303 Cpt? (Important Note: In 2017 The Use Of 59530 Is Being Tracked By Cms To Determine Future Global Period Reimbursement For Global Periods): yes show

## 2023-03-09 ENCOUNTER — EMERGENCY (EMERGENCY)
Facility: HOSPITAL | Age: 48
LOS: 1 days | Discharge: HOME | End: 2023-03-09
Attending: STUDENT IN AN ORGANIZED HEALTH CARE EDUCATION/TRAINING PROGRAM | Admitting: STUDENT IN AN ORGANIZED HEALTH CARE EDUCATION/TRAINING PROGRAM
Payer: COMMERCIAL

## 2023-03-09 VITALS
TEMPERATURE: 98 F | OXYGEN SATURATION: 100 % | HEART RATE: 63 BPM | DIASTOLIC BLOOD PRESSURE: 99 MMHG | RESPIRATION RATE: 20 BRPM | SYSTOLIC BLOOD PRESSURE: 139 MMHG

## 2023-03-09 VITALS
SYSTOLIC BLOOD PRESSURE: 163 MMHG | DIASTOLIC BLOOD PRESSURE: 103 MMHG | TEMPERATURE: 99 F | HEART RATE: 72 BPM | RESPIRATION RATE: 20 BRPM | OXYGEN SATURATION: 100 %

## 2023-03-09 LAB
ALBUMIN SERPL ELPH-MCNC: 3.9 G/DL — SIGNIFICANT CHANGE UP (ref 3.3–5)
ALP SERPL-CCNC: 84 U/L — SIGNIFICANT CHANGE UP (ref 40–120)
ALT FLD-CCNC: 25 U/L — SIGNIFICANT CHANGE UP (ref 4–41)
ANION GAP SERPL CALC-SCNC: 12 MMOL/L — SIGNIFICANT CHANGE UP (ref 7–14)
APTT BLD: 30.1 SEC — SIGNIFICANT CHANGE UP (ref 27–36.3)
AST SERPL-CCNC: 21 U/L — SIGNIFICANT CHANGE UP (ref 4–40)
BASOPHILS # BLD AUTO: 0.03 K/UL — SIGNIFICANT CHANGE UP (ref 0–0.2)
BASOPHILS NFR BLD AUTO: 0.4 % — SIGNIFICANT CHANGE UP (ref 0–2)
BILIRUB SERPL-MCNC: 0.2 MG/DL — SIGNIFICANT CHANGE UP (ref 0.2–1.2)
BUN SERPL-MCNC: 11 MG/DL — SIGNIFICANT CHANGE UP (ref 7–23)
CALCIUM SERPL-MCNC: 8.9 MG/DL — SIGNIFICANT CHANGE UP (ref 8.4–10.5)
CHLORIDE SERPL-SCNC: 104 MMOL/L — SIGNIFICANT CHANGE UP (ref 98–107)
CO2 SERPL-SCNC: 23 MMOL/L — SIGNIFICANT CHANGE UP (ref 22–31)
CREAT SERPL-MCNC: 1.34 MG/DL — HIGH (ref 0.5–1.3)
EGFR: 65 ML/MIN/1.73M2 — SIGNIFICANT CHANGE UP
EOSINOPHIL # BLD AUTO: 0.15 K/UL — SIGNIFICANT CHANGE UP (ref 0–0.5)
EOSINOPHIL NFR BLD AUTO: 1.9 % — SIGNIFICANT CHANGE UP (ref 0–6)
GLUCOSE SERPL-MCNC: 98 MG/DL — SIGNIFICANT CHANGE UP (ref 70–99)
HCT VFR BLD CALC: 39.7 % — SIGNIFICANT CHANGE UP (ref 39–50)
HGB BLD-MCNC: 12 G/DL — LOW (ref 13–17)
IANC: 4.32 K/UL — SIGNIFICANT CHANGE UP (ref 1.8–7.4)
IMM GRANULOCYTES NFR BLD AUTO: 0.2 % — SIGNIFICANT CHANGE UP (ref 0–0.9)
INR BLD: 1.01 RATIO — SIGNIFICANT CHANGE UP (ref 0.88–1.16)
LIDOCAIN IGE QN: 42 U/L — SIGNIFICANT CHANGE UP (ref 7–60)
LYMPHOCYTES # BLD AUTO: 2.47 K/UL — SIGNIFICANT CHANGE UP (ref 1–3.3)
LYMPHOCYTES # BLD AUTO: 30.8 % — SIGNIFICANT CHANGE UP (ref 13–44)
MCHC RBC-ENTMCNC: 22.6 PG — LOW (ref 27–34)
MCHC RBC-ENTMCNC: 30.2 GM/DL — LOW (ref 32–36)
MCV RBC AUTO: 74.8 FL — LOW (ref 80–100)
MONOCYTES # BLD AUTO: 1.02 K/UL — HIGH (ref 0–0.9)
MONOCYTES NFR BLD AUTO: 12.7 % — SIGNIFICANT CHANGE UP (ref 2–14)
NEUTROPHILS # BLD AUTO: 4.32 K/UL — SIGNIFICANT CHANGE UP (ref 1.8–7.4)
NEUTROPHILS NFR BLD AUTO: 54 % — SIGNIFICANT CHANGE UP (ref 43–77)
NRBC # BLD: 0 /100 WBCS — SIGNIFICANT CHANGE UP (ref 0–0)
NRBC # FLD: 0 K/UL — SIGNIFICANT CHANGE UP (ref 0–0)
NT-PROBNP SERPL-SCNC: 7 PG/ML — SIGNIFICANT CHANGE UP
PLATELET # BLD AUTO: 237 K/UL — SIGNIFICANT CHANGE UP (ref 150–400)
POTASSIUM SERPL-MCNC: 4.2 MMOL/L — SIGNIFICANT CHANGE UP (ref 3.5–5.3)
POTASSIUM SERPL-SCNC: 4.2 MMOL/L — SIGNIFICANT CHANGE UP (ref 3.5–5.3)
PROT SERPL-MCNC: 7.5 G/DL — SIGNIFICANT CHANGE UP (ref 6–8.3)
PROTHROM AB SERPL-ACNC: 11.7 SEC — SIGNIFICANT CHANGE UP (ref 10.5–13.4)
RBC # BLD: 5.31 M/UL — SIGNIFICANT CHANGE UP (ref 4.2–5.8)
RBC # FLD: 15.4 % — HIGH (ref 10.3–14.5)
SARS-COV-2 RNA SPEC QL NAA+PROBE: SIGNIFICANT CHANGE UP
SODIUM SERPL-SCNC: 139 MMOL/L — SIGNIFICANT CHANGE UP (ref 135–145)
TROPONIN T, HIGH SENSITIVITY RESULT: <6 NG/L — SIGNIFICANT CHANGE UP
WBC # BLD: 8.01 K/UL — SIGNIFICANT CHANGE UP (ref 3.8–10.5)
WBC # FLD AUTO: 8.01 K/UL — SIGNIFICANT CHANGE UP (ref 3.8–10.5)

## 2023-03-09 PROCEDURE — 71046 X-RAY EXAM CHEST 2 VIEWS: CPT | Mod: 26

## 2023-03-09 PROCEDURE — 74174 CTA ABD&PLVS W/CONTRAST: CPT | Mod: 26,MA

## 2023-03-09 PROCEDURE — 99285 EMERGENCY DEPT VISIT HI MDM: CPT

## 2023-03-09 PROCEDURE — 71275 CT ANGIOGRAPHY CHEST: CPT | Mod: 26,MA

## 2023-03-09 RX ORDER — CEFPODOXIME PROXETIL 100 MG
200 TABLET ORAL ONCE
Refills: 0 | Status: COMPLETED | OUTPATIENT
Start: 2023-03-09 | End: 2023-03-09

## 2023-03-09 RX ORDER — CEFPODOXIME PROXETIL 100 MG
1 TABLET ORAL
Qty: 10 | Refills: 0
Start: 2023-03-09 | End: 2023-03-13

## 2023-03-09 RX ORDER — MORPHINE SULFATE 50 MG/1
4 CAPSULE, EXTENDED RELEASE ORAL ONCE
Refills: 0 | Status: DISCONTINUED | OUTPATIENT
Start: 2023-03-09 | End: 2023-03-09

## 2023-03-09 RX ORDER — KETOROLAC TROMETHAMINE 30 MG/ML
15 SYRINGE (ML) INJECTION ONCE
Refills: 0 | Status: DISCONTINUED | OUTPATIENT
Start: 2023-03-09 | End: 2023-03-09

## 2023-03-09 RX ORDER — AZITHROMYCIN 500 MG/1
1 TABLET, FILM COATED ORAL
Qty: 5 | Refills: 0
Start: 2023-03-09 | End: 2023-03-13

## 2023-03-09 RX ORDER — AZITHROMYCIN 500 MG/1
500 TABLET, FILM COATED ORAL ONCE
Refills: 0 | Status: COMPLETED | OUTPATIENT
Start: 2023-03-09 | End: 2023-03-09

## 2023-03-09 RX ADMIN — Medication 15 MILLIGRAM(S): at 07:27

## 2023-03-09 RX ADMIN — Medication 15 MILLIGRAM(S): at 08:23

## 2023-03-09 RX ADMIN — MORPHINE SULFATE 4 MILLIGRAM(S): 50 CAPSULE, EXTENDED RELEASE ORAL at 07:28

## 2023-03-09 RX ADMIN — Medication 200 MILLIGRAM(S): at 11:56

## 2023-03-09 RX ADMIN — AZITHROMYCIN 500 MILLIGRAM(S): 500 TABLET, FILM COATED ORAL at 11:56

## 2023-03-09 RX ADMIN — MORPHINE SULFATE 4 MILLIGRAM(S): 50 CAPSULE, EXTENDED RELEASE ORAL at 06:47

## 2023-03-09 NOTE — ED PROVIDER NOTE - PHYSICAL EXAMINATION
Vitals: I have reviewed the patients vital signs  General: Well dressed, well appearing, no acute distress  HEENT: Atraumatic, normocephalic, airway patent  Eyes: EOMI, tracking appropriately  Neck: no tracheal deviation, no JVD  Chest/Lungs: no trauma, symmetric chest rise, speaking in complete sentences, no WOB  Heart: skin and extremities well perfused, regular rate and rhythm, 2+ bilateral pulses  Neuro: A+Ox3, ambulating without difficulty, CN grossly intact  MSK: strength at baseline in all extremities, no muscle wasting or atrophy  Skin: no cyanosis, no jaundice, no new emergent lesions   Abd: minimally tender epigastrum   Back: paraspinal upper back pain

## 2023-03-09 NOTE — ED PROVIDER NOTE - PROGRESS NOTE DETAILS
stevie VINSON PGY-1: Received signout on patient.  47-year-old male history of seizure disorder, hypertension presenting with chest pain that radiates to the belly and back.  Neurologically intact.  Found to be influenza positive today.  EKG with flipped T waves, positive family history of early cardiac death.  Ruling out aortic pathology such as AAA at this time.  Chest x-ray not showing widened mediastinum.  Awaiting CT aorta chest abdomen pelvis.  Will discuss with patient after imaging results, likely discharge with cards if patient amenable. stevie VINSON PGY-1: Patient appearing well at this time.  No acute pathology noted on CT chest abdomen pelvis aorta. A new 6 mm nodule in the right middle lobe.   Initial thought was bilateral pneumonia, atelectatic on CT.  We will have him follow-up with primary to make sure this does not become pneumonia.  We will give him cardiac discharge center follow-up.  We will give him numbers for pulmonology. discussed nodule with patient.

## 2023-03-09 NOTE — ED ADULT NURSE NOTE - OBJECTIVE STATEMENT
Pt A&Ox4 ambulatory, PMH Seizures, HTN, presenting to the ED (RM 13) c/o chest pain. Pt states developed chest pain Sunday and states pain gradually got worse. Pt states chest pain initially started on L side and now pain is radiating to back and abdomen. Pt states chest pain intensifies upon laying and moving around. Pt endorses heaviness to chest area and SOB. States has been taking OTC without relief. Pt states went to  and was informed to come to the ED due to abnormal EKG. Denies any other symptom at this moment. Respirations are even and unlabored, pt placed on CM, VS noted, 20G IV L forearm, labs sent, MD at bedside for evaluation, Safety precautions implemented as per protocol, awaiting further MD orders, will continue with plan of care.

## 2023-03-09 NOTE — ED PROVIDER NOTE - NSFOLLOWUPINSTRUCTIONS_ED_ALL_ED_FT
- *****    - Your testing/exams was/were reassuring that dangerous emergencies/conditions are less likely to be occurring or to have occurred.    - Take all medications as directed.    - For pain or fever you can take ibuprofen (motrin, advil) or acetaminophen (tylenol) as needed, as directed on packaging.  - Follow up with your primary doctor within 5 days as directed.  - If you had labs or imaging done, you were given copies of all labs and/or imaging results from your er visit--please take them with you to your follow up appointments.  - If needed, call patient access services at [phone] to find a primary care physician (PCP). Call this number to follow up with a specialty service, such as the spine clinic. If you need this, call and say you were recently in the emergency department and you are calling, per my orders.   - Make sure you do not require a primary care physician's referral if you make a specialty clinic appointment directly. Some insurance requires you to see your PCP, get a referral, then make a specialty appointment. -   You came in for chest pain.  Your EKG was unremarkable.  The lab work that we looked to see if your heart is being damaged it was unremarkable as well.  At this time, it does not appear that your heart is being injured.  I believe that you should go see a cardiologist because it is about that time that you need to follow-up and get additional testing to actually visualize your heart.  I will have our people call you to make that appointment.  I am including information  in this packet for pulmonology to discuss the nodule in your lungs.  At the minimum, you should have a repeat CT or CAT scan in 6 months to see if this changes.  Reasons to come back to the emergency room include but are not limited to chest pain shortness of breath, confusion dizziness lethargy.  Please follow-up with your primary care physician to make sure that you do not develop pneumonia given that you had atelectasis, or congestion, in your lungs.     - Your testing/exams was/were reassuring that dangerous emergencies/conditions are less likely to be occurring or to have occurred.    - Take all medications as directed.    - For pain or fever you can take ibuprofen (motrin, advil) or acetaminophen (tylenol) as needed, as directed on packaging.  - Follow up with your primary doctor within 5 days as directed.  - If you had labs or imaging done, you were given copies of all labs and/or imaging results from your er visit--please take them with you to your follow up appointments.  - If needed, call patient access services at [phone] to find a primary care physician (PCP). Call this number to follow up with a specialty service, such as the spine clinic. If you need this, call and say you were recently in the emergency department and you are calling, per my orders.   - Make sure you do not require a primary care physician's referral if you make a specialty clinic appointment directly. Some insurance requires you to see your PCP, get a referral, then make a specialty appointment.

## 2023-03-09 NOTE — ED ADULT NURSE REASSESSMENT NOTE - NS ED NURSE REASSESS COMMENT FT1
patient received from previous RN. pt reporting decrease in chest pain since arrival to ED. NSR on cardiac monitor. denies sob. respirations even, nonlabored. safety and comfort maintained. no requests at this time. pending CT results.

## 2023-03-09 NOTE — ED PROVIDER NOTE - OBJECTIVE STATEMENT
47-year-old male history of seizure disorder (last seizure over 2 years ago), hypertension on medications does not member the name presenting now for chest back and abdominal pain.  Started as chest pain on Sunday, constant, pressure-like, now radiates to both shoulders, into abd. Worse when moving, leaning forward. No sob, leg swelling, vomiting, fevers. Non smoker, fam hx "heart problem" in brother appx his age.

## 2023-03-09 NOTE — ED ADULT TRIAGE NOTE - CHIEF COMPLAINT QUOTE
chest pain and SOB since Sunday. Pain is radiating to the back abdomen. denies dizziness or nausea or vomiting. PMH of HTN, Seizure. Pt went to urgent care Yesterday who told him to come to ER as his EKG is abnormal.

## 2023-03-09 NOTE — ED PROVIDER NOTE - CLINICAL SUMMARY MEDICAL DECISION MAKING FREE TEXT BOX
46 y/o m hx htn, seizure disorder, presenting now with 3 days of chest/upper abd pain and bilat shoulder pain that is worse with movement, refractory to motrin/tylenol. Here on exam comfortable appearing, minimally tender epigastrum, some bilateral paraspinal pain. no f/c, leg swelling, syncope. no prev cardiac eval. 2+ pulses. consider ACS, hepatobiliary, gastritis, msk pain, pericarditis. however with pain from chest->back and htn concern for dissection that cannot be ruled out without imaging will get cta. dispo pending results.

## 2023-03-09 NOTE — ED PROVIDER NOTE - NSFOLLOWUPCLINICS_GEN_ALL_ED_FT
[facility]monol[facility] and [facility]  Pulmonology  [address], [address]  [place] [address]  Phone: [phone]  Fax:     [facility]  Pulmonary Medicine  [address], [address]  [place] [address]  Phone: [phone]  Fax:

## 2023-03-09 NOTE — ED PROVIDER NOTE - ATTENDING CONTRIBUTION TO CARE
I have personally performed a history and physical examination of the patient and discussed management with the resident as well as the patient.  I reviewed the resident's note and agree with the documented findings and plan of care.  I have authored and modified critical sections of the Provider Note, including but not limited to HPI, Physical Exam and MDM.    47-year-old male history of seizure disorder (last seizure over 2 years ago), hypertension on medications does not member the name presenting now for chest back and abdominal pain.  Started as chest pain on Sunday, constant, pressure-like, now radiates to both shoulders, into abd. Worse when moving, leaning forward.  Likely musculoskeletal versus low suspicion ACS.  Independent review of EKG shows normal sinus rhythm with no STEMI or TWI.  Obtain CBC, CMP, TNI.  Consider pancreatitis, obtain lipase.  Considered dissection given description of patient's symptoms, will obtain CTA.  Symptomatic control as needed.

## 2023-03-09 NOTE — ED PROVIDER NOTE - PATIENT PORTAL LINK FT
You can access the FollowMyHealth Patient Portal offered by [facility] by registering at the following website: [email or web address]. By joining [employer]’s FollowMyHealth portal, you will also be able to view your health information using other applications (apps) compatible with our system.

## 2023-03-22 ENCOUNTER — APPOINTMENT (OUTPATIENT)
Dept: PULMONOLOGY | Facility: CLINIC | Age: 48
End: 2023-03-22
Payer: COMMERCIAL

## 2023-03-22 ENCOUNTER — APPOINTMENT (OUTPATIENT)
Dept: PULMONOLOGY | Facility: CLINIC | Age: 48
End: 2023-03-22

## 2023-03-22 VITALS — SYSTOLIC BLOOD PRESSURE: 151 MMHG | DIASTOLIC BLOOD PRESSURE: 77 MMHG | OXYGEN SATURATION: 97 % | HEART RATE: 75 BPM

## 2023-03-22 DIAGNOSIS — R09.1 PLEURISY: ICD-10-CM

## 2023-03-22 DIAGNOSIS — R07.89 OTHER CHEST PAIN: ICD-10-CM

## 2023-03-22 DIAGNOSIS — J33.9 NASAL POLYP, UNSPECIFIED: ICD-10-CM

## 2023-03-22 DIAGNOSIS — R06.02 SHORTNESS OF BREATH: ICD-10-CM

## 2023-03-22 LAB — POCT - HEMOGLOBIN (HGB), QUANTITATIVE, TRANSCUTANEOUS: 13.7

## 2023-03-22 PROCEDURE — 94060 EVALUATION OF WHEEZING: CPT

## 2023-03-22 PROCEDURE — 94729 DIFFUSING CAPACITY: CPT

## 2023-03-22 PROCEDURE — 95012 NITRIC OXIDE EXP GAS DETER: CPT

## 2023-03-22 PROCEDURE — 94727 GAS DIL/WSHOT DETER LNG VOL: CPT

## 2023-03-22 PROCEDURE — 94618 PULMONARY STRESS TESTING: CPT

## 2023-03-22 PROCEDURE — 99205 OFFICE O/P NEW HI 60 MIN: CPT | Mod: 25

## 2023-03-22 PROCEDURE — 88738 HGB QUANT TRANSCUTANEOUS: CPT

## 2023-03-22 NOTE — PROCEDURE
[FreeTextEntry1] : Data review\par Chest x-ray March 9, 2023\par Bibasilar atelectasis\par MRI small lung volumes fair inspiratory effort\par CT chest angiogram protocol March 9, 2023 with comparison to study of 9/27/2021\par Patent central airways\par Bibasilar atelectasis flat 6 mm nodule right middle lobe associate with the minor fissure\par Trace right pleural effusion\par No pneumothorax\par Negative pulmonary embolism\par Asymmetric few prominent lymph nodes right axillary subpectoral region up to 1.2 cm short axis decreased in size compared to prior exam\par Impression new 6 mm flat pulmonary nodule right lower lobe compared to study from September 27, 2021\par As per radiology recommendation short-term interval follow-up 6 months\par \par PFT March 22, 2023\par Spirometry normal\par Lung volumes normal\par TLC 81% predicted\par Diffusion 75% predicted\par Hemoglobin 13.7\par Normal study\par Pulmonary 6-minute walk exercise study\par Indication shortness of breath\par Baseline O2 saturation 97%\par Impression normal study\par No samantha desaturation\par \par Blood draw

## 2023-03-22 NOTE — DISCUSSION/SUMMARY
[FreeTextEntry1] : Atypical chest discomfort no chronic pleuritic type of pain pleurisy chronic costochondritis\par No other stigmata for autoimmune disease\par Blood work to include baseline sed rate CRP VAN rheumatoid factor because he does complain of some inflammatory arthritic type changes\par \par New 6 mm pulmonary nodule flat right middle lobe\par \par Intrapulmonary lymph node\par Rule out postinflammatory pulmonary nodule\par Associated discoid atelectasis\par \par Recommendations\par Discussed treating the symptoms possibly a chronic costochondritis but patient prefers to hold off\par We have offered a 6 short course of oral steroids\par Anti-inflammatories Tylenol arthritis was discussed discussed as well given colchicine 0.6 mg can be an anti-inflammatory\par \par Reviewed CT chest all questions answered\par Recommended based on natural history of lesion would recommend that he have a repeat CT chest in 6 months and then if stable 1 year follow-up for determine stability and favor benign nodule\par

## 2023-03-22 NOTE — REVIEW OF SYSTEMS
[Chest Discomfort] : chest discomfort [Arthralgias] : arthralgias [Seizures] : seizures [Depression] : no depression [Anxiety] : no anxiety [Diabetes] : no diabetes [Thyroid Problem] : no thyroid problem [Negative] : Hematologic [TextBox_30] : HPI [TextBox_44] : Hypertension [TextBox_91] : Joint pains

## 2023-03-22 NOTE — REASON FOR VISIT
[Abnormal CXR/ Chest CT] : an abnormal CXR/ chest CT [Initial] : an initial visit [TextBox_44] : Pulmonary  nodule, bibasilar  atelectasis

## 2023-03-22 NOTE — PHYSICAL EXAM
[No Acute Distress] : no acute distress [Normal Oropharynx] : normal oropharynx [I] : Mallampati Class: I [Normal Appearance] : normal appearance [Supple] : supple [No JVD] : no jvd [Normal Rate/Rhythm] : normal rate/rhythm [Normal S1, S2] : normal s1, s2 [No Murmurs] : no murmurs [No Rubs] : no rubs [No Gallops] : no gallops [No Resp Distress] : no resp distress [No Acc Muscle Use] : no acc muscle use [Normal Palpation] : normal palpation [Normal Rhythm and Effort] : normal rhythm and effort [Clear to Auscultation Bilaterally] : clear to auscultation bilaterally [Normal to Percussion] : normal to percussion [Not Tender] : not tender [No Masses] : no masses [Soft] : soft [No HSM] : no hsm [Normal Gait] : normal gait [Normal Color/ Pigmentation] : normal color/ pigmentation [No Focal Deficits] : no focal deficits [Oriented x3] : oriented x3 [Normal Affect] : normal affect

## 2023-03-22 NOTE — HISTORY OF PRESENT ILLNESS
[Never] : never [TextBox_4] : 47-year-old male\par Pulmonary evaluation\par Abnormal chest CT scan\par Patient was having chest pains atypical chest pains described cramping in his chest\par Subsequently evaluated emergency department March 9, 2023\par He underwent a CT angiogram protocol\par There was no evidence of a aortic dissection\par He did not a 6 mm pulmonary nodule new compared to a prior study back to 2021\par He does complain of some shortness of breath\par He has no history of pulmonary disease reported\par Denies asthma pneumonia pulmonary embolism COPD emphysema obstructive sleep apnea\par No chest trauma reported\par Non-smoker\par No history of chemical toxic inhalation exposure\par No recent travel\par \par Past medical history\par Seizure disorder\par Hypertension\par Medications include magnesium 40 mg daily\par Dilantin 100 mg 1 tablet a.m. 2 tablets p.m.\par Tone will milligrams daily\par Coreg 25 mg twice daily\par Depakote 250 mg question dose if it is 2050 mg twice daily\par Noted that he has not abnormal EKG with flipped T waves\par Blood work current emergency department at that time demonstrated negative troponin\par BNP was normal\par Liver enzymes normal creatinine 1.34 mild ORIANA\par Otherwise serum electrolytes are normal\par CBC normal\par WBC 8.01 hemoglobin 12\par Platelets normal range\par

## 2023-03-23 ENCOUNTER — NON-APPOINTMENT (OUTPATIENT)
Age: 48
End: 2023-03-23

## 2023-03-23 ENCOUNTER — APPOINTMENT (OUTPATIENT)
Dept: PULMONOLOGY | Facility: CLINIC | Age: 48
End: 2023-03-23

## 2023-03-23 DIAGNOSIS — R70.0 ELEVATED ERYTHROCYTE SEDIMENTATION RATE: ICD-10-CM

## 2023-03-23 LAB
ANACR T: NEGATIVE
ANION GAP SERPL CALC-SCNC: 16 MMOL/L
BUN SERPL-MCNC: 13 MG/DL
CALCIUM SERPL-MCNC: 9.6 MG/DL
CHLORIDE SERPL-SCNC: 102 MMOL/L
CO2 SERPL-SCNC: 21 MMOL/L
CREAT SERPL-MCNC: 1.22 MG/DL
CRP SERPL-MCNC: <3 MG/L
EGFR: 73 ML/MIN/1.73M2
ERYTHROCYTE [SEDIMENTATION RATE] IN BLOOD BY WESTERGREN METHOD: 64 MM/HR
GLUCOSE SERPL-MCNC: 86 MG/DL
HCT VFR BLD CALC: 41.9 %
HGB BLD-MCNC: 12.8 G/DL
MCHC RBC-ENTMCNC: 22.7 PG
MCHC RBC-ENTMCNC: 30.5 GM/DL
MCV RBC AUTO: 74.3 FL
PLATELET # BLD AUTO: 307 K/UL
POTASSIUM SERPL-SCNC: 4.4 MMOL/L
RBC # BLD: 5.64 M/UL
RBC # FLD: 15.9 %
RHEUMATOID FACT SER QL: <10 IU/ML
SODIUM SERPL-SCNC: 139 MMOL/L
WBC # FLD AUTO: 6.79 K/UL

## 2023-03-31 ENCOUNTER — EMERGENCY (EMERGENCY)
Facility: HOSPITAL | Age: 48
LOS: 1 days | Discharge: HOME | End: 2023-03-31
Attending: EMERGENCY MEDICINE | Admitting: EMERGENCY MEDICINE
Payer: COMMERCIAL

## 2023-03-31 ENCOUNTER — NON-APPOINTMENT (OUTPATIENT)
Age: 48
End: 2023-03-31

## 2023-03-31 VITALS
SYSTOLIC BLOOD PRESSURE: 164 MMHG | RESPIRATION RATE: 18 BRPM | TEMPERATURE: 98 F | OXYGEN SATURATION: 100 % | DIASTOLIC BLOOD PRESSURE: 102 MMHG | HEART RATE: 61 BPM

## 2023-03-31 VITALS
DIASTOLIC BLOOD PRESSURE: 92 MMHG | TEMPERATURE: 98 F | HEART RATE: 65 BPM | OXYGEN SATURATION: 100 % | SYSTOLIC BLOOD PRESSURE: 112 MMHG | RESPIRATION RATE: 18 BRPM

## 2023-03-31 LAB
ALBUMIN SERPL ELPH-MCNC: 4.2 G/DL — SIGNIFICANT CHANGE UP (ref 3.3–5)
ALP SERPL-CCNC: 97 U/L — SIGNIFICANT CHANGE UP (ref 40–120)
ALT FLD-CCNC: 32 U/L — SIGNIFICANT CHANGE UP (ref 4–41)
AMPHET UR-MCNC: NEGATIVE — SIGNIFICANT CHANGE UP
ANION GAP SERPL CALC-SCNC: 11 MMOL/L — SIGNIFICANT CHANGE UP (ref 7–14)
APPEARANCE UR: CLEAR — SIGNIFICANT CHANGE UP
AST SERPL-CCNC: 35 U/L — SIGNIFICANT CHANGE UP (ref 4–40)
B PERT DNA SPEC QL NAA+PROBE: SIGNIFICANT CHANGE UP
B PERT+PARAPERT DNA PNL SPEC NAA+PROBE: SIGNIFICANT CHANGE UP
BARBITURATES UR SCN-MCNC: NEGATIVE — SIGNIFICANT CHANGE UP
BENZODIAZ UR-MCNC: NEGATIVE — SIGNIFICANT CHANGE UP
BILIRUB SERPL-MCNC: <0.2 MG/DL — SIGNIFICANT CHANGE UP (ref 0.2–1.2)
BILIRUB UR-MCNC: NEGATIVE — SIGNIFICANT CHANGE UP
BORDETELLA PARAPERTUSSIS (RAPRVP): SIGNIFICANT CHANGE UP
BUN SERPL-MCNC: 10 MG/DL — SIGNIFICANT CHANGE UP (ref 7–23)
C PNEUM DNA SPEC QL NAA+PROBE: SIGNIFICANT CHANGE UP
CALCIUM SERPL-MCNC: 9.1 MG/DL — SIGNIFICANT CHANGE UP (ref 8.4–10.5)
CHLORIDE SERPL-SCNC: 103 MMOL/L — SIGNIFICANT CHANGE UP (ref 98–107)
CK SERPL-CCNC: 190 U/L — SIGNIFICANT CHANGE UP (ref 30–200)
CO2 SERPL-SCNC: 27 MMOL/L — SIGNIFICANT CHANGE UP (ref 22–31)
COCAINE METAB.OTHER UR-MCNC: NEGATIVE — SIGNIFICANT CHANGE UP
COLOR SPEC: COLORLESS — SIGNIFICANT CHANGE UP
CREAT SERPL-MCNC: 1.2 MG/DL — SIGNIFICANT CHANGE UP (ref 0.5–1.3)
CREATININE URINE RESULT, DAU: 46 MG/DL — SIGNIFICANT CHANGE UP
DIFF PNL FLD: NEGATIVE — SIGNIFICANT CHANGE UP
EGFR: 75 ML/MIN/1.73M2 — SIGNIFICANT CHANGE UP
FLUAV SUBTYP SPEC NAA+PROBE: SIGNIFICANT CHANGE UP
FLUBV RNA SPEC QL NAA+PROBE: SIGNIFICANT CHANGE UP
GLUCOSE SERPL-MCNC: 91 MG/DL — SIGNIFICANT CHANGE UP (ref 70–99)
GLUCOSE UR QL: NEGATIVE — SIGNIFICANT CHANGE UP
HADV DNA SPEC QL NAA+PROBE: SIGNIFICANT CHANGE UP
HCOV 229E RNA SPEC QL NAA+PROBE: SIGNIFICANT CHANGE UP
HCOV HKU1 RNA SPEC QL NAA+PROBE: SIGNIFICANT CHANGE UP
HCOV NL63 RNA SPEC QL NAA+PROBE: SIGNIFICANT CHANGE UP
HCOV OC43 RNA SPEC QL NAA+PROBE: SIGNIFICANT CHANGE UP
HCT VFR BLD CALC: 44.5 % — SIGNIFICANT CHANGE UP (ref 39–50)
HGB BLD-MCNC: 13.2 G/DL — SIGNIFICANT CHANGE UP (ref 13–17)
HMPV RNA SPEC QL NAA+PROBE: SIGNIFICANT CHANGE UP
HPIV1 RNA SPEC QL NAA+PROBE: SIGNIFICANT CHANGE UP
HPIV2 RNA SPEC QL NAA+PROBE: SIGNIFICANT CHANGE UP
HPIV3 RNA SPEC QL NAA+PROBE: SIGNIFICANT CHANGE UP
HPIV4 RNA SPEC QL NAA+PROBE: SIGNIFICANT CHANGE UP
KETONES UR-MCNC: NEGATIVE — SIGNIFICANT CHANGE UP
LACTATE SERPL-SCNC: 2.1 MMOL/L — HIGH (ref 0.5–2)
LEUKOCYTE ESTERASE UR-ACNC: NEGATIVE — SIGNIFICANT CHANGE UP
M PNEUMO DNA SPEC QL NAA+PROBE: SIGNIFICANT CHANGE UP
MAGNESIUM SERPL-MCNC: 2.2 MG/DL — SIGNIFICANT CHANGE UP (ref 1.6–2.6)
MCHC RBC-ENTMCNC: 22.3 PG — LOW (ref 27–34)
MCHC RBC-ENTMCNC: 29.7 GM/DL — LOW (ref 32–36)
MCV RBC AUTO: 75 FL — LOW (ref 80–100)
METHADONE UR-MCNC: NEGATIVE — SIGNIFICANT CHANGE UP
NITRITE UR-MCNC: NEGATIVE — SIGNIFICANT CHANGE UP
NRBC # BLD: 0 /100 WBCS — SIGNIFICANT CHANGE UP (ref 0–0)
NRBC # FLD: 0 K/UL — SIGNIFICANT CHANGE UP (ref 0–0)
OPIATES UR-MCNC: NEGATIVE — SIGNIFICANT CHANGE UP
OXYCODONE UR-MCNC: NEGATIVE — SIGNIFICANT CHANGE UP
PCP SPEC-MCNC: SIGNIFICANT CHANGE UP
PCP UR-MCNC: NEGATIVE — SIGNIFICANT CHANGE UP
PH UR: 7 — SIGNIFICANT CHANGE UP (ref 5–8)
PLATELET # BLD AUTO: 283 K/UL — SIGNIFICANT CHANGE UP (ref 150–400)
POTASSIUM SERPL-MCNC: 4.5 MMOL/L — SIGNIFICANT CHANGE UP (ref 3.5–5.3)
POTASSIUM SERPL-SCNC: 4.5 MMOL/L — SIGNIFICANT CHANGE UP (ref 3.5–5.3)
PROLACTIN SERPL-MCNC: 13.6 NG/ML — SIGNIFICANT CHANGE UP (ref 4.1–18.4)
PROT SERPL-MCNC: 7.8 G/DL — SIGNIFICANT CHANGE UP (ref 6–8.3)
PROT UR-MCNC: NEGATIVE — SIGNIFICANT CHANGE UP
RAPID RVP RESULT: DETECTED
RBC # BLD: 5.93 M/UL — HIGH (ref 4.2–5.8)
RBC # FLD: 15.9 % — HIGH (ref 10.3–14.5)
RBC CASTS # UR COMP ASSIST: 0 /HPF — SIGNIFICANT CHANGE UP (ref 0–4)
RSV RNA SPEC QL NAA+PROBE: SIGNIFICANT CHANGE UP
RV+EV RNA SPEC QL NAA+PROBE: DETECTED
SARS-COV-2 RNA SPEC QL NAA+PROBE: SIGNIFICANT CHANGE UP
SODIUM SERPL-SCNC: 141 MMOL/L — SIGNIFICANT CHANGE UP (ref 135–145)
SP GR SPEC: 1.01 — SIGNIFICANT CHANGE UP (ref 1.01–1.05)
THC UR QL: NEGATIVE — SIGNIFICANT CHANGE UP
UROBILINOGEN FLD QL: SIGNIFICANT CHANGE UP
VALPROATE SERPL-MCNC: 33 UG/ML — LOW (ref 50–100)
WBC # BLD: 6.93 K/UL — SIGNIFICANT CHANGE UP (ref 3.8–10.5)
WBC # FLD AUTO: 6.93 K/UL — SIGNIFICANT CHANGE UP (ref 3.8–10.5)
WBC UR QL: 1 /HPF — SIGNIFICANT CHANGE UP (ref 0–5)

## 2023-03-31 PROCEDURE — 99284 EMERGENCY DEPT VISIT MOD MDM: CPT

## 2023-03-31 RX ORDER — VALPROIC ACID (AS SODIUM SALT) 250 MG/5ML
500 SOLUTION, ORAL ORAL ONCE
Refills: 0 | Status: COMPLETED | OUTPATIENT
Start: 2023-03-31 | End: 2023-03-31

## 2023-03-31 RX ORDER — KETOROLAC TROMETHAMINE 30 MG/ML
15 SYRINGE (ML) INJECTION ONCE
Refills: 0 | Status: DISCONTINUED | OUTPATIENT
Start: 2023-03-31 | End: 2023-03-31

## 2023-03-31 RX ADMIN — Medication 15 MILLIGRAM(S): at 11:05

## 2023-03-31 RX ADMIN — Medication 55 MILLIGRAM(S): at 11:37

## 2023-03-31 NOTE — ED PROVIDER NOTE - NSFOLLOWUPINSTRUCTIONS_ED_ALL_ED_FT
PLEASE no driving or operating heavy machinery for at least 6 months  PLEASE follow-up with Dr. Wade in 1-2 WEEKS and recheck Depakote level  PLEASE continue to take depakote 500mg twice a day       What are seizures?  Seizures are waves of abnormal electrical activity in the brain. Seizures can make you pass out, or move or behave strangely. Most seizures last only a few seconds or minutes.    Epilepsy is a condition that causes people to have repeated seizures. But not everyone who has had a seizure has epilepsy. Problems such as low blood sugar or infection can also cause seizures. Other problems such as anxiety or fainting spells can cause events that look like seizures.    What are the symptoms of a seizure?  There are different kinds of seizures. Each causes a different set of symptoms:    ?People who have "tonic clonic" or "grand mal" seizures often get stiff and then have jerking movements.    ?People who have other types of seizures have less dramatic changes. For instance, some people have shaking movements in just 1 arm or in a part of their face. Other people suddenly stop responding and stare for a few seconds.    Should I see a doctor or nurse if I have a seizure?  If you have never had a seizure before and you have one, you (or whoever is with you) should call for an ambulance (in the [place] and [place], call 9-1-1). Having a seizure can be a sign that something is wrong with your brain.    How are seizures treated?  The right treatment for seizures depends on what is causing them. If you have seizures because of an infection, you will probably need treatments to get rid of the infection. But if you have repeated seizures because of epilepsy, you will probably need anti-seizure medicines, also called "anti-convulsants."    People sometimes need to try different medicines before they find a treatment that works well. Seizures can be hard to control. But if you work with your doctor, chances are good that you will find a treatment that works.    Do anti-seizure medicines cause side effects?  Yes. Anti-seizure medicines can cause side effects. They can make you feel tired or clumsy, or cause other problems. If you are bothered by side effects, tell your doctor. They can work with you to find the medicine or dose that causes the fewest problems. Most of the side effects from these medicines are mild. But there are 2 side effects that are very serious but rare:    ?Anti-seizure medicines can cause a rare but serious skin rash. Tell your doctor or nurse right away if you notice a new rash while taking an anti-seizure medicine.    ?Anti-seizure medicines can increase the risk of becoming suicidal (wanting to kill yourself). Tell your doctor or nurse if you start to feel depressed.    Get help right away if you are thinking of hurting or killing yourself!  Sometimes, people with depression think of hurting or killing themselves. If you ever feel like you might hurt yourself, help is available:    ?In the [place], contact the [employer] Suicide & Crisis Lifeline:    •To speak to someone, call or text [employer]    •To talk to someone online, go to www.[employer].org/chat.    ?Call your doctor or nurse and tell them that it is an emergency.    ?Call for an ambulance (in the [place] and [place], call 9-1-1).    ?Go to the emergency department at your local hospital.    If you think your partner might have depression, or if you are worried that they might hurt themselves, get them help right away.

## 2023-03-31 NOTE — ED PROVIDER NOTE - CLINICAL SUMMARY MEDICAL DECISION MAKING FREE TEXT BOX
new onset tremors, possibly 2/2 medication change, also considering new seizure focus, mass less likely ct not warranted at this time 2/2 sudden onset of symptoms, stroke less likely w/ no deficits, only tremors on exam. nifectious/metabolic considered get labs r/o. dispo pending neurology recs. Mihailos att: new onset tremors, possibly 2/2 medication change, also considering new seizure focus, mass less likely ct not warranted at this time 2/2 sudden onset of symptoms, stroke less likely w/ no deficits, only tremors on exam. nifectious/metabolic considered get labs r/o. dispo pending neurology recs.

## 2023-03-31 NOTE — ED PROVIDER NOTE - NSICDXFAMILYHX_GEN_ALL_CORE_FT
Patient states tingling/numbness in her left leg has gotten 100x worse in the last 3 days to 1 week  She states the numbness is so bad she can barely stand  Did advise patient to be evaluated in ER with any sudden significant changes in symptoms  Patient refuses, states ER will not be able to do anything for her as this is an ongoing problem  Patient asking if EMG and NCV should be repeated  States it is believed that this is a problem with her superficial peroneal nerve in her left leg  She is asking if EMG will show problems with this nerve  Patient also asking if she should be referred to another neurologist for a second opinion, specifically asking about hospitals in [place] (did mention [place])  Patient scheduled with [address] on 9/20  FAMILY HISTORY:  Mother  Still living? Unknown  FH: HTN (hypertension), Age at diagnosis: Age Unknown

## 2023-03-31 NOTE — CONSULT NOTE ADULT - SUBJECTIVE AND OBJECTIVE BOX
Neurology - Consult Note    -  Spectra: [phone] ([facility]), [phone] ([facility])  -    HPI: Patient LESA PABLO is a 47y (1975) man with a PMHx significant for epilepsy, lung nodule, who presents w/ CC of R-sided shaking. He was first diagnosed w/ epilepsy at age 12. His typical seizures consist of tinnitus, b/l blurry vision, b/l UE rigidity that transforms into generalized shaking lasting for a few minutes. It is associated w/ incontinence but not tongue biting. He has not had an episode like this in the past 6 months. However, on 3/9/23, he had an episode of R-sided shaking that lasted for 1 second. No tongue biting, incontinence, or LOC. In the interim, he was exposed to his nephews, who have both been sick. He developed sneezing, coughing, and low grade fevers. Yesterday AM, he had another episode of the R-sided shaking. He reports being compliant w/ the Depakote. No missed doses or new medications. No substance use. No current numbness, tingling, slurred speech, dizziness, or headache. He does note that his gait is unsteady now, which is atypical for him.      Review of Systems:    CONSTITUTIONAL: +fevers  EYES AND ENT: No visual changes or no throat pain   NECK: No pain or stiffness  RESPIRATORY: No hemoptysis or shortness of breath; +cough, sneezing  CARDIOVASCULAR: No chest pain or palpitations  GASTROINTESTINAL: No melena or hematochezia  GENITOURINARY: No dysuria or hematuria  NEUROLOGICAL: +As stated in HPI above  SKIN: No itching, burning, rashes, or lesions   All other review of systems is negative unless indicated above.    Allergies:  No Known Allergies      PMHx/PSHx/Family Hx: As above, otherwise see below   Hypertension    Seizure Disorder        Social Hx:  No current use of tobacco, alcohol, or illicit drugs    Medications:  MEDICATIONS  (STANDING):    MEDICATIONS  (PRN):      Vitals:  T(C): 36.6 (03-31-23 @ 07:00), Max: 36.8 (03-31-23 @ 06:10)  HR: 61 (03-31-23 @ 07:00) (61 - 61)  BP: 163/105 (03-31-23 @ 07:00) (163/105 - 164/102)  RR: 19 (03-31-23 @ 07:00) (18 - 19)  SpO2: 100% (03-31-23 @ 07:00) (100% - 100%)    Physical Examination:   General - NAD  Cardiovascular - Peripheral pulses palpable, no edema  Eyes - Fundoscopy not performed due to safety precautions in the setting of the COVID-19 pandemic    Neurologic Exam:  Mental status - Awake, Alert, Oriented to person, place, and time. Speech fluent, repetition and naming intact. Follows simple and complex commands. Attention/concentration, recent and remote memory (including registration and recall), and fund of knowledge intact    Cranial nerves - PERRLA, VFF, EOMI, face sensation (V1-V3) intact b/l, facial strength intact without asymmetry b/l, hearing intact b/l, palate with symmetric elevation, trapezius 5/5 strength b/l, tongue midline on protrusion with full lateral movement    Motor - Normal bulk and tone throughout. No pronator drift.  Strength testing            Deltoid      Biceps      Triceps     Wrist Extension    Wrist Flexion     Interossei         R            5                 5               5                     5                              5                        5                 5  L             5                 5               5                     5                              5                        5                 5              Hip Flexion    Hip Extension    Knee Flexion    Knee Extension    Dorsiflexion    Plantar Flexion  R              5                           5                       5                           5                            5                          5  L              5                           5                        5                           5                            5                          5    Sensation - Light touch intact throughout    DTR's -             Biceps      Triceps     Brachioradialis      Patellar    Ankle    Toes/plantar response  R             2+             2+                  2+                       2+            2+                 Down  L              2+             2+                 2+                        2+           2+                 Down    Coordination - Finger to Nose intact b/l. No tremors appreciated    Gait and station - mildly unsteady gait, ambulates unassisted, Romberg negative    Labs:                        13.2   6.93  )-----------( 283      ( 31 Mar 2023 06:58 )             44.5     03-31    141  |  103  |  10  ----------------------------<  91  4.5   |  27  |  1.20    Ca    9.1      31 Mar 2023 06:58  Mg     2.20     03-31    TPro  7.8  /  Alb  4.2  /  TBili  <0.2  /  DBili  x   /  AST  35  /  ALT  32  /  AlkPhos  97  03-31    CAPILLARY BLOOD GLUCOSE        LIVER FUNCTIONS - ( 31 Mar 2023 06:58 )  Alb: 4.2 g/dL / Pro: 7.8 g/dL / ALK PHOS: 97 U/L / ALT: 32 U/L / AST: 35 U/L / GGT: x                           Radiology:  no imaging to review

## 2023-03-31 NOTE — ED ADULT NURSE NOTE - OBJECTIVE STATEMENT
A&Ox4. PMH: HTN and seizure. Patient had right sided shaking yesterday that lasted for 1 minute. Patient stated he took Nyquil Wednesday night and that is when he started to feel jittery. Patient also admits to being sick since 3/9, but does not remember if he was tested for COVID-19. Denies N/V, headache or SOB. Respirations even and unlabored. Normal sinus on monitor. 20 gauge IV started in left hand. Labs obtained and awaiting results. Safety precautions in place, bed in lowest position and oriented to call bell.

## 2023-03-31 NOTE — ED PROVIDER NOTE - PROGRESS NOTE DETAILS
Idalia Albert PGY-3 patient stable for discharge  given strict return precautions for any worsening seizure like acitvity to come back to er or any new concerning sxs  plan to follow up with PMD

## 2023-03-31 NOTE — ED ADULT NURSE NOTE - NS ED NURSE DISCH DISPOSITION
· IVF 75/hr consider d/c and starting diet if able  · IR consulted for possible perc nephrostomy tube placement  Discharged

## 2023-03-31 NOTE — ED ADULT TRIAGE NOTE - BP NONINVASIVE DIASTOLIC (MM HG)
MD Chinyere Chan, RN 18 hours ago (4:40 PM)      I think he needs a better fitting mask.     Okay to change CPAP of 14 cm pressure to APAP 10-16 cm     To schedule follow-up with me in 6 weeks with change of pressure         102

## 2023-03-31 NOTE — CONSULT NOTE ADULT - ASSESSMENT
47y M with Hx epilepsy, lung nodule, who presents w/ CC of R-sided shaking    Impression: 2x episodes of R-sided shaking, lasting 1 second each, in setting of respiratory symptoms, positive RVP (rhinovirus), and subtherapeutic VPA level (33). Concerned for possible provoked seizure due to acute respiratory illness.    Recommendations:  [] give 1x Depakote 500mg IV now  [] c/w home Depakote 500mg BID  [] no need for EEG or CTH  [] check lactate, prolactin, CK, UA, UTox  [] fall/seizure precautions  [] neuro checks q4h  [] no driving or operating heavy machinery for at least 6 months  [] when medically ready for DC, follow-up with Dr. Wade in 1-2w and recheck Depakote level    Case discussed w/ attending Dr. Wade. Will also be discussed and staffed at bedside w/ attending Dr. Gu. Recommendations preliminary until attested.   47y M with Hx epilepsy, lung nodule, who presents w/ CC of R-sided shaking    Impression: 2x episodes of R-sided shaking, lasting 1 second each, in setting of respiratory symptoms, positive RVP (rhinovirus), and subtherapeutic VPA level (33). Concerned for possible provoked seizure due to acute respiratory illness.    Recommendations:  [] give 1x Depakote 500mg IV now  [] please increase home Depakote to 500mg qAM and 750mg qPM  [] no need for EEG or CTH  [] check lactate, prolactin, CK, UA, UTox  [] fall/seizure precautions  [] neuro checks q4h  [] no driving or operating heavy machinery for at least 6 months  [] when medically ready for DC, follow-up with Dr. Wade in 1-2w and recheck Depakote level    Case discussed w/ attending Dr. Wade. Will also be discussed and staffed at bedside w/ attending Dr. Gu. Recommendations preliminary until attested.

## 2023-03-31 NOTE — CONSULT NOTE ADULT - ATTENDING COMMENTS
Mr. Welch is a 46 yo man with epilepsy with seizures in setting of acute medical illness.   I agree with work up and management as above.   Thank you.

## 2023-03-31 NOTE — ED PROVIDER NOTE - PHYSICAL EXAMINATION
CONSTITUTIONAL: NAD  SKIN: Warm dry, normal skin turgor  HEAD: NCAT, slight tremors  EYES: EOMI, PERRLA, no scleral icterus, conjunctiva pink, no nystagmus  ENT: normal pharynx with no erythema or exudates, tongue tremors  NECK: Supple; non tender. Full ROM.  CARD: RRR, no murmurs.  RESP: clear to ausculation b/l. No crackles or wheezing.  ABD: soft, non-tender, non-distended, no rebound or guarding.  MSK: Full ROM, no bony tenderness, no pedal edema, no calf tenderness, mild b/l ue tremor  NEURO: normal motor except tremors described above. normal sensory.   PSYCH: Cooperative, appropriate.

## 2023-03-31 NOTE — ED PROVIDER NOTE - PATIENT PORTAL LINK FT
You can access the FollowMyHealth Patient Portal offered by [facility] by registering at the following website: [email or web address]. By joining [employer]’s FollowMyHealth portal, you will also be able to view your health information using other applications (apps) compatible with our system.

## 2023-03-31 NOTE — ED PROVIDER NOTE - OBJECTIVE STATEMENT
47m pmh seizures on valproic acid, htn, hld recently started amlodipine prsents to the ED for head, tongue, and RUE and RLE shaking which started yesterday at 1100 and hasn't stopped. upon arrival to ED, extremity shaking reduced however pt still feels head and tongue shaking. no recent seizures, pt has been sick w/ uri recentyl, no fevers/chills. pt denies any compalints. compliant w/ ,meds, no recreational drugs or alcohol use.

## 2023-03-31 NOTE — ED ADULT TRIAGE NOTE - CHIEF COMPLAINT QUOTE
c/o an episode of right sided body shaking occurring yesterday morning for approx 1 min. pt states "I am coming to the ER to make sure I didn't have a seizures yesterday." also endorse eye dryness and upper back pain.  HX HTN, Seizures (divalproex)

## 2023-05-10 ENCOUNTER — APPOINTMENT (OUTPATIENT)
Dept: PULMONOLOGY | Facility: CLINIC | Age: 48
End: 2023-05-10

## 2023-06-30 ENCOUNTER — APPOINTMENT (OUTPATIENT)
Dept: PULMONOLOGY | Facility: CLINIC | Age: 48
End: 2023-06-30

## 2023-07-07 ENCOUNTER — APPOINTMENT (OUTPATIENT)
Dept: NEUROLOGY | Facility: CLINIC | Age: 48
End: 2023-07-07

## 2023-07-21 ENCOUNTER — APPOINTMENT (OUTPATIENT)
Dept: NEUROLOGY | Facility: CLINIC | Age: 48
End: 2023-07-21
Payer: COMMERCIAL

## 2023-07-21 VITALS
SYSTOLIC BLOOD PRESSURE: 144 MMHG | BODY MASS INDEX: 27.58 KG/M2 | HEIGHT: 68 IN | WEIGHT: 182 LBS | DIASTOLIC BLOOD PRESSURE: 88 MMHG | HEART RATE: 65 BPM

## 2023-07-21 PROCEDURE — 99214 OFFICE O/P EST MOD 30 MIN: CPT

## 2023-07-21 RX ORDER — DIVALPROEX SODIUM 250 MG/1
250 TABLET, EXTENDED RELEASE ORAL
Qty: 180 | Refills: 5 | Status: ACTIVE | COMMUNITY
Start: 2022-05-11 | End: 1900-01-01

## 2023-07-21 NOTE — HISTORY OF PRESENT ILLNESS
[FreeTextEntry1] : 47 yo RH man with long-standing history of epilepsy. Generalized spikes seen on interictal EEG (primary generalized epilepsy vs focal with secondary bilateral synchrony), stable on Depakote ER monotherapy.\par Chronic neck pain, likely due to cervical spondylosis.\par \par Since last visit, he went to the ER on 3/31/23 for symptoms of "shaking in right arm" of very short duration (seconds).  It was unclear if this was seizure related.  He had been reporting pain in the right arm around this time as well, which has since resolved.\par \par He continues to report pain and redness in the eyes, but did not see ophthalmology last visit.\par \par He works in maintanence, he is driving.\par \par Current AEDs:\par Depakote ER 1000mg in AM and 500mg in PM\par \par Previous AEDs:\par PHT

## 2023-07-21 NOTE — ASSESSMENT
[FreeTextEntry1] : 49 yo RH man with long-standing history of epilepsy. Generalized spikes seen on interictal EEG (primary generalized epilepsy vs focal with secondary bilateral synchrony), stable on Depakote ER monotherapy.\par Chronic eye pain.\par \par Plan:\par 1. Continue Depakote ER 750mg PO BID\par 2. Labs: VPA level, CBC, CMP\par 3. Seizure precautions discussed\par 4. Ophtho consult re: eye pain\par 5. Patient agrees with plan.\par 6. Follow up in 6 months.\par \par Randal Wade MD\par [facility]\par \par Greater than 50% of the encounter was spent on counseling and coordination of care discussing differential diagnosis, diagnostic testing, and treatment options. We have talked about appropriate follow up, and I have spent 25 minutes of face to face time with the patient. \par \par More than 50% of time spent counseling and educating patient about epilepsy specific safety issues including ASM side effects and interactions, alcohol consumption, sleep deprivation, risks and driving privileges associated with the [place] Guidelines, what is SUDEP and death related to seizures/SUDEP, seizure 1st aid and risks.

## 2023-10-05 ENCOUNTER — EMERGENCY (EMERGENCY)
Facility: HOSPITAL | Age: 48
LOS: 1 days | Discharge: HOME | End: 2023-10-05
Attending: STUDENT IN AN ORGANIZED HEALTH CARE EDUCATION/TRAINING PROGRAM | Admitting: STUDENT IN AN ORGANIZED HEALTH CARE EDUCATION/TRAINING PROGRAM
Payer: COMMERCIAL

## 2023-10-05 VITALS
RESPIRATION RATE: 18 BRPM | DIASTOLIC BLOOD PRESSURE: 122 MMHG | OXYGEN SATURATION: 100 % | TEMPERATURE: 98 F | HEART RATE: 54 BPM | SYSTOLIC BLOOD PRESSURE: 196 MMHG

## 2023-10-05 VITALS
SYSTOLIC BLOOD PRESSURE: 187 MMHG | DIASTOLIC BLOOD PRESSURE: 109 MMHG | HEART RATE: 60 BPM | TEMPERATURE: 98 F | RESPIRATION RATE: 16 BRPM | OXYGEN SATURATION: 99 %

## 2023-10-05 LAB
ALBUMIN SERPL ELPH-MCNC: 4 G/DL — SIGNIFICANT CHANGE UP (ref 3.3–5)
ALP SERPL-CCNC: 80 U/L — SIGNIFICANT CHANGE UP (ref 40–120)
ALT FLD-CCNC: 18 U/L — SIGNIFICANT CHANGE UP (ref 4–41)
ANION GAP SERPL CALC-SCNC: 9 MMOL/L — SIGNIFICANT CHANGE UP (ref 7–14)
AST SERPL-CCNC: 28 U/L — SIGNIFICANT CHANGE UP (ref 4–40)
BASOPHILS # BLD AUTO: 0.03 K/UL — SIGNIFICANT CHANGE UP (ref 0–0.2)
BASOPHILS NFR BLD AUTO: 0.4 % — SIGNIFICANT CHANGE UP (ref 0–2)
BILIRUB SERPL-MCNC: 0.4 MG/DL — SIGNIFICANT CHANGE UP (ref 0.2–1.2)
BUN SERPL-MCNC: 9 MG/DL — SIGNIFICANT CHANGE UP (ref 7–23)
CALCIUM SERPL-MCNC: 9.1 MG/DL — SIGNIFICANT CHANGE UP (ref 8.4–10.5)
CHLORIDE SERPL-SCNC: 107 MMOL/L — SIGNIFICANT CHANGE UP (ref 98–107)
CK MB BLD-MCNC: 0.8 % — SIGNIFICANT CHANGE UP (ref 0–2.5)
CK MB CFR SERPL CALC: 2 NG/ML — SIGNIFICANT CHANGE UP
CK SERPL-CCNC: 241 U/L — HIGH (ref 30–200)
CO2 SERPL-SCNC: 25 MMOL/L — SIGNIFICANT CHANGE UP (ref 22–31)
CREAT SERPL-MCNC: 1.2 MG/DL — SIGNIFICANT CHANGE UP (ref 0.5–1.3)
EGFR: 74 ML/MIN/1.73M2 — SIGNIFICANT CHANGE UP
EOSINOPHIL # BLD AUTO: 0.16 K/UL — SIGNIFICANT CHANGE UP (ref 0–0.5)
EOSINOPHIL NFR BLD AUTO: 2.3 % — SIGNIFICANT CHANGE UP (ref 0–6)
GLUCOSE SERPL-MCNC: 80 MG/DL — SIGNIFICANT CHANGE UP (ref 70–99)
HCT VFR BLD CALC: 41.6 % — SIGNIFICANT CHANGE UP (ref 39–50)
HGB BLD-MCNC: 12.8 G/DL — LOW (ref 13–17)
IANC: 2.92 K/UL — SIGNIFICANT CHANGE UP (ref 1.8–7.4)
IMM GRANULOCYTES NFR BLD AUTO: 0.1 % — SIGNIFICANT CHANGE UP (ref 0–0.9)
LYMPHOCYTES # BLD AUTO: 3.23 K/UL — SIGNIFICANT CHANGE UP (ref 1–3.3)
LYMPHOCYTES # BLD AUTO: 46.5 % — HIGH (ref 13–44)
MCHC RBC-ENTMCNC: 22.4 PG — LOW (ref 27–34)
MCHC RBC-ENTMCNC: 30.8 GM/DL — LOW (ref 32–36)
MCV RBC AUTO: 72.7 FL — LOW (ref 80–100)
MONOCYTES # BLD AUTO: 0.59 K/UL — SIGNIFICANT CHANGE UP (ref 0–0.9)
MONOCYTES NFR BLD AUTO: 8.5 % — SIGNIFICANT CHANGE UP (ref 2–14)
NEUTROPHILS # BLD AUTO: 2.92 K/UL — SIGNIFICANT CHANGE UP (ref 1.8–7.4)
NEUTROPHILS NFR BLD AUTO: 42.2 % — LOW (ref 43–77)
NRBC # BLD: 0 /100 WBCS — SIGNIFICANT CHANGE UP (ref 0–0)
NRBC # FLD: 0 K/UL — SIGNIFICANT CHANGE UP (ref 0–0)
PLATELET # BLD AUTO: 244 K/UL — SIGNIFICANT CHANGE UP (ref 150–400)
POTASSIUM SERPL-MCNC: 4.9 MMOL/L — SIGNIFICANT CHANGE UP (ref 3.5–5.3)
POTASSIUM SERPL-SCNC: 4.9 MMOL/L — SIGNIFICANT CHANGE UP (ref 3.5–5.3)
PROT SERPL-MCNC: 7.4 G/DL — SIGNIFICANT CHANGE UP (ref 6–8.3)
RBC # BLD: 5.72 M/UL — SIGNIFICANT CHANGE UP (ref 4.2–5.8)
RBC # FLD: 15.2 % — HIGH (ref 10.3–14.5)
SODIUM SERPL-SCNC: 141 MMOL/L — SIGNIFICANT CHANGE UP (ref 135–145)
TROPONIN T, HIGH SENSITIVITY RESULT: 8 NG/L — SIGNIFICANT CHANGE UP
TROPONIN T, HIGH SENSITIVITY RESULT: 9 NG/L — SIGNIFICANT CHANGE UP
WBC # BLD: 6.94 K/UL — SIGNIFICANT CHANGE UP (ref 3.8–10.5)
WBC # FLD AUTO: 6.94 K/UL — SIGNIFICANT CHANGE UP (ref 3.8–10.5)

## 2023-10-05 PROCEDURE — 93010 ELECTROCARDIOGRAM REPORT: CPT

## 2023-10-05 PROCEDURE — 71045 X-RAY EXAM CHEST 1 VIEW: CPT | Mod: 26

## 2023-10-05 PROCEDURE — 99285 EMERGENCY DEPT VISIT HI MDM: CPT

## 2023-10-05 RX ORDER — IBUPROFEN 200 MG
600 TABLET ORAL ONCE
Refills: 0 | Status: COMPLETED | OUTPATIENT
Start: 2023-10-05 | End: 2023-10-05

## 2023-10-05 RX ADMIN — Medication 600 MILLIGRAM(S): at 16:33

## 2023-10-05 NOTE — ED PROVIDER NOTE - PROGRESS NOTE DETAILS
Shabnam PGY3: patient reassessed. reports mild symptomatic improvement. Reports chest pain is non-exertional. No prior hx of CAD. Will be discharged with outpatient cardiology follow up.

## 2023-10-05 NOTE — ED PROVIDER NOTE - PATIENT PORTAL LINK FT
You can access the FollowMyHealth Patient Portal offered by Health system by registering at the following website: [email or web address]. By joining [employer]’s FollowMyHealth portal, you will also be able to view your health information using other applications (apps) compatible with our system.

## 2023-10-05 NOTE — ED PROVIDER NOTE - ATTENDING CONTRIBUTION TO CARE
48M h/o HTN, seizure d/o, dx with Covid one week ago p/w ongoing chest pain. States symptoms began one week and feel slightly improved. Reports sore throat and non-exertional chest pain. Pain non-positional, non-radiating and improves with motrin. Denies ongoing cough, sob, difficulty breathing, pain with inspiration, dizziness, headache, abd pain, n/v/d or other complaints.  Gen: non-toxic appearing, resting comfortably, nad  CV: chencho but regular  Pulm: clear lungs  Abd: soft, ntnd  Ext: no edema/swelling, no calf pain or tenderness  Skin: no rash/petechiae  MDM: 48M h/o HTN, seizure d/o, dx with Covid one week ago p/w ongoing chest pain - low concern for ACS, HEART score 1 assuming normal trop. Initial EKG sinus chencho with no concerning findings for ischemia. Given recent viral infection with chest pain, may be 2/2 pericarditis/myocarditis however pt does report that his current symptoms are improving. Will send trop, ck, ckmb. Check CXR and basic labs. Motrin provided for analgesia. If w/u unremarkable can likely dc with outpatient follow-up

## 2023-10-05 NOTE — ED ADULT NURSE NOTE - NSFALLRISK_ED_ALL_ED
89 yoF, PMHX of HTN, ?CVA, on anti coagulation, hypothyroidism presenting by EMS from [facility] for unwitnessed fall at 14:30. Legally blind. Remembers fall and describes losing her balance and having mechanical fall. No dizziness. Denies any fever, cough, NVD, SOB, recent illness. C/o LUE and R hip pain.   Limited hx due to memory issues. 89 yoF, PMHX of HTN, ?CVA,  hypothyroidism presenting by EMS from [facility] for unwitnessed fall at 14:30. Legally blind. Remembers fall and describes losing her balance and having mechanical fall. No dizziness. Denies any fever, cough, NVD, SOB, recent illness. C/o LUE and R hip pain. No

## 2023-10-05 NOTE — ED PROVIDER NOTE - NSFOLLOWUPINSTRUCTIONS_ED_ALL_ED_FT
You came to the Emergency Room because of chest pain.     Your You came to the Emergency Room because of chest pain.     Your lab work, Chest X-ray and EKG results were unremarkable.     You may take Tylenol 1000 mg every 8 hours (no more than 4000 mg per 24 hours) as needed for pain.   You may take Ibuprofen 400 mg every 6 hours as needed for pain.     Please follow up with the Cardiology Clinic within the next 7 days to monitor your symptoms.     A representative from [facility] will call you to schedule for an appointment within the next few days.     Please come back to the Emergency Room if your symptoms get worse.

## 2023-10-05 NOTE — ED ADULT NURSE NOTE - OBJECTIVE STATEMENT
Patient received in room 4. Patient A&Ox3 and ambulatory at baseline. Patient presents to the ED c/o "chest pressure". phx HTN, "seizures". Patient states he has been having cold-like symptoms for approximately one week; patient tested for covid at home and found to be positive. Patient states he has been experiencing intermittent left sided chest discomfort; patient denies shortness of breath, dyspnea and palpitations associated with chest pressure. Patient states "I want to return to work and want to be checked out before I go back". Airway patent, chest rise equal bilaterally, respirations unlabored. Patient able to speak in complete, uninterrupted sentences. Patient currently denies dyspnea, shortness of breath, and chest pain. Abdomen flat, soft and non-distended; patient denies nausea/vomiting and changes in BMs/urine. Pulse/motor/sensory present and no edema noted to all four extremities. 20G IV placed to right AC, labs collected and sent, medications administered as prescribed. Patient placed in position of comfort, safety measures in place, call bell within reach and isolation precautions maintained.

## 2023-10-05 NOTE — ED ADULT NURSE NOTE - NSFALLUNIVINTERV_ED_ALL_ED
Bed/Stretcher in lowest position, wheels locked, appropriate side rails in place/Call bell, personal items and telephone in reach/Instruct patient to call for assistance before getting out of bed/chair/stretcher/Non-slip footwear applied when patient is off stretcher/[place] to call system/Physically safe environment - no spills, clutter or unnecessary equipment/Purposeful proactive rounding/Room/bathroom lighting operational, light cord in reach

## 2023-10-05 NOTE — ED ADULT TRIAGE NOTE - CHIEF COMPLAINT QUOTE
Pt c/o sore throat x 1 week since testing positive for covid. Now endorsing intermittent chest pain x 2days. Endorsing fever. Denies SOB, headache. Hx: HTN, pt hypertensive in triage. reports took medication this morning.

## 2023-10-05 NOTE — ED ADULT TRIAGE NOTE - HEART RATE (BEATS/MIN)
60 Cosentyx Counseling:  I discussed with the patient the risks of Cosentyx including but not limited to worsening of Crohn's disease, immunosuppression, allergic reactions and infections.  The patient understands that monitoring is required including a PPD at baseline and must alert us or the primary physician if symptoms of infection or other concerning signs are noted.

## 2023-12-27 ENCOUNTER — EMERGENCY (EMERGENCY)
Facility: HOSPITAL | Age: 48
LOS: 1 days | Discharge: HOME | End: 2023-12-27
Admitting: STUDENT IN AN ORGANIZED HEALTH CARE EDUCATION/TRAINING PROGRAM
Payer: COMMERCIAL

## 2023-12-27 VITALS
SYSTOLIC BLOOD PRESSURE: 160 MMHG | HEART RATE: 75 BPM | TEMPERATURE: 98 F | DIASTOLIC BLOOD PRESSURE: 88 MMHG | OXYGEN SATURATION: 98 % | RESPIRATION RATE: 16 BRPM

## 2023-12-27 LAB
ALBUMIN SERPL ELPH-MCNC: 4.2 G/DL — SIGNIFICANT CHANGE UP (ref 3.3–5)
ALBUMIN SERPL ELPH-MCNC: 4.2 G/DL — SIGNIFICANT CHANGE UP (ref 3.3–5)
ALP SERPL-CCNC: 101 U/L — SIGNIFICANT CHANGE UP (ref 40–120)
ALP SERPL-CCNC: 101 U/L — SIGNIFICANT CHANGE UP (ref 40–120)
ALT FLD-CCNC: 31 U/L — SIGNIFICANT CHANGE UP (ref 4–41)
ALT FLD-CCNC: 31 U/L — SIGNIFICANT CHANGE UP (ref 4–41)
ANION GAP SERPL CALC-SCNC: 13 MMOL/L — SIGNIFICANT CHANGE UP (ref 7–14)
ANION GAP SERPL CALC-SCNC: 13 MMOL/L — SIGNIFICANT CHANGE UP (ref 7–14)
APTT BLD: 31.7 SEC — SIGNIFICANT CHANGE UP (ref 24.5–35.6)
APTT BLD: 31.7 SEC — SIGNIFICANT CHANGE UP (ref 24.5–35.6)
AST SERPL-CCNC: 45 U/L — HIGH (ref 4–40)
AST SERPL-CCNC: 45 U/L — HIGH (ref 4–40)
BASOPHILS # BLD AUTO: 0.04 K/UL — SIGNIFICANT CHANGE UP (ref 0–0.2)
BASOPHILS # BLD AUTO: 0.04 K/UL — SIGNIFICANT CHANGE UP (ref 0–0.2)
BASOPHILS NFR BLD AUTO: 0.5 % — SIGNIFICANT CHANGE UP (ref 0–2)
BASOPHILS NFR BLD AUTO: 0.5 % — SIGNIFICANT CHANGE UP (ref 0–2)
BILIRUB SERPL-MCNC: 0.3 MG/DL — SIGNIFICANT CHANGE UP (ref 0.2–1.2)
BILIRUB SERPL-MCNC: 0.3 MG/DL — SIGNIFICANT CHANGE UP (ref 0.2–1.2)
BUN SERPL-MCNC: 10 MG/DL — SIGNIFICANT CHANGE UP (ref 7–23)
BUN SERPL-MCNC: 10 MG/DL — SIGNIFICANT CHANGE UP (ref 7–23)
CALCIUM SERPL-MCNC: 8.7 MG/DL — SIGNIFICANT CHANGE UP (ref 8.4–10.5)
CALCIUM SERPL-MCNC: 8.7 MG/DL — SIGNIFICANT CHANGE UP (ref 8.4–10.5)
CHLORIDE SERPL-SCNC: 101 MMOL/L — SIGNIFICANT CHANGE UP (ref 98–107)
CHLORIDE SERPL-SCNC: 101 MMOL/L — SIGNIFICANT CHANGE UP (ref 98–107)
CO2 SERPL-SCNC: 24 MMOL/L — SIGNIFICANT CHANGE UP (ref 22–31)
CO2 SERPL-SCNC: 24 MMOL/L — SIGNIFICANT CHANGE UP (ref 22–31)
CREAT SERPL-MCNC: 0.96 MG/DL — SIGNIFICANT CHANGE UP (ref 0.5–1.3)
CREAT SERPL-MCNC: 0.96 MG/DL — SIGNIFICANT CHANGE UP (ref 0.5–1.3)
EGFR: 97 ML/MIN/1.73M2 — SIGNIFICANT CHANGE UP
EGFR: 97 ML/MIN/1.73M2 — SIGNIFICANT CHANGE UP
EOSINOPHIL # BLD AUTO: 0.11 K/UL — SIGNIFICANT CHANGE UP (ref 0–0.5)
EOSINOPHIL # BLD AUTO: 0.11 K/UL — SIGNIFICANT CHANGE UP (ref 0–0.5)
EOSINOPHIL NFR BLD AUTO: 1.5 % — SIGNIFICANT CHANGE UP (ref 0–6)
EOSINOPHIL NFR BLD AUTO: 1.5 % — SIGNIFICANT CHANGE UP (ref 0–6)
GLUCOSE SERPL-MCNC: 84 MG/DL — SIGNIFICANT CHANGE UP (ref 70–99)
GLUCOSE SERPL-MCNC: 84 MG/DL — SIGNIFICANT CHANGE UP (ref 70–99)
HCT VFR BLD CALC: 41.8 % — SIGNIFICANT CHANGE UP (ref 39–50)
HCT VFR BLD CALC: 41.8 % — SIGNIFICANT CHANGE UP (ref 39–50)
HGB BLD-MCNC: 13.1 G/DL — SIGNIFICANT CHANGE UP (ref 13–17)
HGB BLD-MCNC: 13.1 G/DL — SIGNIFICANT CHANGE UP (ref 13–17)
IANC: 4.17 K/UL — SIGNIFICANT CHANGE UP (ref 1.8–7.4)
IANC: 4.17 K/UL — SIGNIFICANT CHANGE UP (ref 1.8–7.4)
IMM GRANULOCYTES NFR BLD AUTO: 0.1 % — SIGNIFICANT CHANGE UP (ref 0–0.9)
IMM GRANULOCYTES NFR BLD AUTO: 0.1 % — SIGNIFICANT CHANGE UP (ref 0–0.9)
INR BLD: 0.97 RATIO — SIGNIFICANT CHANGE UP (ref 0.85–1.18)
INR BLD: 0.97 RATIO — SIGNIFICANT CHANGE UP (ref 0.85–1.18)
LYMPHOCYTES # BLD AUTO: 2.53 K/UL — SIGNIFICANT CHANGE UP (ref 1–3.3)
LYMPHOCYTES # BLD AUTO: 2.53 K/UL — SIGNIFICANT CHANGE UP (ref 1–3.3)
LYMPHOCYTES # BLD AUTO: 34.3 % — SIGNIFICANT CHANGE UP (ref 13–44)
LYMPHOCYTES # BLD AUTO: 34.3 % — SIGNIFICANT CHANGE UP (ref 13–44)
MCHC RBC-ENTMCNC: 22.8 PG — LOW (ref 27–34)
MCHC RBC-ENTMCNC: 22.8 PG — LOW (ref 27–34)
MCHC RBC-ENTMCNC: 31.3 GM/DL — LOW (ref 32–36)
MCHC RBC-ENTMCNC: 31.3 GM/DL — LOW (ref 32–36)
MCV RBC AUTO: 72.7 FL — LOW (ref 80–100)
MCV RBC AUTO: 72.7 FL — LOW (ref 80–100)
MONOCYTES # BLD AUTO: 0.51 K/UL — SIGNIFICANT CHANGE UP (ref 0–0.9)
MONOCYTES # BLD AUTO: 0.51 K/UL — SIGNIFICANT CHANGE UP (ref 0–0.9)
MONOCYTES NFR BLD AUTO: 6.9 % — SIGNIFICANT CHANGE UP (ref 2–14)
MONOCYTES NFR BLD AUTO: 6.9 % — SIGNIFICANT CHANGE UP (ref 2–14)
NEUTROPHILS # BLD AUTO: 4.17 K/UL — SIGNIFICANT CHANGE UP (ref 1.8–7.4)
NEUTROPHILS # BLD AUTO: 4.17 K/UL — SIGNIFICANT CHANGE UP (ref 1.8–7.4)
NEUTROPHILS NFR BLD AUTO: 56.7 % — SIGNIFICANT CHANGE UP (ref 43–77)
NEUTROPHILS NFR BLD AUTO: 56.7 % — SIGNIFICANT CHANGE UP (ref 43–77)
NRBC # BLD: 0 /100 WBCS — SIGNIFICANT CHANGE UP (ref 0–0)
NRBC # BLD: 0 /100 WBCS — SIGNIFICANT CHANGE UP (ref 0–0)
NRBC # FLD: 0 K/UL — SIGNIFICANT CHANGE UP (ref 0–0)
NRBC # FLD: 0 K/UL — SIGNIFICANT CHANGE UP (ref 0–0)
NT-PROBNP SERPL-SCNC: <36 PG/ML — SIGNIFICANT CHANGE UP
NT-PROBNP SERPL-SCNC: <36 PG/ML — SIGNIFICANT CHANGE UP
PLATELET # BLD AUTO: 244 K/UL — SIGNIFICANT CHANGE UP (ref 150–400)
PLATELET # BLD AUTO: 244 K/UL — SIGNIFICANT CHANGE UP (ref 150–400)
POTASSIUM SERPL-MCNC: 4.8 MMOL/L — SIGNIFICANT CHANGE UP (ref 3.5–5.3)
POTASSIUM SERPL-MCNC: 4.8 MMOL/L — SIGNIFICANT CHANGE UP (ref 3.5–5.3)
POTASSIUM SERPL-SCNC: 4.8 MMOL/L — SIGNIFICANT CHANGE UP (ref 3.5–5.3)
POTASSIUM SERPL-SCNC: 4.8 MMOL/L — SIGNIFICANT CHANGE UP (ref 3.5–5.3)
PROT SERPL-MCNC: 8.5 G/DL — HIGH (ref 6–8.3)
PROT SERPL-MCNC: 8.5 G/DL — HIGH (ref 6–8.3)
PROTHROM AB SERPL-ACNC: 11 SEC — SIGNIFICANT CHANGE UP (ref 9.5–13)
PROTHROM AB SERPL-ACNC: 11 SEC — SIGNIFICANT CHANGE UP (ref 9.5–13)
RBC # BLD: 5.75 M/UL — SIGNIFICANT CHANGE UP (ref 4.2–5.8)
RBC # BLD: 5.75 M/UL — SIGNIFICANT CHANGE UP (ref 4.2–5.8)
RBC # FLD: 15.7 % — HIGH (ref 10.3–14.5)
RBC # FLD: 15.7 % — HIGH (ref 10.3–14.5)
SODIUM SERPL-SCNC: 138 MMOL/L — SIGNIFICANT CHANGE UP (ref 135–145)
SODIUM SERPL-SCNC: 138 MMOL/L — SIGNIFICANT CHANGE UP (ref 135–145)
TROPONIN T, HIGH SENSITIVITY RESULT: 7 NG/L — SIGNIFICANT CHANGE UP
TROPONIN T, HIGH SENSITIVITY RESULT: 7 NG/L — SIGNIFICANT CHANGE UP
WBC # BLD: 7.37 K/UL — SIGNIFICANT CHANGE UP (ref 3.8–10.5)
WBC # BLD: 7.37 K/UL — SIGNIFICANT CHANGE UP (ref 3.8–10.5)
WBC # FLD AUTO: 7.37 K/UL — SIGNIFICANT CHANGE UP (ref 3.8–10.5)
WBC # FLD AUTO: 7.37 K/UL — SIGNIFICANT CHANGE UP (ref 3.8–10.5)

## 2023-12-27 PROCEDURE — 93010 ELECTROCARDIOGRAM REPORT: CPT

## 2023-12-27 PROCEDURE — 93971 EXTREMITY STUDY: CPT | Mod: 26,LT

## 2023-12-27 PROCEDURE — 71046 X-RAY EXAM CHEST 2 VIEWS: CPT | Mod: 26

## 2023-12-27 PROCEDURE — 99284 EMERGENCY DEPT VISIT MOD MDM: CPT

## 2023-12-27 NOTE — ED PROVIDER NOTE - CLINICAL SUMMARY MEDICAL DECISION MAKING FREE TEXT BOX
47 y/o male pmh htn, seizures c/o lower ext swelling, RLE pain, chest pain and sob. RLE pain and swelling has been worse over the last 1 month. Pt admits to exertional chest pain and sob, espcially at night and while working. Denies orthopnea. Pt has not seen a cardiologist in several years. Pt is well appearing, nad, afebrile, lungs cta b/l, no murmurs, b/l 2+ pitting edema, R calf tenderness, no L calf tenderness, 2+ pulses distally- concern for CHF vs ACS vs dependant edema vs renal failure- will check labs, ekg, cxr, RLE duplex to r/o DVT, likely CDU vs admission for cardiac work up.

## 2023-12-27 NOTE — ED PROVIDER NOTE - PROGRESS NOTE DETAILS
ALFREDO Bates- Pt offered CDU for tele, echo and stress but state that he does not want to stay. Pt has a PMD who he can f/u with this week and would prefer to be discharged. I explained that it is very important to f/u and be seen by a cardiologist ASAP. Pt understands and will call his doctor. I will provide cardiology f/u list as well. stable for dc.

## 2023-12-27 NOTE — ED PROVIDER NOTE - OBJECTIVE STATEMENT
47 y/o male pmh htn, seizures c/o RLE pain and swelling. Pt admits to RLE pain and swelling for about 1 month. Pt admits to noticing his R ankle was painful and then the whole leg was as well. Pt then states that both legs became swollen. THe left one improved but the R one did not. Pt also admits to exertional chest pain and sob over the last 1-2 months, worse at night and while working. Pt denies diaphoresis, abd pain, n/v/d, numbness, tingling, weakness, dizziness, syncope, fever or chills. Denies recent travel or surgeries. Of note, pt has not seen a cardiologist in several years.

## 2023-12-27 NOTE — ED ADULT TRIAGE NOTE - CHIEF COMPLAINT QUOTE
Patient c/o atraumatic R ankle pain and swelling x 1 week. Pitting edema noted, no discoloration. Denies fever. Phx HTN, seizures

## 2023-12-27 NOTE — ED PROVIDER NOTE - NSFOLLOWUPINSTRUCTIONS_ED_ALL_ED_FT
Leg Edema    WHAT YOU NEED TO KNOW:    Leg edema is swelling caused by fluid buildup. Your legs may swell if you sit or stand for long periods of time, are pregnant, or are injured. Swelling may also occur if you have heart failure or circulation problems. This means that your heart does not pump blood through your body as it should.  Lower Leg Edema    DISCHARGE INSTRUCTIONS:    Call your local emergency number (911 in the [place]) for any of the following:    You cannot walk.    You have chest pain or trouble breathing that is worse when you lie down.    You suddenly feel lightheaded and have trouble breathing.    You have new and sudden chest pain. You may have more pain when you take deep breaths or cough.    You cough up blood.  Return to the emergency department if:    You feel faint or confused.    Your skin turns blue or gray.    Your leg feels warm, tender, and painful. It may be swollen and red.  Call your doctor if:    You have a fever or feel more tired than usual.    The veins in your legs look larger than usual. They may look full or bulging.    Your legs itch or feel heavy.    You have red or white areas or sores on your legs. The skin may also appear dimpled or have indentations.    You are gaining weight.    You have trouble moving your ankles.    The swelling does not go away, or other parts of your body swell.    You have questions or concerns about your condition or care.  Self-care:    Elevate your legs. Raise your legs above the level of your heart as often as you can. This will help decrease swelling and pain. Prop your legs on pillows or blankets to keep them elevated comfortably.  Elevate Leg      Wear pressure stockings, if directed. These tight stockings put pressure on your legs to promote blood flow and prevent blood clots. Put them on before you get out of bed. Wear the stockings during the day. Do not wear them while you sleep.  Pressure Stockings       Stay active. Do not stand or sit for long periods of time. Ask your healthcare provider about the best exercise plan for you.  Walking for Exercise      Eat healthy foods. Healthy foods include fruits, vegetables, whole-grain breads, low-fat dairy products, beans, lean meats, and fish. Ask if you need to be on a special diet.  Healthy Foods      Limit sodium (salt). Salt will make your body hold even more fluid. Your healthcare provider will tell you how many milligrams (mg) of salt you can have each day.    Follow up with your doctor as directed: Write down your questions so you remember to ask them during your visits.

## 2023-12-27 NOTE — ED ADULT TRIAGE NOTE - GLASGOW COMA SCALE: EYE OPENING, MLM
PAST MEDICAL HISTORY:  Acute cystitis without hematuria septic  4/2016    Essential hypertension     Mentasta (hard of hearing), bilateral     Hypertension     Lymphedema of both lower extremities     Monoclonal gammopathies     Paroxysmal atrial fibrillation     Spinal stenosis     Spondyloarthritis     Vaginal prolapse     Venous insufficiency      (E4) spontaneous

## 2023-12-27 NOTE — ED ADULT NURSE NOTE - OBJECTIVE STATEMENT
Received pt to [facility] with c/o R ankle pain and swelling x 1 week. Pt denies fall injury or trauma. Pt endorses hx of HTN and seizures. Pt reports noting pain goes from R ankle up leg. Pt denies chest pain, shortness of breath. #20g IV placed to R hand, lab work collected as ordered.

## 2023-12-27 NOTE — ED PROVIDER NOTE - PATIENT PORTAL LINK FT
You can access the FollowMyHealth Patient Portal offered by [facility] by registering at the following website: [email or web address]. By joining [employer]’s FollowMyHealth portal, you will also be able to view your health information using other applications (apps) compatible with our system. You can access the FollowMyHealth Patient Portal offered by [facility] by registering at the following website: [email or web address]. By joining [employer]’s FollowMyHealth portal, you will also be able to view your health information using other applications (apps) compatible with our system.

## 2023-12-27 NOTE — ED ADULT NURSE NOTE - NSFALLUNIVINTERV_ED_ALL_ED
Bed/Stretcher in lowest position, wheels locked, appropriate side rails in place/Call bell, personal items and telephone in reach/Instruct patient to call for assistance before getting out of bed/chair/stretcher/Non-slip footwear applied when patient is off stretcher/[place] to call system/Physically safe environment - no spills, clutter or unnecessary equipment/Purposeful proactive rounding/Room/bathroom lighting operational, light cord in reach Bed/Stretcher in lowest position, wheels locked, appropriate side rails in place/Call bell, personal items and telephone in reach/Instruct patient to call for assistance before getting out of bed/chair/stretcher/Non-slip footwear applied when patient is off stretcher/[place] to call system/Physically safe environment - no spills, clutter or unnecessary equipment/Purposeful proactive rounding/Room/bathroom lighting operational, light cord in reach

## 2023-12-27 NOTE — ED PROVIDER NOTE - WR ORDER DATE AND TIME 1
"David Block is a pleasant 70 y.o. male on day 5 of admission presenting with Symptomatic stenosis of left carotid artery.    Subjective   No acute events in the last 24 hours. Drain output 25 mL overnight and now with around 10 mL from this morning.        Objective   Last Recorded Vitals  Blood pressure 143/78, pulse 63, temperature 36.5 °C (97.7 °F), resp. rate 19, height 1.702 m (5' 7\"), weight 86.6 kg (190 lb 14.7 oz), SpO2 93 %.    Intake/Output last 3 Shifts:  I/O last 3 completed shifts:  In: 740 (8.5 mL/kg) [P.O.:740]  Out: 1345 (15.5 mL/kg) [Urine:1300 (0.4 mL/kg/hr); Drains:45]  Weight: 86.6 kg     Physical Exam  Vitals and nursing note reviewed.   Constitutional:       General: He is awake. He is not in acute distress.     Appearance: Normal appearance. He is well-developed and normal weight.   HENT:      Head: Normocephalic.      Right Ear: Hearing normal.      Mouth/Throat:      Mouth: Mucous membranes are moist.   Eyes:      General: Vision grossly intact. No scleral icterus.  Neck:      Vascular: No JVD.      Trachea: No tracheal deviation.      Comments: Left neck ULISES drain with serosanguineous output  Cardiovascular:      Rate and Rhythm: Normal rate and regular rhythm.      Pulses:           Radial pulses are 2+ on the right side and 2+ on the left side.        Dorsalis pedis pulses are 2+ on the right side and 2+ on the left side.        Posterior tibial pulses are 2+ on the right side and 2+ on the left side.   Pulmonary:      Effort: Pulmonary effort is normal.   Chest:      Chest wall: No deformity.   Abdominal:      General: Abdomen is protuberant. There is no distension.   Musculoskeletal:      Cervical back: Full passive range of motion without pain and normal range of motion. Tenderness (appropriate incisional tenderness) present.      Right lower leg: No edema.      Left lower leg: No edema.   Skin:     General: Skin is warm.      Capillary Refill: Capillary refill takes less than 2 " seconds.      Comments: Incision is clean, dry, and intact with Dermabond in place.   Neurological:      General: No focal deficit present.      Mental Status: He is alert and oriented to person, place, and time.      Cranial Nerves: Cranial nerves 2-12 are intact.      Sensory: Sensation is intact.      Motor: Motor function is intact.   Psychiatric:         Mood and Affect: Mood and affect normal.         Relevant Results  Medications:  Scheduled Meds:acetaminophen, 650 mg, oral, q6h  aspirin, 81 mg, oral, Daily  atorvastatin, 80 mg, oral, Nightly  [Held by provider] bisoprolol, 2.5 mg, oral, Daily  [Held by provider] escitalopram, 5 mg, oral, Daily  heparin (porcine), 5,000 Units, subcutaneous, q8h  [Held by provider] hydroCHLOROthiazide, 25 mg, oral, q AM  [Held by provider] losartan, 25 mg, oral, Daily  pantoprazole, 40 mg, intravenous, Daily before breakfast      Continuous Infusions:   PRN Meds:.PRN medications: benzocaine-menthol, HYDROmorphone, labetaloL, oxyCODONE, oxyCODONE, oxygen    Labs:  Results from last 7 days   Lab Units 11/24/23  0943 11/23/23  0004 11/22/23  1603   SODIUM mmol/L 142 140 140   POTASSIUM mmol/L 4.2 4.3 4.1   CHLORIDE mmol/L 102 104 104   BUN mg/dL 20 14 11   CREATININE mg/dL 0.74 0.63 0.67     Results from last 7 days   Lab Units 11/24/23  0943 11/23/23  0004 11/22/23  1603   WBC AUTO x10*3/uL 18.3* 9.6 14.0*   HEMOGLOBIN g/dL 16.0 14.6 14.1   HEMATOCRIT % 47.8 44.0 42.8   PLATELETS AUTO x10*3/uL 207 164 152       Imaging:  Imaging from the last 24 hours reviewed independently by the vascular surgery team.        Assessment/Plan   David Block is a pleasant 70 y.o. male who underwent a left carotid endarterectomy on 11/20/2023 with a postoperative course notable for return to OR on 11/22/2023 for a neck hematoma evacuation. He is now recovering well. No recurrence of hematoma and drain output remains serosanguineous.    Plan:  Continue drain until output < 20 mL  Neuro exam  q4hr  Restart Losartan   Continue ASA  SQH for DVT ppx.    Home going anticoagulation/antiplatelet regimen:  Aspirin 81 mg daily    Follow-up Plan and Imaging Needed at Follow-up:  One month with Dr. Morgan with bilateral carotid artery duplex       Patient seen and discussed with the attending, Dr. Carlson, who is covering for Dr. Morgan.    Nikolas Canales MD  Vascular Surgery Fellow  Team Pager [phone]  Available on Secure Chat     27-Dec-2023 17:52

## 2024-01-26 ENCOUNTER — APPOINTMENT (OUTPATIENT)
Dept: NEUROLOGY | Facility: CLINIC | Age: 49
End: 2024-01-26

## 2024-03-18 NOTE — ED PROVIDER NOTE - EYES, MLM
Well appearing, awake, alert, oriented to person, place, time/situation and in no apparent distress. normal... Clear bilaterally, pupils equal, round and reactive to light.

## 2024-03-19 ENCOUNTER — EMERGENCY (EMERGENCY)
Facility: HOSPITAL | Age: 49
LOS: 1 days | Discharge: HOME | End: 2024-03-19
Attending: EMERGENCY MEDICINE | Admitting: EMERGENCY MEDICINE
Payer: COMMERCIAL

## 2024-03-19 VITALS
TEMPERATURE: 99 F | RESPIRATION RATE: 18 BRPM | SYSTOLIC BLOOD PRESSURE: 180 MMHG | DIASTOLIC BLOOD PRESSURE: 97 MMHG | OXYGEN SATURATION: 100 % | HEART RATE: 77 BPM

## 2024-03-19 LAB
ALBUMIN SERPL ELPH-MCNC: 4 G/DL — SIGNIFICANT CHANGE UP (ref 3.3–5)
ALP SERPL-CCNC: 99 U/L — SIGNIFICANT CHANGE UP (ref 40–120)
ALT FLD-CCNC: 25 U/L — SIGNIFICANT CHANGE UP (ref 4–41)
ANION GAP SERPL CALC-SCNC: 12 MMOL/L — SIGNIFICANT CHANGE UP (ref 7–14)
AST SERPL-CCNC: 43 U/L — HIGH (ref 4–40)
BASOPHILS # BLD AUTO: 0.03 K/UL — SIGNIFICANT CHANGE UP (ref 0–0.2)
BASOPHILS NFR BLD AUTO: 0.4 % — SIGNIFICANT CHANGE UP (ref 0–2)
BILIRUB SERPL-MCNC: <0.2 MG/DL — SIGNIFICANT CHANGE UP (ref 0.2–1.2)
BUN SERPL-MCNC: 13 MG/DL — SIGNIFICANT CHANGE UP (ref 7–23)
CALCIUM SERPL-MCNC: 8.8 MG/DL — SIGNIFICANT CHANGE UP (ref 8.4–10.5)
CHLORIDE SERPL-SCNC: 104 MMOL/L — SIGNIFICANT CHANGE UP (ref 98–107)
CO2 SERPL-SCNC: 22 MMOL/L — SIGNIFICANT CHANGE UP (ref 22–31)
CREAT SERPL-MCNC: 1.11 MG/DL — SIGNIFICANT CHANGE UP (ref 0.5–1.3)
EGFR: 81 ML/MIN/1.73M2 — SIGNIFICANT CHANGE UP
EOSINOPHIL # BLD AUTO: 0.18 K/UL — SIGNIFICANT CHANGE UP (ref 0–0.5)
EOSINOPHIL NFR BLD AUTO: 2.3 % — SIGNIFICANT CHANGE UP (ref 0–6)
GLUCOSE SERPL-MCNC: 85 MG/DL — SIGNIFICANT CHANGE UP (ref 70–99)
HCT VFR BLD CALC: 38.7 % — LOW (ref 39–50)
HGB BLD-MCNC: 12.1 G/DL — LOW (ref 13–17)
IANC: 4.23 K/UL — SIGNIFICANT CHANGE UP (ref 1.8–7.4)
IMM GRANULOCYTES NFR BLD AUTO: 0.3 % — SIGNIFICANT CHANGE UP (ref 0–0.9)
LYMPHOCYTES # BLD AUTO: 2.69 K/UL — SIGNIFICANT CHANGE UP (ref 1–3.3)
LYMPHOCYTES # BLD AUTO: 34.1 % — SIGNIFICANT CHANGE UP (ref 13–44)
MCHC RBC-ENTMCNC: 22.9 PG — LOW (ref 27–34)
MCHC RBC-ENTMCNC: 31.3 GM/DL — LOW (ref 32–36)
MCV RBC AUTO: 73.3 FL — LOW (ref 80–100)
MONOCYTES # BLD AUTO: 0.73 K/UL — SIGNIFICANT CHANGE UP (ref 0–0.9)
MONOCYTES NFR BLD AUTO: 9.3 % — SIGNIFICANT CHANGE UP (ref 2–14)
NEUTROPHILS # BLD AUTO: 4.23 K/UL — SIGNIFICANT CHANGE UP (ref 1.8–7.4)
NEUTROPHILS NFR BLD AUTO: 53.6 % — SIGNIFICANT CHANGE UP (ref 43–77)
NRBC # BLD: 0 /100 WBCS — SIGNIFICANT CHANGE UP (ref 0–0)
NRBC # FLD: 0 K/UL — SIGNIFICANT CHANGE UP (ref 0–0)
PLATELET # BLD AUTO: 269 K/UL — SIGNIFICANT CHANGE UP (ref 150–400)
POTASSIUM SERPL-MCNC: 5.2 MMOL/L — SIGNIFICANT CHANGE UP (ref 3.5–5.3)
POTASSIUM SERPL-SCNC: 5.2 MMOL/L — SIGNIFICANT CHANGE UP (ref 3.5–5.3)
PROT SERPL-MCNC: 7.7 G/DL — SIGNIFICANT CHANGE UP (ref 6–8.3)
RBC # BLD: 5.28 M/UL — SIGNIFICANT CHANGE UP (ref 4.2–5.8)
RBC # FLD: 15.6 % — HIGH (ref 10.3–14.5)
SODIUM SERPL-SCNC: 138 MMOL/L — SIGNIFICANT CHANGE UP (ref 135–145)
TROPONIN T, HIGH SENSITIVITY RESULT: 7 NG/L — SIGNIFICANT CHANGE UP
WBC # BLD: 7.88 K/UL — SIGNIFICANT CHANGE UP (ref 3.8–10.5)
WBC # FLD AUTO: 7.88 K/UL — SIGNIFICANT CHANGE UP (ref 3.8–10.5)

## 2024-03-19 PROCEDURE — 93010 ELECTROCARDIOGRAM REPORT: CPT

## 2024-03-19 PROCEDURE — 71046 X-RAY EXAM CHEST 2 VIEWS: CPT | Mod: 26

## 2024-03-19 PROCEDURE — 99285 EMERGENCY DEPT VISIT HI MDM: CPT

## 2024-03-19 RX ORDER — FAMOTIDINE 10 MG/ML
20 INJECTION INTRAVENOUS ONCE
Refills: 0 | Status: COMPLETED | OUTPATIENT
Start: 2024-03-19 | End: 2024-03-19

## 2024-03-19 RX ADMIN — FAMOTIDINE 20 MILLIGRAM(S): 10 INJECTION INTRAVENOUS at 18:21

## 2024-03-19 RX ADMIN — Medication 30 MILLILITER(S): at 18:20
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
## 2024-03-19 NOTE — ED PROVIDER NOTE - PATIENT PORTAL LINK FT
You can access the FollowMyHealth Patient Portal offered by [facility] by registering at the following website: [email or web address]. By joining [employer]’s FollowMyHealth portal, you will also be able to view your health information using other applications (apps) compatible with our system.

## 2024-03-19 NOTE — ED PROVIDER NOTE - NSFOLLOWUPINSTRUCTIONS_ED_ALL_ED_FT
As we discussed you likely have reflux disease.  TO help counter this try to eat dinner early, refrain from smoking, alcohol.    You can also take any of these medications prior to sleeping  omeprazole  nexium  pepcid    Return to ER for worsening pain, any other symptoms where you feel you need immediate care    I have attached your lab and chest xray results

## 2024-03-19 NOTE — ED PROVIDER NOTE - OBJECTIVE STATEMENT
48-year-old male with history of hypertension presents with midepigastric pain rating to her throat.  States has been present for the last 4 days.  More prevalent while sleeping and waking up.  No exertional symptoms.  States food makes it worse.  Does have a history of reflux.  Has not taken any medications for this pain.  Wanted to get checked out, worried about chest pain.

## 2024-03-19 NOTE — ED PROVIDER NOTE - CLINICAL SUMMARY MEDICAL DECISION MAKING FREE TEXT BOX
Impression  EKG normal sinus rhythm no ST deviations  Clinical history and exam consistent with reflux disease, EKG within normal limits  Will get chest x-ray and troponin and basic labs  Treat for GERD and reassess

## 2024-03-19 NOTE — ED ADULT NURSE NOTE - NSFALLUNIVINTERV_ED_ALL_ED
Bed/Stretcher in lowest position, wheels locked, appropriate side rails in place/Call bell, personal items and telephone in reach/Instruct patient to call for assistance before getting out of bed/chair/stretcher/Non-slip footwear applied when patient is off stretcher/[place] to call system/Physically safe environment - no spills, clutter or unnecessary equipment/Purposeful proactive rounding/Room/bathroom lighting operational, light cord in reach

## 2024-03-19 NOTE — ED ADULT NURSE NOTE - OBJECTIVE STATEMENT
A&Ox4. ambulatory. c/o midline chest pain after eating, drinking and laying down. NAD. pt denies SOB, chest pain, dizziness, weakness, urinary symptoms, HA, n/v/d, fevers, chills. respirations are even and un labored. skin intact. 20g placed to left hand. labs drawn and sent. safety precautions maintained.

## 2024-04-02 NOTE — ED ADULT NURSE NOTE - NS ED NURSE IV DC DT
Hi          Pt would Like A Medication.Pt Is Out Of Medication.      metoPROLOL succinate (TOPROL-XL) 100 MG 24 hr tablet   --  01/22/24  --  Ahsan Collado MD     Take 1 tablet by mouth daily.               Pt#[phone]           Thanks   17-Aug-2022 08:05

## 2024-04-05 NOTE — ED ADULT NURSE NOTE - CAS ELECT INFOMATION PROVIDED
Problem: Cardiac Rhythm Disturbances with or without Devices  Goal: Hemodynamic stability achieved/maintained  Outcome: Monitoring/Evaluating progress  Goal: Anxiety is controlled  Outcome: Monitoring/Evaluating progress  Goal: Participates in ADL/Activity without s/s of intolerance  Outcome: Monitoring/Evaluating progress  Goal: Urinary elimination pattern returned to baseline  Description: Patient must be making adequate amounts of urine output (240cc/8 hours) and voiding. If indwelling urinary catheter: Remove asap (no later an Day 2 or provider must specify reason for continued use).  Outcome: Monitoring/Evaluating progress  Goal: Verbalizes understanding of rhythm disturbance, treatment procedure and pre, post-, and d/c care specific to intervention  Description: Document on Patient Education Activity  Outcome: Monitoring/Evaluating progress     Problem: Fluid Volume Excess  Goal: Fluid Volume Excess Symptoms Resolved  Description: Treatment often consists of oxygen and respiratory support with diuretic therapy at doses that exceed usual dose (typically doubled).  Monitor patient response to treatment.    Acute dyspnea should resolve quickly if dose is adequate and kidney function is adequate. Dyspnea/SOB should only be observed with Activity after effective treatment. Patient should be able to lie down comfortably, without SOB.  Outcome: Monitoring/Evaluating progress  Goal: Oxygenation is maintained (SpO2 greater than or equal to 90% or as ordered)  Outcome: Monitoring/Evaluating progress  Goal: Verbalizes understanding of FVE management plan  Description: Document on Patient Education Activity  Outcome: Monitoring/Evaluating progress     Problem: At Risk for Falls  Goal: Patient does not fall  Outcome: Monitoring/Evaluating progress  Goal: Patient takes action to control fall-related risks  Outcome: Monitoring/Evaluating progress     Problem: At Risk for Injury Due to Fall  Goal: Patient does not  fall  Outcome: Monitoring/Evaluating progress  Goal: Takes action to control condition specific risks  Outcome: Monitoring/Evaluating progress  Goal: Verbalizes understanding of fall-related injury personal risks  Description: Document education using the patient education activity  Outcome: Monitoring/Evaluating progress     Problem: Breathing Pattern Ineffective  Goal: Air exchange is effective, demonstrated by Sp02 sat of greater then or = 92% (or as ordered)  Outcome: Monitoring/Evaluating progress  Goal: Respiratory pattern is quiet and regular without report of SOB  Outcome: Monitoring/Evaluating progress  Goal: Breathing pattern demonstrates minimal apnea during sleep with appropriate use of airway pressure support devices  Outcome: Monitoring/Evaluating progress  Goal: Verbalizes/demonstrates effective breathing management strategies  Description: Document education using the patient education activity.   Outcome: Monitoring/Evaluating progress     Problem: Artificial Airway Management  Goal: # Maintains effective artificial airway  Outcome: Monitoring/Evaluating progress  Goal: # Maintains skin integrity around the airway  Outcome: Monitoring/Evaluating progress  Goal: # Tolerates Activity/ADL without s/s of intolerance  Description: Monitor patient tolerance of the artificial airway while they perform activities and ADLs include bathing, showering, shaving, and eating.  Outcome: Monitoring/Evaluating progress  Goal: Verbalizes understanding of artifical airway function and care  Description: Document on Patient Education Activity  Outcome: Monitoring/Evaluating progress  Goal: Demonstrates ability to manage Trach: site care, suctioning, trach agarwal care & inner cannula care if needed.  Description: Document on Patient Education Activity    Outcome: Monitoring/Evaluating progress  Goal: Demonstrates ability to perform Trach cuff maintenance  Description: Document on Patient Education Activity    Outcome:  Monitoring/Evaluating progress  Goal: Demonstrates ability to manage Laryngectomy: stoma care, suctioning, and humidification  Description: Document on Patient Education Activity  Outcome: Monitoring/Evaluating progress  Goal: Demonstrates ability to manage communication (speaking valve or cork insertion)  Description: Document on Patient Education Activity  Outcome: Monitoring/Evaluating progress      DC instructions

## 2024-06-02 NOTE — CONSULT NOTE ADULT - CONSULT REQUESTED BY NAME
Chief Complaint   Patient presents with    Urgent Care    Eye Problem     Pt in clinic to have eval for bilateral eye irritation, discharge, redness and swelling for 2-3 days.     SUBJECTIVE:  Felipe Huddleston is a 29 year old male presenting with bilateral pinkeye discharge redness dryness gritty sensation swelling over the past 3 days.  He has had this happen before with quite severe environmental allergies.  His eyes have become so swollen that they seem to be shut in the mornings.  Scant yellow crusty discharge.  The redness is quite profound.  He declines severe eyeball pain vision loss.  He is a contact lens user.  Has not worn his contacts in several days.  No changes in lens solution.  Used OTC Pataday per pharmacist.  Symptoms started even prior to that.    Past Medical History:   Diagnosis Date    Depressive disorder High school    No longer a problem    Heart disease birth    Heart murmur    Heart murmur birth    Heart murmur     Current Outpatient Medications   Medication Sig Dispense Refill    cetirizine (ZYRTEC) 10 MG tablet Take 10 mg by mouth daily      methylphenidate (RITALIN) 10 MG tablet Take 1 tablet (10 mg) by mouth daily 30 tablet 0    ofloxacin (OCUFLOX) 0.3 % ophthalmic solution Place 1-2 drops into both eyes 4 times daily for 7 days 5 mL 0    olopatadine (PATANOL) 0.1 % ophthalmic solution 1 drop 2 times daily      predniSONE (DELTASONE) 20 MG tablet 2 tabs for 2 days, 1 tab for 2 days, 0.5 tab for 2 days 7 tablet 0    triamcinolone (NASACORT) 55 MCG/ACT nasal aerosol Spray 2 sprays into both nostrils daily       No current facility-administered medications for this visit.     Social History     Tobacco Use    Smoking status: Never     Passive exposure: Yes    Smokeless tobacco: Former    Tobacco comments:     occasional smoker when he drinks   Substance Use Topics    Alcohol use: Yes     No Known Allergies    Review of Systems  All systems negative except for those listed above in  "HPI.    OBJECTIVE:   /85   Pulse 77   Temp 98.4  F (36.9  C) (Temporal)   Ht 1.727 m (5' 8\")   Wt 73.9 kg (163 lb)   SpO2 96%   BMI 24.78 kg/m      Physical Exam  Vitals reviewed.   Constitutional:       Appearance: Normal appearance.   HENT:      Head: Normocephalic and atraumatic.      Nose: Nose normal.      Mouth/Throat:      Mouth: Mucous membranes are moist.      Pharynx: Oropharynx is clear.   Eyes:      General:         Right eye: Discharge present.         Left eye: Discharge present.     Extraocular Movements: Extraocular movements intact.      Pupils: Pupils are equal, round, and reactive to light.      Comments: Bilateral eyes with moderate scleral injection and erythema surrounding upper and lower orbital spaces.  Dryness scant yellow crusting and edema.   Cardiovascular:      Rate and Rhythm: Normal rate.      Pulses: Normal pulses.   Pulmonary:      Effort: Pulmonary effort is normal.   Musculoskeletal:         General: Normal range of motion.      Cervical back: Normal range of motion and neck supple.   Skin:     General: Skin is warm and dry.      Findings: Erythema and rash present.   Neurological:      General: No focal deficit present.      Mental Status: He is alert and oriented to person, place, and time.   Psychiatric:         Mood and Affect: Mood normal.         Behavior: Behavior normal.       ASSESSMENT:    ICD-10-CM    1. Pink eye disease of both eyes  H10.023 ofloxacin (OCUFLOX) 0.3 % ophthalmic solution      2. Angioedema, initial encounter  T78.3XXA predniSONE (DELTASONE) 20 MG tablet          PLAN:     Ofloxacin and prednisone for extensive bacterial conjunctivitis and a contact lens wear with allergic angioedema  Recommend adding in Benadryl at night  No threshold for patient to follow up with eye doctor for slit-lamp exam if no improvement in 1 to 2 days  Immediately if severe eyeball pain vision loss fevers  I also discussed that Pataday antihistamine drops can " ED sometimes dry out the eye and increased burning and redness    Use antibiotic eye drops as directed for 7 days.  Wipe away drainage before administering eye drops.  Wipe discharge away with wet paper towel and then discard.   Discharge should clear up in 72 hours; redness may continue several more days.  Out of activities for at least 24 hrs due to being contagious.  Infection is spread by contact. Avoid touching eye as much as possible to avoid spreading the infection.  Wash hands regularly and sanitize items touched.  Wash previously used bath, face and hand towels. Do not share towels with others.      Follow up with primary care provider with any problems, questions or concerns or if symptoms worsen or fail to improve. Patient agreed to plan and verbalized understanding.    HANNA [facility]

## 2024-08-15 ENCOUNTER — RX RENEWAL (OUTPATIENT)
Age: 49
End: 2024-08-15

## 2024-08-16 NOTE — ED PROVIDER NOTE - DISPOSITION TYPE
Echo showing EF 40-45% with diastolic dysfunction  Avoiding ACEi/ARB and diuretics due to renal failure  Management per cardiology/nephrology    DISCHARGE

## 2024-10-04 NOTE — ED ADULT TRIAGE NOTE - BMI (KG/M2)
Spoke w wife and scheduled virtual appt for 10/17 at 7am    ----- Message from Yessica Christopher MD sent at 10/4/2024  2:03 PM CDT -----  DBS consult for Dr. Wade  
27.5

## 2024-10-16 NOTE — ED ADULT NURSE NOTE - SUICIDE SCREENING DEPRESSION
Negative Bed in lowest position, wheels locked, appropriate side rails in place/Call bell, personal items and telephone in reach/Instruct patient to call for assistance before getting out of bed or chair/Non-slip footwear when patient is out of bed/[place] to call system/Physically safe environment - no spills, clutter or unnecessary equipment/Purposeful Proactive Rounding/Room/bathroom lighting operational, light cord in reach

## 2024-12-04 ENCOUNTER — RX RENEWAL (OUTPATIENT)
Age: 49
End: 2024-12-04

## 2025-01-07 NOTE — ED ADULT TRIAGE NOTE - PATIENT ON (OXYGEN DELIVERY METHOD)
[Chest Pain] : no chest pain [Anxiety] : no anxiety [Muscle Weakness] : no muscle weakness [Swollen Glands] : no swollen glands [Negative] : Neurological [FreeTextEntry9] : Has occasional leg stiffness and R shoulder pain room air